# Patient Record
Sex: MALE | Race: WHITE | Employment: FULL TIME | URBAN - NONMETROPOLITAN AREA
[De-identification: names, ages, dates, MRNs, and addresses within clinical notes are randomized per-mention and may not be internally consistent; named-entity substitution may affect disease eponyms.]

---

## 2019-08-29 ENCOUNTER — OFFICE VISIT (OUTPATIENT)
Dept: CARDIOLOGY CLINIC | Age: 54
End: 2019-08-29
Payer: COMMERCIAL

## 2019-08-29 VITALS
HEIGHT: 70 IN | SYSTOLIC BLOOD PRESSURE: 134 MMHG | BODY MASS INDEX: 30.12 KG/M2 | HEART RATE: 57 BPM | DIASTOLIC BLOOD PRESSURE: 93 MMHG | WEIGHT: 210.4 LBS

## 2019-08-29 DIAGNOSIS — Z95.820 S/P ANGIOPLASTY WITH STENT: ICD-10-CM

## 2019-08-29 DIAGNOSIS — I25.10 CORONARY ARTERY DISEASE INVOLVING NATIVE CORONARY ARTERY OF NATIVE HEART WITHOUT ANGINA PECTORIS: ICD-10-CM

## 2019-08-29 DIAGNOSIS — I10 ESSENTIAL HYPERTENSION: Primary | ICD-10-CM

## 2019-08-29 DIAGNOSIS — E78.00 PURE HYPERCHOLESTEROLEMIA: ICD-10-CM

## 2019-08-29 PROBLEM — K21.9 GERD (GASTROESOPHAGEAL REFLUX DISEASE): Status: ACTIVE | Noted: 2019-08-29

## 2019-08-29 PROCEDURE — 93000 ELECTROCARDIOGRAM COMPLETE: CPT | Performed by: INTERNAL MEDICINE

## 2019-08-29 PROCEDURE — 99204 OFFICE O/P NEW MOD 45 MIN: CPT | Performed by: INTERNAL MEDICINE

## 2019-08-29 RX ORDER — LISINOPRIL 20 MG/1
20 TABLET ORAL DAILY
Qty: 30 TABLET | Refills: 5 | Status: SHIPPED | OUTPATIENT
Start: 2019-08-29 | End: 2020-03-10 | Stop reason: SDUPTHER

## 2019-08-29 RX ORDER — ATORVASTATIN CALCIUM 20 MG/1
40 TABLET, FILM COATED ORAL
Qty: 30 TABLET | Refills: 5 | Status: SHIPPED | OUTPATIENT
Start: 2019-08-29 | End: 2019-08-30 | Stop reason: DRUGHIGH

## 2019-08-29 RX ORDER — ATORVASTATIN CALCIUM 20 MG/1
40 TABLET, FILM COATED ORAL
COMMUNITY
Start: 2016-04-18 | End: 2019-08-29 | Stop reason: SDUPTHER

## 2019-08-29 RX ORDER — NEBIVOLOL 2.5 MG/1
TABLET ORAL DAILY
COMMUNITY
End: 2019-08-29 | Stop reason: SDUPTHER

## 2019-08-29 RX ORDER — NEBIVOLOL 2.5 MG/1
2.5 TABLET ORAL DAILY
Qty: 30 TABLET | Refills: 5 | Status: SHIPPED | OUTPATIENT
Start: 2019-08-29 | End: 2020-03-10 | Stop reason: SDUPTHER

## 2019-08-29 NOTE — PROGRESS NOTES
current treatment and with constant vigilance to changes in symptoms and also any potential side effects. Return for care or seek medical attention immediately if symptoms got worse and/or develop new symptoms. Hypertension, on medical treatment. Seems to be under good control. Patient is compliant with medical treatment. Hyperlipidemia: on statins, followed periodically. Patient need periodic lipid and liver profile. Coronary artery disease, seems to be stable. Denies angina or change in breathing pattern    Lipid panel and liver function test before next appointment  Echo  CBC/BMP.  MG    Get report from  Edson    Stable and doing well    D/w the pat the plan of care    RTC in 4 months    Celine Albion [facility]

## 2019-08-30 RX ORDER — ATORVASTATIN CALCIUM 20 MG/1
20 TABLET, FILM COATED ORAL DAILY
COMMUNITY
End: 2019-08-30 | Stop reason: SDUPTHER

## 2019-08-30 RX ORDER — ATORVASTATIN CALCIUM 20 MG/1
20 TABLET, FILM COATED ORAL DAILY
Qty: 30 TABLET | Refills: 6 | Status: SHIPPED | OUTPATIENT
Start: 2019-08-30 | End: 2020-03-10 | Stop reason: SDUPTHER

## 2019-09-05 ENCOUNTER — HOSPITAL ENCOUNTER (OUTPATIENT)
Dept: NON INVASIVE DIAGNOSTICS | Age: 54
Discharge: HOME | End: 2019-09-05
Payer: COMMERCIAL

## 2019-09-05 DIAGNOSIS — E78.00 PURE HYPERCHOLESTEROLEMIA: ICD-10-CM

## 2019-09-05 DIAGNOSIS — I25.10 CORONARY ARTERY DISEASE INVOLVING NATIVE CORONARY ARTERY OF NATIVE HEART WITHOUT ANGINA PECTORIS: ICD-10-CM

## 2019-09-05 DIAGNOSIS — I10 ESSENTIAL HYPERTENSION: ICD-10-CM

## 2019-09-05 DIAGNOSIS — Z95.820 S/P ANGIOPLASTY WITH STENT: ICD-10-CM

## 2019-09-05 LAB
LV EF: 60 %
LVEF MODALITY: NORMAL

## 2019-09-05 PROCEDURE — 93306 TTE W/DOPPLER COMPLETE: CPT

## 2019-09-25 ENCOUNTER — TELEPHONE (OUTPATIENT)
Dept: FAMILY MEDICINE CLINIC | Age: 54
End: 2019-09-25

## 2019-10-02 ENCOUNTER — TELEPHONE (OUTPATIENT)
Dept: FAMILY MEDICINE CLINIC | Age: 54
End: 2019-10-02

## 2020-01-15 ENCOUNTER — HOSPITAL ENCOUNTER (OUTPATIENT)
Age: 55
Discharge: HOME | End: 2020-01-15
Payer: COMMERCIAL

## 2020-01-15 LAB
ALBUMIN SERPL-MCNC: 4.5 G/DL (ref 3.5–5.1)
ALP BLD-CCNC: 63 U/L (ref 38–126)
ALT SERPL-CCNC: 27 U/L (ref 11–66)
ANION GAP SERPL CALCULATED.3IONS-SCNC: 13 MEQ/L (ref 8–16)
AST SERPL-CCNC: 26 U/L (ref 5–40)
BASOPHILS # BLD: 0.7 %
BASOPHILS ABSOLUTE: 0 THOU/MM3 (ref 0–0.1)
BILIRUB SERPL-MCNC: 0.7 MG/DL (ref 0.3–1.2)
BILIRUBIN DIRECT: < 0.2 MG/DL (ref 0–0.3)
BUN BLDV-MCNC: 21 MG/DL (ref 7–22)
CALCIUM SERPL-MCNC: 9.3 MG/DL (ref 8.5–10.5)
CHLORIDE BLD-SCNC: 99 MEQ/L (ref 98–111)
CHOLESTEROL, TOTAL: 148 MG/DL (ref 100–199)
CO2: 24 MEQ/L (ref 23–33)
CREAT SERPL-MCNC: 0.9 MG/DL (ref 0.4–1.2)
EOSINOPHIL # BLD: 4.5 %
EOSINOPHILS ABSOLUTE: 0.3 THOU/MM3 (ref 0–0.4)
ERYTHROCYTE [DISTWIDTH] IN BLOOD BY AUTOMATED COUNT: 12.5 % (ref 11.5–14.5)
ERYTHROCYTE [DISTWIDTH] IN BLOOD BY AUTOMATED COUNT: 43.2 FL (ref 35–45)
GFR SERPL CREATININE-BSD FRML MDRD: 88 ML/MIN/1.73M2
GLUCOSE BLD-MCNC: 102 MG/DL (ref 70–108)
HCT VFR BLD CALC: 47.8 % (ref 42–52)
HDLC SERPL-MCNC: 57 MG/DL
HEMOGLOBIN: 16 GM/DL (ref 14–18)
IMMATURE GRANS (ABS): 0.03 THOU/MM3 (ref 0–0.07)
IMMATURE GRANULOCYTES: 0.4 %
LDL CHOLESTEROL CALCULATED: 79 MG/DL
LYMPHOCYTES # BLD: 37.3 %
LYMPHOCYTES ABSOLUTE: 2.5 THOU/MM3 (ref 1–4.8)
MAGNESIUM: 2.2 MG/DL (ref 1.6–2.4)
MCH RBC QN AUTO: 31.6 PG (ref 26–33)
MCHC RBC AUTO-ENTMCNC: 33.5 GM/DL (ref 32.2–35.5)
MCV RBC AUTO: 94.3 FL (ref 80–94)
MONOCYTES # BLD: 11.7 %
MONOCYTES ABSOLUTE: 0.8 THOU/MM3 (ref 0.4–1.3)
NUCLEATED RED BLOOD CELLS: 0 /100 WBC
PLATELET # BLD: 219 THOU/MM3 (ref 130–400)
PMV BLD AUTO: 9.3 FL (ref 9.4–12.4)
POTASSIUM SERPL-SCNC: 4.6 MEQ/L (ref 3.5–5.2)
RBC # BLD: 5.07 MILL/MM3 (ref 4.7–6.1)
SEG NEUTROPHILS: 45.4 %
SEGMENTED NEUTROPHILS ABSOLUTE COUNT: 3 THOU/MM3 (ref 1.8–7.7)
SODIUM BLD-SCNC: 136 MEQ/L (ref 135–145)
TOTAL PROTEIN: 7.2 G/DL (ref 6.1–8)
TRIGL SERPL-MCNC: 60 MG/DL (ref 0–199)
WBC # BLD: 6.7 THOU/MM3 (ref 4.8–10.8)

## 2020-01-15 PROCEDURE — 85025 COMPLETE CBC W/AUTO DIFF WBC: CPT

## 2020-01-15 PROCEDURE — 80061 LIPID PANEL: CPT

## 2020-01-15 PROCEDURE — 82248 BILIRUBIN DIRECT: CPT

## 2020-01-15 PROCEDURE — 36415 COLL VENOUS BLD VENIPUNCTURE: CPT

## 2020-01-15 PROCEDURE — 83735 ASSAY OF MAGNESIUM: CPT

## 2020-01-15 PROCEDURE — 80053 COMPREHEN METABOLIC PANEL: CPT

## 2020-01-17 ENCOUNTER — OFFICE VISIT (OUTPATIENT)
Dept: CARDIOLOGY CLINIC | Age: 55
End: 2020-01-17
Payer: COMMERCIAL

## 2020-01-17 VITALS
HEART RATE: 68 BPM | HEIGHT: 70 IN | WEIGHT: 216 LBS | BODY MASS INDEX: 30.92 KG/M2 | SYSTOLIC BLOOD PRESSURE: 108 MMHG | DIASTOLIC BLOOD PRESSURE: 69 MMHG

## 2020-01-17 PROBLEM — Z95.820 S/P ANGIOPLASTY WITH STENT: Status: RESOLVED | Noted: 2019-08-29 | Resolved: 2020-01-17

## 2020-01-17 PROCEDURE — 99214 OFFICE O/P EST MOD 30 MIN: CPT | Performed by: INTERNAL MEDICINE

## 2020-01-17 RX ORDER — ASPIRIN 81 MG/1
81 TABLET ORAL DAILY
Qty: 90 TABLET | Refills: 1 | COMMUNITY
Start: 2020-01-17

## 2020-01-17 NOTE — PROGRESS NOTES
Chief Complaint   Patient presents with    Hypertension    Follow-up   originally  patient moved from [facility]  ? ? ? Hx of Mitral valve repair/ clip for MR 5 yrs back- transcathater- per pat ( not confirmed)  ? ? ? Hx of coronary stent 2 5 yrs back per pat ( Not confirmed from record)      Today here 5 months F/u    Denied cp, sob, palpitations, dizziness or edema    Nonsmoker    FHX  Mother had valve procedure, had stent at 72    Patient Active Problem List   Diagnosis    Coronary artery disease involving native coronary artery of native heart without angina pectoris    Pure hypercholesterolemia    Essential hypertension    Abnormal stress test    Chronic systolic heart failure ([address]    Diastolic murmur    Family history of early CAD    GERD (gastroesophageal reflux disease)    Inguinal hernia    Insomnia    LVH (left ventricular hypertrophy)    Smoking       Past Surgical History:   Procedure Laterality Date    APPENDECTOMY      CARDIAC CATHETERIZATION      CORONARY ANGIOPLASTY      INGUINAL HERNIA REPAIR      INGUINAL HERNIA REPAIR Left 05/13/2016    MITRAL VALVE REPAIR      ROTATOR CUFF REPAIR Right     VENTRAL HERNIA REPAIR  05/13/2016       No Known Allergies     History reviewed. No pertinent family history.      Social History     Socioeconomic History    Marital status:      Spouse name: Not on file    Number of children: Not on file    Years of education: Not on file    Highest education level: Not on file   Occupational History    Not on file   Social Needs    Financial resource strain: Not on file    Food insecurity:     Worry: Not on file     Inability: Not on file    Transportation needs:     Medical: Not on file     Non-medical: Not on file   Tobacco Use    Smoking status: Former Smoker    Smokeless tobacco: Current User     Types: Chew   Substance and Sexual Activity    Alcohol use: Not on file    Drug use: Not on file    Sexual activity: Not on file   Lifestyle bruits  Chest - clear to auscultation, no wheezes, rales or rhonchi, symmetric air entry  Heart - normal rate, regular rhythm, normal S1, S2, no murmurs, rubs, clicks or gallops  Abdomen - soft, nontender, nondistended, no masses or organomegaly  DAVI- no CVA or flank tenderness, no suprapubic tenderness  Neurological - alert, oriented, normal speech, no focal findings or movement disorder noted  Musculoskeletal/limbs - no joint tenderness, deformity or swelling   - peripheral pulses normal, no pedal edema, no clubbing or cyanosis  Skin - normal coloration and turgor, no rashes, no suspicious skin lesions noted  Psych- appropriate mood and affect    Lab  No results for input(s): CKTOTAL, CKMB, CKMBINDEX, TROPONINI in the last 72 hours.   CBC:   Lab Results   Component Value Date    WBC 6.7 01/15/2020    RBC 5.07 01/15/2020    HGB 16.0 01/15/2020    HCT 47.8 01/15/2020    MCV 94.3 01/15/2020    MCH 31.6 01/15/2020    MCHC 33.5 01/15/2020     01/15/2020    MPV 9.3 01/15/2020     BMP:    Lab Results   Component Value Date     01/15/2020    K 4.6 01/15/2020    CL 99 01/15/2020    CO2 24 01/15/2020    BUN 21 01/15/2020    LABALBU 4.5 01/15/2020    CREATININE 0.9 01/15/2020    CALCIUM 9.3 01/15/2020    LABGLOM 88 01/15/2020    GLUCOSE 102 01/15/2020     Hepatic Function Panel:    Lab Results   Component Value Date    ALKPHOS 63 01/15/2020    ALT 27 01/15/2020    AST 26 01/15/2020    PROT 7.2 01/15/2020    BILITOT 0.7 01/15/2020    BILIDIR <0.2 01/15/2020    LABALBU 4.5 01/15/2020     Magnesium:    Lab Results   Component Value Date    MG 2.2 01/15/2020     Warfarin PT/INR:  No components found for: PTPATWAR, PTINRWAR  HgBA1c:  No results found for: LABA1C  FLP:    Lab Results   Component Value Date    TRIG 60 01/15/2020    HDL 57 01/15/2020    LDLCALC 79 01/15/2020     TSH:  No results found for: TSH  Reviewed the record from outside Hospital  Cardiac cath Dec 30, 2015 at [facility], [address] [address] [address] < [place]  ]EF 65%  LAD : 30-50%  LCx/RCA: Normal  RCA could not be accessed radially- had to be accessed through femoral access    Electronically signed by Parth Victor MD at 12/30/2015 11:37 AM EST        Mid LAD lesion 50% stenosed.       Final Impression:        1.  Coronary artery disease as described above    2.  Mild to moderate stenosis of the mid left anterior descending artery.    3.  The left circumflex Artery and the right coronary artery appear     angiographically normal.  The right coronary artery has a posterior     takeoff.    4.  Left ventricular filling pressures are normal    5.  Left ventricular systolic function is normal           Plan:        1.  Adding calcium channel blockers for possible spasm.    2.  Aspirin and statin therapy is imperative.      Mike Villaseñor MD, [facility]    Echo Dec 2015  CONCLUSIONS     Left ventricular wall thickness mildly increased. Left ventricular cavity size normal.  Mild global hypokinesis. LVEF   45-50%.     Normal right ventricular size.   Mildly reduced right ventricular global systolic function.     Mild-to-moderate mitral regurgitation    ekg  8/29/19  Sinus  Bradycardia   WITHIN NORMAL LIMITS    Conclusions      Summary   Normal left ventricle size and systolic function. Ejection fraction was   estimated at 60 %. There were no regional left ventricular wall motion   abnormalities and wall thickness was within normal limits. The left atrium is Mildly dilated. Signature      ----------------------------------------------------------------   Electronically signed by Barbara Cabrera MD (Interpreting   physician) on 09/05/2019 at 05:39 PM      Assessment    Hx valve procedure mitral valve     Diagnosis Orders   1. Coronary artery disease involving native coronary artery of native heart without angina pectoris     2. Pure hypercholesterolemia     3.  Essential hypertension           Plan   The meds and labs reviewed    Continue the current treatment and with constant vigilance to changes in symptoms and also any potential side effects. Return for care or seek medical attention immediately if symptoms got worse and/or develop new symptoms. Hypertension, on medical treatment. Seems to be under good control. Patient is compliant with medical treatment. Hyperlipidemia: on statins, followed periodically. Patient need periodic lipid and liver profile. Coronary artery disease, seems to be stable.  Denies angina or change in breathing pattern  Asa 81 mg po qd  Metoprolol stopped In [place] due to ED per pat  Now on Bystolic    Echo- WNL   report from  [address] [place] [address] reviewed   NO stent noted   No Mitral valve surgery Noted from records    Stable and doing well    D/w the pat the plan of care    RTC in 6 months    Garrett Clark [facility]

## 2020-03-10 RX ORDER — ATORVASTATIN CALCIUM 20 MG/1
20 TABLET, FILM COATED ORAL DAILY
Qty: 90 TABLET | Refills: 1 | Status: SHIPPED | OUTPATIENT
Start: 2020-03-10 | End: 2020-07-17 | Stop reason: SDUPTHER

## 2020-03-10 RX ORDER — NEBIVOLOL 2.5 MG/1
2.5 TABLET ORAL DAILY
Qty: 90 TABLET | Refills: 1 | Status: SHIPPED | OUTPATIENT
Start: 2020-03-10 | End: 2020-07-17 | Stop reason: SDUPTHER

## 2020-03-10 RX ORDER — LISINOPRIL 20 MG/1
20 TABLET ORAL DAILY
Qty: 90 TABLET | Refills: 1 | Status: SHIPPED | OUTPATIENT
Start: 2020-03-10 | End: 2020-07-17 | Stop reason: SDUPTHER

## 2020-07-08 ENCOUNTER — TELEPHONE (OUTPATIENT)
Dept: FAMILY MEDICINE CLINIC | Age: 55
End: 2020-07-08

## 2020-07-08 NOTE — TELEPHONE ENCOUNTER
1. Appt time and date. (give directions)   Future Appointments   Date Time Provider Moiz Hernández   7/9/2020 10:00 AM [address]   7/17/2020  8:30 AM MD [facility]     2. Arrive 5 min before appt. 3. Please bring all medications to appt. 4. Bring immunization record.   (if no record, which immunizations have you had and where?)    appt confirmed

## 2020-07-08 NOTE — PROGRESS NOTES
Chief Complaint   Patient presents with    New Patient     np, est pcp    Coronary Artery Disease     HFpEF    Hypertension    Hyperlipidemia    Otalgia    Elbow Pain     R    Nicotine Dependence       History obtained from the patient. SUBJECTIVE:  Adam Parisi is a 48 y.o. male that presents today for establishing care with new physician, etc. New patient, [facility] [address]. -CAD/HFpEF:    HPI:  S/p stent placement and mitral valve repair  See's cardio    History of myocardial infarction? Yes    History of heart failure? Yes. Current symptoms of angina? No   Patient compliant with therapy? Yes  Tobacco use? Chew      Antiplatelet therapy? Yes    Beta-blocker therapy? Yes   ACE/ARB therapy - Yes      -HTN:    HPI:    Taking meds as prescribed ?: yes  Tolerating well ?: yes  Side Effects ?: denies  BP at home ?: <140/90  Working on [employer] ?: yes  Chest Pain/SOB/Palpitations? denies    BP Readings from Last 3 Encounters:   20 122/86   20 108/69   19 (!) 134/93       -HLD:    HPI:    Taking meds as prescribed ?: yes  Tolerating well ?: yes  Side Effects ?: denies  Muscle Pain?: denies  Working on [address] [employer] ?: yes      -B Ear Complaint:    HPI:  Going on a month or 6  wks  Treated with ATB last time around, did help  Mild runny nose  No fevers    Inciting incident or history of trauma? no  Decreased hearing? Yes - mild  Ear tenderness? No  Ear drainage? No  Feeling of fullness? Yes  Radiation of the pain? No  Dizziness or pre-syncope? No  Affected by position or head movment? No  Sore throat, rhinorrhea or sinus congestion?  mild  Hx of Bruxism?  No  Treatment tried and response - as above      -R elbow pain:  Pain lateral R elbow  Does competitive shooting  Worse with this  Going on a few months  Asking what can do      -Chewing tobacco:  Chewed for a long time  Would like to quit  Tried chantix in the past, worked well, but stopped when mom   Wants to resume this again, as tolerated well. Age/Gender Health Maintenance    Lipid -   Lab Results   Component Value Date    CHOL 148 01/15/2020     Lab Results   Component Value Date    TRIG 60 01/15/2020     Lab Results   Component Value Date    HDL 57 01/15/2020     Lab Results   Component Value Date    LDLCALC 79 01/15/2020     No results found for: LABVLDL, VLDL  No results found for: CHOLHDLRATIO      DM Screen -   Lab Results   Component Value Date    GLUCOSE 102 01/15/2020     No results found for: LABA1C    Colon Cancer Screening - records pending  Lung Cancer Screening (Age 54 to [de-identified] with 27 pack year hx, current smoker or quit within past 13 years) - age 54 if meets criteria    Tetanus - declines July 2020  Influenza Vaccine - Candidate FALL 2020  Pneumonia Vaccine - declines July 2020  Zoster - declines July 2020     PSA testing discussion - age 54  AAA Screening - age 72 if smoked    Falls screening - n/a      Current Outpatient Medications   Medication Sig Dispense Refill    amLODIPine (NORVASC) 10 MG tablet Take 1 tablet by mouth daily      varenicline (CHANTIX CONTINUING MONTH MANISHA) 1 MG tablet Take 1 tablet by mouth 2 times daily 60 tablet 1    varenicline (CHANTIX STARTING MONTH MANISHA) 0.5 MG X 11 & 1 MG X 42 tablet Take by mouth as directed on box 1 box 0    fluticasone (FLONASE) 50 MCG/ACT nasal spray 1 spray by Nasal route daily 2 Bottle 0    lisinopril (PRINIVIL;ZESTRIL) 20 MG tablet Take 1 tablet by mouth daily 90 tablet 1    nebivolol (BYSTOLIC) 2.5 MG tablet Take 1 tablet by mouth daily 90 tablet 1    atorvastatin (LIPITOR) 20 MG tablet Take 1 tablet by mouth daily 90 tablet 1    aspirin EC 81 MG EC tablet Take 1 tablet by mouth daily 90 tablet 1     No current facility-administered medications for this visit.       Orders Placed This Encounter   Medications    varenicline (CHANTIX CONTINUING MONTH MANISHA) 1 MG tablet     Sig: Take 1 tablet by mouth 2 times daily     Dispense:  60 tablet Refill:  1    varenicline (CHANTIX STARTING MONTH ) 0.5 MG X 11 & 1 MG X 42 tablet     Sig: Take by mouth as directed on box     Dispense:  1 box     Refill:  0    fluticasone (FLONASE) 50 MCG/ACT nasal spray     Si spray by Nasal route daily     Dispense:  2 Bottle     Refill:  0         All medications reviewed and reconciled, including OTC and herbal medications. Updated list given to patient. Patient Active Problem List    Diagnosis Date Noted    Dysfunction of both eustachian tubes 2020    Right lateral epicondylitis 2020    ASHD (arteriosclerotic heart disease)     Dyslipidemia     Former smoker     Heart failure with preserved ejection fraction (HCC)      Hx of prior LV dysfunction      Hypertension, essential     Mitral regurgitation     Nicotine dependence, chewing tobacco, uncomplicated     Obesity (BMI 30-39. 9)     S/P coronary artery stent placement     S/P mitral valve repair     Diastolic murmur     Family history of early CAD 2015    LVH (left ventricular hypertrophy) 2015       Past Medical History:   Diagnosis Date    ASHD (arteriosclerotic heart disease)    [place] Dyslipidemia     Former smoker     GERD (gastroesophageal reflux disease)     Heart failure with preserved ejection fraction (HCC)     Hx of prior LV dysfunction    Hypertension, essential     Mitral regurgitation     Nicotine dependence, chewing tobacco, uncomplicated     Obesity (BMI 30-39. 9)     S/P coronary artery stent placement     S/P mitral valve repair          Past Surgical History:   Procedure Laterality Date    APPENDECTOMY      CARDIAC CATHETERIZATION      CORONARY ANGIOPLASTY      INGUINAL HERNIA REPAIR      INGUINAL HERNIA REPAIR Left 2016    MITRAL VALVE REPAIR      ROTATOR CUFF REPAIR Right     right and left    VENTRAL HERNIA REPAIR  2016         No Known Allergies      Social History     Tobacco Use    Smoking status: Former Smoker Start date: 2015     Last attempt to quit: 2016     Years since quittin.0    Smokeless tobacco: Current User     Types: Chew   Substance Use Topics    Alcohol use: Not Currently         Family History   Problem Relation Age of Onset    No Known Problems Mother     No Known Problems Father     Colon Cancer Neg Hx     Prostate Cancer Neg Hx          I have reviewed the patient's past medical history, past surgical history, allergies, medications, social and family history and I have made updates where appropriate. Review of Systems  Positive responses are highlighted in bold    Constitutional:  Fever, Chills, Night Sweats, Fatigue, Unexpected changes in weight  Eyes:  Eye discharge, Eye pain, Eye redness, Visual disturbances   HENT:  Ear pain, Tinnitus, Nosebleeds, Trouble swallowing, Hearing loss, Sore throat  Cardiovascular:  Chest Pain, Palpitations, Orthopnea, Paroxysmal Nocturnal Dyspnea  Respiratory:  Cough, Wheezing, Shortness of breath, Chest tightness, Apnea  Gastrointestinal:  Nausea, Vomiting, Diarrhea, Constipation, Heartburn, Blood in stool  Genitourinary:  Difficulty or painful urination, Flank pain, Change in frequency, Urgency  Skin:  Color change, Rash, Itching, Wound  Psychiatric:  Hallucinations, Anxiety, Depression, Suicidal ideation  Hematological:  Enlarged glands, Easy bleeding, Easily bruising  Musculoskeletal:  Joint pain, Back pain, Gait problems, Joint swelling, Myalgias  Neurological:  Dizziness, Headaches, Presyncope, Numbness, Seizures, Tremors  Allergy:  Environmental allergies, Food allergies  Endocrine:  Heat Intolerance, Cold Intolerance, Polydipsia, Polyphagia, Polyuria      PHYSICAL EXAM:  Vitals:    20 1006   BP: 122/86   Pulse: 63   Resp: 10   Temp: 98.2 °F (36.8 °C)   TempSrc: Oral   SpO2: 97%   Weight: 215 lb (97.5 kg)   Height: 5' 8.31\" (1.735 m)     Body mass index is 32.4 kg/m².   Pain Score:   2(R elbow, both ears)    VS Reviewed  General on: See Attached    I have instructed Brandie Greenberg to complete a self tracking handout on Blood Pressures  and Chew tob and instructed them to bring it with them to his next appointment. Barriers to learning and self management: none    Discussed use, benefit, and side effects of prescribed medications. Barriers to medication compliance addressed. All patient questions answered. Pt voiced understanding.        Electronically signed by Sunita Spears DO on 7/9/2020 at 10:43 AM

## 2020-07-09 ENCOUNTER — TELEPHONE (OUTPATIENT)
Dept: FAMILY MEDICINE CLINIC | Age: 55
End: 2020-07-09

## 2020-07-09 ENCOUNTER — OFFICE VISIT (OUTPATIENT)
Dept: FAMILY MEDICINE CLINIC | Age: 55
End: 2020-07-09
Payer: COMMERCIAL

## 2020-07-09 VITALS
BODY MASS INDEX: 32.58 KG/M2 | OXYGEN SATURATION: 97 % | SYSTOLIC BLOOD PRESSURE: 122 MMHG | RESPIRATION RATE: 10 BRPM | TEMPERATURE: 98.2 F | WEIGHT: 215 LBS | HEART RATE: 63 BPM | HEIGHT: 68 IN | DIASTOLIC BLOOD PRESSURE: 86 MMHG

## 2020-07-09 PROBLEM — M77.11 RIGHT LATERAL EPICONDYLITIS: Status: ACTIVE | Noted: 2020-07-09

## 2020-07-09 PROBLEM — K21.9 GERD (GASTROESOPHAGEAL REFLUX DISEASE): Status: RESOLVED | Noted: 2019-08-29 | Resolved: 2020-07-09

## 2020-07-09 PROBLEM — H69.83 DYSFUNCTION OF BOTH EUSTACHIAN TUBES: Status: ACTIVE | Noted: 2020-07-09

## 2020-07-09 PROBLEM — I10 ESSENTIAL HYPERTENSION: Status: RESOLVED | Noted: 2019-08-29 | Resolved: 2020-07-09

## 2020-07-09 PROBLEM — H69.93 DYSFUNCTION OF BOTH EUSTACHIAN TUBES: Status: ACTIVE | Noted: 2020-07-09

## 2020-07-09 PROBLEM — E78.00 PURE HYPERCHOLESTEROLEMIA: Status: RESOLVED | Noted: 2019-08-29 | Resolved: 2020-07-09

## 2020-07-09 PROBLEM — I25.10 CORONARY ARTERY DISEASE INVOLVING NATIVE CORONARY ARTERY OF NATIVE HEART WITHOUT ANGINA PECTORIS: Status: RESOLVED | Noted: 2019-08-29 | Resolved: 2020-07-09

## 2020-07-09 PROCEDURE — 99204 OFFICE O/P NEW MOD 45 MIN: CPT | Performed by: FAMILY MEDICINE

## 2020-07-09 RX ORDER — VARENICLINE TARTRATE 1 MG/1
1 TABLET, FILM COATED ORAL 2 TIMES DAILY
Qty: 60 TABLET | Refills: 1 | Status: SHIPPED | OUTPATIENT
Start: 2020-07-09 | End: 2020-10-29

## 2020-07-09 RX ORDER — AMLODIPINE BESYLATE 10 MG/1
1 TABLET ORAL DAILY
COMMUNITY
Start: 2020-03-31 | End: 2022-07-28 | Stop reason: SDUPTHER

## 2020-07-09 RX ORDER — VARENICLINE TARTRATE
KIT
Qty: 1 BOX | Refills: 0 | Status: SHIPPED | OUTPATIENT
Start: 2020-07-09 | End: 2020-10-29

## 2020-07-09 RX ORDER — FLUTICASONE PROPIONATE 50 MCG
1 SPRAY, SUSPENSION (ML) NASAL DAILY
Qty: 2 BOTTLE | Refills: 0 | Status: SHIPPED | OUTPATIENT
Start: 2020-07-09 | End: 2020-08-20

## 2020-07-09 ASSESSMENT — PATIENT HEALTH QUESTIONNAIRE - PHQ9
SUM OF ALL RESPONSES TO PHQ QUESTIONS 1-9: 0
2. FEELING DOWN, DEPRESSED OR HOPELESS: 0
1. LITTLE INTEREST OR PLEASURE IN DOING THINGS: 0
SUM OF ALL RESPONSES TO PHQ9 QUESTIONS 1 & 2: 0
SUM OF ALL RESPONSES TO PHQ QUESTIONS 1-9: 0

## 2020-07-09 NOTE — PATIENT INSTRUCTIONS
Patient Education        Eustachian Tube Problems: Care Instructions  Your Care Instructions     The eustachian (say \"you-STAY-shee-un\") tubes run between the inside of the ears and the throat. They keep air pressure stable in the ears. If your eustachian tubes become blocked, the air pressure in your ears changes. The fluids from a cold can clog eustachian tubes, causing pain in the ears. A quick change in air pressure can cause eustachian tubes to close up. This might happen when an airplane changes altitude or when a  goes up or down underwater. Eustachian tube problems often clear up on their own or after antibiotic treatment. If your tubes continue to be blocked, you may need surgery. Follow-up care is a key part of your treatment and safety. Be sure to make and go to all appointments, and call your doctor if you are having problems. It's also a good idea to know your test results and keep a list of the medicines you take. How can you care for yourself at home? · To ease ear pain, apply a warm washcloth or a heating pad set on low. There may be some drainage from the ear when the heat melts earwax. Put a cloth between the heat source and your skin. Do not use a heating pad with children. · If your doctor prescribed antibiotics, take them as directed. Do not stop taking them just because you feel better. You need to take the full course of antibiotics. · Your doctor may recommend over-the-counter medicine. Be safe with medicines. Oral or nasal decongestants may relieve ear pain. Avoid decongestants that are combined with antihistamines, which tend to cause more blockage. But if allergies seem to be the problem, your doctor may recommend a combination. Be careful with cough and cold medicines. Don't give them to children younger than 6, because they don't work for children that age and can even be harmful. For children 6 and older, always follow all the instructions carefully.  Make sure you know how much medicine to give and how long to use it. And use the dosing device if one is included. When should you call for help? Call your doctor now or seek immediate medical care if:  · You develop sudden, complete hearing loss. · You have severe pain or feel dizzy. · You have new or increasing pus or blood draining from your ear. · You have redness, swelling, or pain around or behind the ear. Watch closely for changes in your health, and be sure to contact your doctor if:  · You do not get better after 2 weeks. · You have any new symptoms, such as itching or a feeling of fullness in the ear. Where can you learn more? Go to https://[employer]rec.comeb.[employer]. org and sign in to your [employer] account. Enter Y822 in the [employer] box to learn more about \"Eustachian Tube Problems: Care Instructions. \"     If you do not have an account, please click on the \"Sign Up Now\" link. Current as of: July 29, 2019               Content Version: 12.5  © 4247-2387 [employer]. Care instructions adapted under license by [facility] ([facility]). If you have questions about a medical condition or this instruction, always ask your healthcare professional. [place] [address] any warranty or liability for your use of this information. Patient Education        Stopping Smokeless Tobacco Use: Care Instructions  Your Care Instructions     Smokeless tobacco comes in many forms, such as snuff and chewing tobacco:  · Snuff is finely ground tobacco sold in cans or pouches. Most of the time, snuff is used by putting a \"pinch\" or \"dip\" between the lower lip or cheek and the gum. · Chewing tobacco is sold as loose leaves, plugs, or twists. It is chewed or placed between the cheek and the gum or teeth. There are plenty of reasons to stop using smokeless tobacco. These products are harmful. They are not risk-free alternatives to smoking.  Smokeless tobacco contains nicotine, which is addicting. Though using smokeless tobacco is less harmful than smoking cigarettes, it can cause serious health problems, such as:  · White patches or red sores in your mouth that can turn into mouth cancer involving the lip, tongue, or cheek. · Tooth loss and other dental problems. · Gum disease. Your gums may pull away from your teeth and not grow back. People who use smokeless tobacco crave the nicotine in it. Giving up smokeless tobacco is much harder than simply changing a habit. Your body has to stop craving the nicotine. It is hard to quit, but you can do it. Many tools are available for people who want to quit using smokeless tobacco. You may find that combining tools works best for you. There are several steps to quitting. First you get ready to quit. Then you get support to help you. After that, you learn new skills and behaviors to quit. For many people, a necessary step is getting and using medicine. Your doctor will help you set up the plan that best meets your needs. You may want to attend a tobacco cessation program. When you choose a program, look for one that has proven success. Ask your doctor for ideas. You will greatly increase your chances of success if you take medicine as well as get counseling or join a cessation program.  Some of the changes you feel when you first quit smokeless tobacco are uncomfortable. Your body will miss the nicotine at first, and you may feel short-tempered and grumpy. You may have trouble sleeping or concentrating. Medicine can help you deal with these symptoms. You may struggle with changing your habits and rituals. The last step is the tricky one: Be prepared for the urge to use smokeless tobacco to continue for a time. This is a lot to deal with, but keep at it. You will feel better. Follow-up care is a key part of your treatment and safety. Be sure to make and go to all appointments, and call your doctor if you are having problems.  It's also a good idea to

## 2020-07-09 NOTE — TELEPHONE ENCOUNTER
I spoke with Alvino Cesar @ [facility] office to request colo report from [email or web address] to be faxed to review

## 2020-07-17 ENCOUNTER — OFFICE VISIT (OUTPATIENT)
Dept: CARDIOLOGY CLINIC | Age: 55
End: 2020-07-17
Payer: COMMERCIAL

## 2020-07-17 VITALS
BODY MASS INDEX: 31.34 KG/M2 | HEART RATE: 55 BPM | DIASTOLIC BLOOD PRESSURE: 89 MMHG | WEIGHT: 211.6 LBS | SYSTOLIC BLOOD PRESSURE: 136 MMHG | HEIGHT: 69 IN

## 2020-07-17 PROCEDURE — 99214 OFFICE O/P EST MOD 30 MIN: CPT | Performed by: INTERNAL MEDICINE

## 2020-07-17 RX ORDER — NEBIVOLOL 2.5 MG/1
2.5 TABLET ORAL DAILY
Qty: 90 TABLET | Refills: 3 | Status: SHIPPED | OUTPATIENT
Start: 2020-07-17 | End: 2021-08-11

## 2020-07-17 RX ORDER — LISINOPRIL 20 MG/1
20 TABLET ORAL DAILY
Qty: 90 TABLET | Refills: 3 | Status: SHIPPED | OUTPATIENT
Start: 2020-07-17 | End: 2021-08-11

## 2020-07-17 RX ORDER — ATORVASTATIN CALCIUM 20 MG/1
20 TABLET, FILM COATED ORAL DAILY
Qty: 90 TABLET | Refills: 3 | Status: SHIPPED | OUTPATIENT
Start: 2020-07-17 | End: 2021-08-11

## 2020-07-26 ENCOUNTER — HOSPITAL ENCOUNTER (EMERGENCY)
Age: 55
Discharge: HOME | End: 2020-07-26
Payer: COMMERCIAL

## 2020-07-26 VITALS
HEART RATE: 63 BPM | WEIGHT: 213.25 LBS | HEIGHT: 68 IN | BODY MASS INDEX: 32.32 KG/M2 | SYSTOLIC BLOOD PRESSURE: 137 MMHG | TEMPERATURE: 98.3 F | DIASTOLIC BLOOD PRESSURE: 93 MMHG | OXYGEN SATURATION: 94 % | RESPIRATION RATE: 16 BRPM

## 2020-07-26 PROCEDURE — 6370000000 HC RX 637 (ALT 250 FOR IP): Performed by: NURSE PRACTITIONER

## 2020-07-26 PROCEDURE — 99213 OFFICE O/P EST LOW 20 MIN: CPT

## 2020-07-26 PROCEDURE — 99213 OFFICE O/P EST LOW 20 MIN: CPT | Performed by: NURSE PRACTITIONER

## 2020-07-26 RX ORDER — LORATADINE 10 MG
1 CAPSULE ORAL 2 TIMES DAILY PRN
Qty: 60 CAPSULE | Refills: 0 | Status: SHIPPED | OUTPATIENT
Start: 2020-07-26 | End: 2020-10-06

## 2020-07-26 RX ORDER — ONDANSETRON 4 MG/1
4 TABLET, ORALLY DISINTEGRATING ORAL EVERY 8 HOURS PRN
Qty: 20 TABLET | Refills: 0 | Status: SHIPPED | OUTPATIENT
Start: 2020-07-26 | End: 2022-01-03

## 2020-07-26 RX ORDER — ONDANSETRON 4 MG/1
4 TABLET, ORALLY DISINTEGRATING ORAL ONCE
Status: COMPLETED | OUTPATIENT
Start: 2020-07-26 | End: 2020-07-26

## 2020-07-26 RX ADMIN — ONDANSETRON 4 MG: 4 TABLET, ORALLY DISINTEGRATING ORAL at 17:50

## 2020-07-26 ASSESSMENT — PAIN - FUNCTIONAL ASSESSMENT: PAIN_FUNCTIONAL_ASSESSMENT: PREVENTS OR INTERFERES SOME ACTIVE ACTIVITIES AND ADLS

## 2020-07-26 ASSESSMENT — PAIN SCALES - GENERAL: PAINLEVEL_OUTOF10: 4

## 2020-07-26 ASSESSMENT — ENCOUNTER SYMPTOMS
VOMITING: 1
NAUSEA: 1
ABDOMINAL PAIN: 0
DIARRHEA: 1
COUGH: 1
SORE THROAT: 0
SHORTNESS OF BREATH: 0

## 2020-07-26 ASSESSMENT — PAIN DESCRIPTION - PROGRESSION: CLINICAL_PROGRESSION: NOT CHANGED

## 2020-07-26 ASSESSMENT — PAIN DESCRIPTION - FREQUENCY: FREQUENCY: INTERMITTENT

## 2020-07-26 ASSESSMENT — PAIN DESCRIPTION - DESCRIPTORS: DESCRIPTORS: ACHING;CRAMPING

## 2020-07-26 ASSESSMENT — PAIN DESCRIPTION - ONSET: ONSET: PROGRESSIVE

## 2020-07-26 ASSESSMENT — PAIN DESCRIPTION - LOCATION: LOCATION: ABDOMEN

## 2020-07-26 ASSESSMENT — PAIN DESCRIPTION - PAIN TYPE: TYPE: ACUTE PAIN

## 2020-07-26 NOTE — ED TRIAGE NOTES
Pt to room 2 with his wife and c/o nausea/vomiting (5 episodes in the last 24 hours), diarrhea (2 episodes in the last 24 hours) and felling very tired and fatigued in the last 2 days. He also states he \"aches\" all over. He denies any SOB, chest pain and/or numbness or tingling in any arms. He states he was at his annual check up with Dr Katina Scales office and given a \"clean bill of health\". He also reports that he called Dr Sebastian Duran today and spoke to the on call service, was told to come to  because he probably needs a chest x ray, COVID swab and other testing.

## 2020-07-26 NOTE — ED PROVIDER NOTES
[facility]  Urgent Care Encounter       CHIEF COMPLAINT       Chief Complaint   Patient presents with    Fatigue    Emesis     x 5 in the last 24 hours    Diarrhea     x 2 in the lst 24 hours    Generalized Body Aches       Nurses Notes reviewed and I agree except as noted in the HPI. HISTORY OF PRESENT ILLNESS   Star Boyle is a 48 y.o. male who presents for evaluation of nausea, vomiting, diarrhea that began last night has continued through today. Patient states that he feels somewhat weak and fatigued with mild body aches. He does report a moist cough with postnasal drainage, however states that he does have a history of seasonal allergies for which he takes Flonase. He denies any recorded fever but states that he has felt chilled somewhat at home. He states that he has not taken any medications in the past 12 hours for his symptoms. The history is provided by the patient. REVIEW OF SYSTEMS     Review of Systems   Constitutional: Positive for chills. Negative for fever. HENT: Negative for congestion and sore throat. Respiratory: Positive for cough. Negative for shortness of breath. Cardiovascular: Negative for chest pain. Gastrointestinal: Positive for diarrhea, nausea and vomiting. Negative for abdominal pain. Musculoskeletal: Negative for arthralgias and myalgias. Skin: Negative for rash. Allergic/Immunologic: Positive for environmental allergies. Neurological: Negative for headaches. PAST MEDICAL HISTORY         Diagnosis Date    ASHD (arteriosclerotic heart disease)    Nimisha Willswer Dyslipidemia     Former smoker     GERD (gastroesophageal reflux disease)     Heart attack ([facility] [address]     Heart failure with preserved ejection fraction (HCC)     Hx of prior LV dysfunction    Hypertension, essential     Mitral regurgitation     Nicotine dependence, chewing tobacco, uncomplicated     Obesity (BMI 30-39. 9)     S/P coronary artery stent placement  S/P mitral valve repair        SURGICALHISTORY     Patient  has a past surgical history that includes Appendectomy; Coronary angioplasty; Mitral valve repair; Cardiac catheterization; Inguinal hernia repair; ventral hernia repair (05/13/2016); Inguinal hernia repair (Left, 05/13/2016); and Rotator cuff repair (Right). CURRENT MEDICATIONS       Previous Medications    AMLODIPINE (NORVASC) 10 MG TABLET    Take 1 tablet by mouth daily    ASPIRIN EC 81 MG EC TABLET    Take 1 tablet by mouth daily    ATORVASTATIN (LIPITOR) 20 MG TABLET    Take 1 tablet by mouth daily    FLUTICASONE (FLONASE) 50 MCG/ACT NASAL SPRAY    1 spray by Nasal route daily    LISINOPRIL (PRINIVIL;ZESTRIL) 20 MG TABLET    Take 1 tablet by mouth daily    NEBIVOLOL (BYSTOLIC) 2.5 MG TABLET    Take 1 tablet by mouth daily    VARENICLINE (CHANTIX CONTINUING MONTH MANISHA) 1 MG TABLET    Take 1 tablet by mouth 2 times daily    VARENICLINE (CHANTIX STARTING MONTH MANISHA) 0.5 MG X 11 & 1 MG X 42 TABLET    Take by mouth as directed on box       ALLERGIES     Patient is has No Known Allergies. Patients   There is no immunization history on file for this patient. FAMILY HISTORY     Patient's family history includes No Known Problems in his father and mother. SOCIAL HISTORY     Patient  reports that he has never smoked. He has quit using smokeless tobacco.  His smokeless tobacco use included chew. He reports previous alcohol use. He reports that he does not use drugs. PHYSICAL EXAM     ED TRIAGE VITALS  BP: (!) 137/93, Temp: 98.3 °F (36.8 °C), Pulse: 63, Resp: 16, SpO2: 94 %,Estimated body mass index is 32.42 kg/m² as calculated from the following:    Height as of this encounter: 5' 8\" (1.727 m). Weight as of this encounter: 213 lb 4 oz (96.7 kg). ,No LMP for male patient. Physical Exam  Vitals signs and nursing note reviewed. Constitutional:       General: He is not in acute distress. Appearance: He is well-developed.  He is not diaphoretic. HENT:      Right Ear: Tympanic membrane is bulging. Tympanic membrane is not erythematous. Left Ear: Tympanic membrane is bulging. Tympanic membrane is not erythematous. Mouth/Throat:      Mouth: Mucous membranes are moist.      Pharynx: Oropharynx is clear. Tonsils: 0 on the right. 0 on the left. Eyes:      Conjunctiva/sclera:      Right eye: Right conjunctiva is not injected. Left eye: Left conjunctiva is not injected. Pupils: Pupils are equal.   Neck:      Musculoskeletal: Normal range of motion. Cardiovascular:      Rate and Rhythm: Normal rate and regular rhythm. Heart sounds: No murmur. Pulmonary:      Effort: Pulmonary effort is normal. No respiratory distress. Breath sounds: Normal breath sounds. Abdominal:      Palpations: Abdomen is soft. Tenderness: There is no abdominal tenderness. Musculoskeletal:      Right knee: He exhibits normal range of motion. Left knee: He exhibits normal range of motion. Skin:     General: Skin is warm. Findings: No rash. Neurological:      Mental Status: He is alert and oriented to person, place, and time. Psychiatric:         Behavior: Behavior normal.         DIAGNOSTIC RESULTS     Labs:No results found for this visit on 07/26/20. IMAGING:    No orders to display         EKG:  none    URGENT CARE COURSE:     Vitals:    07/26/20 1721   BP: (!) 137/93   Pulse: 63   Resp: 16   Temp: 98.3 °F (36.8 °C)   TempSrc: Oral   SpO2: 94%   Weight: 213 lb 4 oz (96.7 kg)   Height: 5' 8\" (1.727 m)       Medications   ondansetron (ZOFRAN-ODT) disintegrating tablet 4 mg (has no administration in time range)            PROCEDURES:  None    FINAL IMPRESSION      1. Gastroenteritis    2. Seasonal allergies          DISPOSITION/ PLAN     I discussed with the patient that I believe his symptoms are likely related to a viral stomach illness on top of his history of seasonal allergies.   Patient is advised on the plan to treat symptomatically with Coricidin with Mucinex as well as Zofran and he is given a Zofran within the department today. Patient is advised to follow-up if his symptoms seem to worsen or do not improve and he is agreeable to plan as discussed.       PATIENT REFERRED TO:  [address] / [address] [id number]      DISCHARGE MEDICATIONS:  New Prescriptions    DEXTROMETHORPHAN-GUAIFENESIN (CORICIDIN HBP CONGESTION/COUGH)  MG CAPS    Take 1 capsule by mouth 2 times daily as needed (cough/congestion)    ONDANSETRON (ZOFRAN ODT) 4 MG DISINTEGRATING TABLET    Place 1 tablet under the tongue every 8 hours as needed for Nausea or Vomiting       Discontinued Medications    No medications on file       Current Discharge Medication List          TAMELA Alarcon CNP    (Please note that portions of this note were completed with a voice recognition program. Efforts were made to edit the dictations but occasionally words are mis-transcribed.)         TAMELA Alarcon CNP  07/26/20 8262

## 2020-07-26 NOTE — ED NOTES
Patient understood instructions verbally,  Follow up with PCP with any concerns, or worse GI symptoms with fever, vomiting, chills,follow up with ED. prescriptions with patient. ambulated self to lobby,stable condition.       Carroll Rand LPN  38/01/75 [id number]

## 2020-07-27 ENCOUNTER — CARE COORDINATION (OUTPATIENT)
Dept: CARE COORDINATION | Age: 55
End: 2020-07-27

## 2020-07-27 NOTE — CARE COORDINATION
Attempted to reach patient for a ED follow up in regards to COVID-19 education/ monitoring. Patient was unavailable at the time of my call, and a generic voicemail message was left asking patient to return my call at [phone].     Katelynn Rodriguez [facility]  [address] [facility]   Medication Assistance  [address]    (B)[phone]  (I)[phone]

## 2020-08-21 ENCOUNTER — TELEPHONE (OUTPATIENT)
Dept: FAMILY MEDICINE CLINIC | Age: 55
End: 2020-08-21

## 2020-10-06 ENCOUNTER — OFFICE VISIT (OUTPATIENT)
Dept: FAMILY MEDICINE CLINIC | Age: 55
End: 2020-10-06
Payer: COMMERCIAL

## 2020-10-06 ENCOUNTER — HOSPITAL ENCOUNTER (OUTPATIENT)
Age: 55
Discharge: HOME | End: 2020-10-06
Payer: COMMERCIAL

## 2020-10-06 ENCOUNTER — HOSPITAL ENCOUNTER (OUTPATIENT)
Dept: GENERAL RADIOLOGY | Age: 55
Discharge: HOME | End: 2020-10-06
Payer: COMMERCIAL

## 2020-10-06 VITALS
RESPIRATION RATE: 16 BRPM | BODY MASS INDEX: 29.49 KG/M2 | TEMPERATURE: 98.1 F | SYSTOLIC BLOOD PRESSURE: 120 MMHG | HEIGHT: 70 IN | HEART RATE: 61 BPM | WEIGHT: 206 LBS | DIASTOLIC BLOOD PRESSURE: 82 MMHG

## 2020-10-06 PROCEDURE — 99214 OFFICE O/P EST MOD 30 MIN: CPT | Performed by: FAMILY MEDICINE

## 2020-10-06 PROCEDURE — 73564 X-RAY EXAM KNEE 4 OR MORE: CPT

## 2020-10-06 NOTE — PROGRESS NOTES
Chief Complaint   Patient presents with    Knee Pain     L knee    Nicotine Dependence       History obtained from the patient. SUBJECTIVE:  Tuyet Ohara is a 48 y.o. male that presents today for       -L Knee Pain:    HPI:   Started 1.5 wks ago  Was running when happened   [place] pop, knee gave out  Pain every since  Feels unstable  Gives out on and off  No locking or catching  Pain constant  Less at rest  Worse with walking and going down stairs    Inciting injury or history of trauma? Yes  Pain is relieved by - rest  Pain is aggravated by - walking, running, going down stairs  Locking or catching of the knee? No  Radiation of the pain? No  Paresthesias of the extremities? No  Decreased ROM? Yes  Edema? No  Difficulty bearing weight? No  Worse with stairs? Yes  Treatments tried - ice, motrin. Not helping      -Chewing tobacco:  Done with chantix  Not using tobacco  Doing well.        Age/Gender Health Maintenance    Lipid -   Lab Results   Component Value Date    CHOL 148 01/15/2020     Lab Results   Component Value Date    TRIG 60 01/15/2020     Lab Results   Component Value Date    HDL 57 01/15/2020     Lab Results   Component Value Date    LDLCALC 79 01/15/2020     No results found for: LABVLDL, VLDL  No results found for: CHOLHDLRATIO      DM Screen -   Lab Results   Component Value Date    GLUCOSE 102 01/15/2020     No results found for: LABA1C    Colon Cancer Screening - NEG FEB 2013, repeat 10 years  Lung Cancer Screening (Age 54 to [de-identified] with 30 pack year hx, current smoker or quit within past 13 years) - age 54 if meets criteria    Tetanus - declines July 2020  Influenza Vaccine - Candidate FALL 2020  Pneumonia Vaccine - declines July 2020  Zoster - declines July 2020     PSA testing discussion - age 54  AAA Screening - age 72 if smoked    Falls screening - n/a      Current Outpatient Medications   Medication Sig Dispense Refill    ondansetron (ZOFRAN ODT) 4 MG disintegrating tablet Place 1 tablet under the tongue every 8 hours as needed for Nausea or Vomiting 20 tablet 0    lisinopril (PRINIVIL;ZESTRIL) 20 MG tablet Take 1 tablet by mouth daily 90 tablet 3    nebivolol (BYSTOLIC) 2.5 MG tablet Take 1 tablet by mouth daily 90 tablet 3    atorvastatin (LIPITOR) 20 MG tablet Take 1 tablet by mouth daily 90 tablet 3    amLODIPine (NORVASC) 10 MG tablet Take 1 tablet by mouth daily      varenicline (CHANTIX CONTINUING MONTH PAK) 1 MG tablet Take 1 tablet by mouth 2 times daily 60 tablet 1    varenicline (CHANTIX STARTING MONTH PAK) 0.5 MG X 11 & 1 MG X 42 tablet Take by mouth as directed on box 1 box 0    aspirin EC 81 MG EC tablet Take 1 tablet by mouth daily 90 tablet 1     No current facility-administered medications for this visit. No orders of the defined types were placed in this encounter. All medications reviewed and reconciled, including OTC and herbal medications. Updated list given to patient. Patient Active Problem List    Diagnosis Date Noted    Dysfunction of both eustachian tubes 07/09/2020    Right lateral epicondylitis 07/09/2020    ASHD (arteriosclerotic heart disease)     Dyslipidemia     Former smoker     Heart failure with preserved ejection fraction (HCC)      Hx of prior LV dysfunction      Hypertension, essential     Mitral regurgitation     Nicotine dependence, chewing tobacco, uncomplicated     Obesity (BMI 30-39. 9)     S/P coronary artery stent placement     S/P mitral valve repair     Coronary artery disease involving native coronary artery of native heart without angina pectoris 06/71/1371    Diastolic murmur 78/25/2554    Family history of early CAD 12/07/2015    LVH (left ventricular hypertrophy) 12/07/2015       Past Medical History:   Diagnosis Date    ASHD (arteriosclerotic heart disease)     Dyslipidemia     Former smoker     GERD (gastroesophageal reflux disease)     [facility] [address]     Heart failure with preserved ejection fraction (HCC)     Hx of prior LV dysfunction    Hypertension, essential     Mitral regurgitation     Nicotine dependence, chewing tobacco, uncomplicated     Obesity (BMI 30-39. 9)     S/P coronary artery stent placement     S/P mitral valve repair          Past Surgical History:   Procedure Laterality Date    APPENDECTOMY      CARDIAC CATHETERIZATION      CORONARY ANGIOPLASTY      INGUINAL HERNIA REPAIR      INGUINAL HERNIA REPAIR Left 05/13/2016    MITRAL VALVE REPAIR      ROTATOR CUFF REPAIR Right     right and left    VENTRAL HERNIA REPAIR  05/13/2016         No Known Allergies      Social History     Tobacco Use    Smoking status: Never Smoker    Smokeless tobacco: Former User     Types: Chew   Substance Use Topics    Alcohol use: Not Currently         Family History   Problem Relation Age of Onset    No Known Problems Mother     No Known Problems Father     Colon Cancer Neg Hx     Prostate Cancer Neg Hx          I have reviewed the patient's past medical history, past surgical history, allergies, medications, social and family history and I have made updates where appropriate.       Review of Systems  Positive responses are highlighted in bold    Constitutional:  Fever, Chills, Night Sweats, Fatigue, Unexpected changes in weight  HENT:  Ear pain, Tinnitus, Nosebleeds, Trouble swallowing, Hearing loss, Sore throat  Cardiovascular:  Chest Pain, Palpitations, Orthopnea, Paroxysmal Nocturnal Dyspnea  Respiratory:  Cough, Wheezing, Shortness of breath, Chest tightness, Apnea  Gastrointestinal:  Nausea, Vomiting, Diarrhea, Constipation, Heartburn, Blood in stool  Genitourinary:  Difficulty or painful urination, Flank pain, Change in frequency, Urgency  Skin:  Color change, Rash, Itching, Wound  Musculoskeletal:  Joint pain, Back pain, Gait problems, Joint swelling, Myalgias  Neurological:  Dizziness, Headaches, Presyncope, Numbness, Seizures, Tremors  Endocrine:  Heat Intolerance, Cold Intolerance, Polydipsia, Polyphagia, Polyuria      PHYSICAL EXAM:  Vitals:    10/06/20 1619   BP: 120/82   Pulse: 61   Resp: 16   Temp: 98.1 °F (36.7 °C)   Weight: 206 lb (93.4 kg)   Height: 5' 9.5\" (1.765 m)     Body mass index is 29.98 kg/m². Pain Score:   4(pain goes to 6-7 with ambulation)    VS Reviewed  General Appearance: A&O x 3, No acute distress,well developed and well- nourished  Eyes: pupils equal, round, and reactive to light, extraocular eye movements intact, conjunctivae and eye lids without erythema  ENT: external ear and ear canal clear bilaterally, TMs intact and regular, nose without deformity, nasal mucosa and turbinates normal without polyps, oropharynx normal, dentition is normal for age  Neck: supple and non-tender without mass, no thyromegaly or thyroid nodules, no cervical lymphadenopathy  Pulmonary/Chest: clear to auscultation bilaterally- no wheezes, rales or rhonchi, normal air movement, no respiratory distress or retractions  Cardiovascular: S1 and S2 auscultated w/ RRR. No murmurs, rubs, clicks, or gallops, distal pulses intact. Abdomen: soft, non-tender, non-distended, bowel sounds physiologic,  no rebound or guarding, no masses or hernias noted. Liver and spleen without enlargement. Extremities: no cyanosis, clubbing or edema of the lower extremities. Skin: warm and dry, no rash or erythema  L KNEE EXAM:  Examination of the left  knee shows: The alignment of the knee is neutral.   There is not erythema. There mild soft tissue swelling. There is mild effusion. ROM-  Extension -5           -   Flexion  130   There mild pain associated with ROM testing. Medial joint line none tender to palpation. Lateral joint line moderate tender to palpation. Retro patellar crepitus is not present. There is none crepitus along the joint line with ROM testing. Varus Stress testing does not produce pain,                                     does not show laxity.    Valgus Stress testing does not produce pain,                                       does not show laxity. Lachman's test is positive. Anterior Drawer test is Positive. Posterior Drawer test is Negative  Camila's Test is Negative. Patellar Compression testing does not produce pain. Extensor Mechanism is  intact. ASSESSMENT & PLAN  1. Acute pain of left knee    Concern for possible ACL tear based on instability and + lackman/drawer  Will get xray/MRI  Given exercises  Trial knee brace  Further w/u and treatment based on MRI results    - XR KNEE LEFT (MIN 4 VIEWS); Future  - MRI KNEE LEFT WO CONTRAST; Future    2. Positive Lachman Test    - XR KNEE LEFT (MIN 4 VIEWS); Future  - MRI KNEE LEFT WO CONTRAST; Future    3. Knee instability, left    - XR KNEE LEFT (MIN 4 VIEWS); Future  - MRI KNEE LEFT WO CONTRAST; Future    4. Chewing tobacco nicotine dependence in remission    con't with cessation efforts, doing well      DISPOSITION    Return if symptoms worsen or fail to improve. Gurjit Lamer released without restrictions. Future Appointments   Date Time Provider Moiz Hernández   1/22/2021  9:15 AM Toni Paredes MD [address] [facility] - [address] [place] [address]    Patient given educational materials on: See Attached    Barriers to learning and self management: none    Discussed use, benefit, and side effects of prescribed medications. Barriers to medication compliance addressed. All patient questions answered. Pt voiced understanding.        Electronically signed by Jack Malik DO on 10/6/2020 at 4:56 PM

## 2020-10-08 ENCOUNTER — TELEPHONE (OUTPATIENT)
Dept: FAMILY MEDICINE CLINIC | Age: 55
End: 2020-10-08

## 2020-10-08 NOTE — TELEPHONE ENCOUNTER
----- Message from Chen Soriano DO sent at 10/7/2020  6:49 PM EDT -----  Please let pt know that xray of knee is WNL  con't with plan as discussed in clinic. Let me know if questions, thanks!

## 2020-10-29 ENCOUNTER — HOSPITAL ENCOUNTER (EMERGENCY)
Age: 55
Discharge: HOME | End: 2020-10-29
Attending: EMERGENCY MEDICINE
Payer: COMMERCIAL

## 2020-10-29 ENCOUNTER — APPOINTMENT (OUTPATIENT)
Dept: GENERAL RADIOLOGY | Age: 55
End: 2020-10-29
Payer: COMMERCIAL

## 2020-10-29 VITALS
DIASTOLIC BLOOD PRESSURE: 87 MMHG | OXYGEN SATURATION: 96 % | RESPIRATION RATE: 23 BRPM | WEIGHT: 193 LBS | BODY MASS INDEX: 27.63 KG/M2 | TEMPERATURE: 98.2 F | HEIGHT: 70 IN | SYSTOLIC BLOOD PRESSURE: 122 MMHG | HEART RATE: 66 BPM

## 2020-10-29 LAB
ANION GAP SERPL CALCULATED.3IONS-SCNC: 10 MEQ/L (ref 8–16)
ATYPICAL LYMPHOCYTES: ABNORMAL %
BASOPHILS # BLD: 0.5 %
BASOPHILS ABSOLUTE: 0 THOU/MM3 (ref 0–0.1)
BUN BLDV-MCNC: 17 MG/DL (ref 7–22)
CALCIUM SERPL-MCNC: 9.1 MG/DL (ref 8.5–10.5)
CHLORIDE BLD-SCNC: 101 MEQ/L (ref 98–111)
CO2: 26 MEQ/L (ref 23–33)
CREAT SERPL-MCNC: 0.9 MG/DL (ref 0.4–1.2)
EKG ATRIAL RATE: 65 BPM
EKG P AXIS: 36 DEGREES
EKG P-R INTERVAL: 136 MS
EKG Q-T INTERVAL: 422 MS
EKG QRS DURATION: 76 MS
EKG QTC CALCULATION (BAZETT): 438 MS
EKG R AXIS: 19 DEGREES
EKG T AXIS: 43 DEGREES
EKG VENTRICULAR RATE: 65 BPM
EOSINOPHIL # BLD: 3.5 %
EOSINOPHILS ABSOLUTE: 0.3 THOU/MM3 (ref 0–0.4)
ERYTHROCYTE [DISTWIDTH] IN BLOOD BY AUTOMATED COUNT: 12.3 % (ref 11.5–14.5)
ERYTHROCYTE [DISTWIDTH] IN BLOOD BY AUTOMATED COUNT: 43.1 FL (ref 35–45)
FLU A ANTIGEN: NEGATIVE
FLU B ANTIGEN: NEGATIVE
GFR SERPL CREATININE-BSD FRML MDRD: 88 ML/MIN/1.73M2
GLUCOSE BLD-MCNC: 110 MG/DL (ref 70–108)
GROUP A STREP CULTURE, REFLEX: NEGATIVE
HCT VFR BLD CALC: 45.4 % (ref 42–52)
HEMOGLOBIN: 15.1 GM/DL (ref 14–18)
IMMATURE GRANS (ABS): 0.01 THOU/MM3 (ref 0–0.07)
IMMATURE GRANULOCYTES: 0.1 %
LYMPHOCYTES # BLD: 36.8 %
LYMPHOCYTES ABSOLUTE: 2.8 THOU/MM3 (ref 1–4.8)
MAGNESIUM: 2.1 MG/DL (ref 1.6–2.4)
MCH RBC QN AUTO: 31.9 PG (ref 26–33)
MCHC RBC AUTO-ENTMCNC: 33.3 GM/DL (ref 32.2–35.5)
MCV RBC AUTO: 95.8 FL (ref 80–94)
MONOCYTES # BLD: 10.6 %
MONOCYTES ABSOLUTE: 0.8 THOU/MM3 (ref 0.4–1.3)
NUCLEATED RED BLOOD CELLS: 0 /100 WBC
OSMOLALITY CALCULATION: 276 MOSMOL/KG (ref 275–300)
PLATELET # BLD: 208 THOU/MM3 (ref 130–400)
PLATELET ESTIMATE: ADEQUATE
PMV BLD AUTO: 8.9 FL (ref 9.4–12.4)
POTASSIUM SERPL-SCNC: 4.4 MEQ/L (ref 3.5–5.2)
PRO-BNP: 8.3 PG/ML (ref 0–900)
RBC # BLD: 4.74 MILL/MM3 (ref 4.7–6.1)
REFLEX THROAT C + S: NORMAL
SCAN OF BLOOD SMEAR: NORMAL
SEG NEUTROPHILS: 48.5 %
SEGMENTED NEUTROPHILS ABSOLUTE COUNT: 3.6 THOU/MM3 (ref 1.8–7.7)
SODIUM BLD-SCNC: 137 MEQ/L (ref 135–145)
TROPONIN T: < 0.01 NG/ML
WBC # BLD: 7.5 THOU/MM3 (ref 4.8–10.8)

## 2020-10-29 PROCEDURE — 71045 X-RAY EXAM CHEST 1 VIEW: CPT

## 2020-10-29 PROCEDURE — 87880 STREP A ASSAY W/OPTIC: CPT

## 2020-10-29 PROCEDURE — 6370000000 HC RX 637 (ALT 250 FOR IP): Performed by: EMERGENCY MEDICINE

## 2020-10-29 PROCEDURE — 93005 ELECTROCARDIOGRAM TRACING: CPT | Performed by: EMERGENCY MEDICINE

## 2020-10-29 PROCEDURE — 87804 INFLUENZA ASSAY W/OPTIC: CPT

## 2020-10-29 PROCEDURE — 84484 ASSAY OF TROPONIN QUANT: CPT

## 2020-10-29 PROCEDURE — 80048 BASIC METABOLIC PNL TOTAL CA: CPT

## 2020-10-29 PROCEDURE — U0003 INFECTIOUS AGENT DETECTION BY NUCLEIC ACID (DNA OR RNA); SEVERE ACUTE RESPIRATORY SYNDROME CORONAVIRUS 2 (SARS-COV-2) (CORONAVIRUS DISEASE [COVID-19]), AMPLIFIED PROBE TECHNIQUE, MAKING USE OF HIGH THROUGHPUT TECHNOLOGIES AS DESCRIBED BY CMS-2020-01-R: HCPCS

## 2020-10-29 PROCEDURE — 85025 COMPLETE CBC W/AUTO DIFF WBC: CPT

## 2020-10-29 PROCEDURE — 36415 COLL VENOUS BLD VENIPUNCTURE: CPT

## 2020-10-29 PROCEDURE — 83735 ASSAY OF MAGNESIUM: CPT

## 2020-10-29 PROCEDURE — 99285 EMERGENCY DEPT VISIT HI MDM: CPT

## 2020-10-29 PROCEDURE — 83880 ASSAY OF NATRIURETIC PEPTIDE: CPT

## 2020-10-29 PROCEDURE — 87070 CULTURE OTHR SPECIMN AEROBIC: CPT

## 2020-10-29 RX ORDER — FLUTICASONE PROPIONATE 50 MCG
1 SPRAY, SUSPENSION (ML) NASAL DAILY
Qty: 1 BOTTLE | Refills: 0 | Status: SHIPPED | OUTPATIENT
Start: 2020-10-29 | End: 2020-11-17 | Stop reason: SDUPTHER

## 2020-10-29 RX ORDER — GUAIFENESIN/DEXTROMETHORPHAN 100-10MG/5
5 SYRUP ORAL ONCE
Status: COMPLETED | OUTPATIENT
Start: 2020-10-29 | End: 2020-10-29

## 2020-10-29 RX ORDER — ALBUTEROL SULFATE 90 UG/1
2 AEROSOL, METERED RESPIRATORY (INHALATION) 4 TIMES DAILY PRN
Qty: 1 INHALER | Refills: 0 | Status: SHIPPED | OUTPATIENT
Start: 2020-10-29

## 2020-10-29 RX ORDER — PSEUDOEPHEDRINE HYDROCHLORIDE 30 MG/1
30 TABLET ORAL EVERY 4 HOURS PRN
Status: DISCONTINUED | OUTPATIENT
Start: 2020-10-29 | End: 2020-10-29 | Stop reason: HOSPADM

## 2020-10-29 RX ORDER — GUAIFENESIN 600 MG/1
600 TABLET, EXTENDED RELEASE ORAL 2 TIMES DAILY
Qty: 30 TABLET | Refills: 0 | Status: SHIPPED | OUTPATIENT
Start: 2020-10-29 | End: 2020-11-13

## 2020-10-29 RX ORDER — ALBUTEROL SULFATE 90 UG/1
2 AEROSOL, METERED RESPIRATORY (INHALATION) EVERY 6 HOURS PRN
Status: DISCONTINUED | OUTPATIENT
Start: 2020-10-29 | End: 2020-10-29 | Stop reason: HOSPADM

## 2020-10-29 RX ORDER — CETIRIZINE HYDROCHLORIDE 10 MG/1
10 TABLET ORAL DAILY
Qty: 30 TABLET | Refills: 0 | Status: SHIPPED | OUTPATIENT
Start: 2020-10-29 | End: 2020-11-28

## 2020-10-29 RX ADMIN — GUAIFENESIN AND DEXTROMETHORPHAN 5 ML: 100; 10 SYRUP ORAL at 01:45

## 2020-10-29 RX ADMIN — Medication 5 ML: at 02:11

## 2020-10-29 RX ADMIN — ALBUTEROL SULFATE 2 PUFF: 90 AEROSOL, METERED RESPIRATORY (INHALATION) at 01:54

## 2020-10-29 RX ADMIN — PSEUDOEPHEDRINE HCL 30 MG: 30 TABLET ORAL at 01:45

## 2020-10-29 ASSESSMENT — ENCOUNTER SYMPTOMS
WHEEZING: 0
CHOKING: 0
SINUS PRESSURE: 0
PHOTOPHOBIA: 0
ABDOMINAL PAIN: 0
CONSTIPATION: 0
SHORTNESS OF BREATH: 1
VOMITING: 0
SORE THROAT: 0
EYE REDNESS: 0
ABDOMINAL DISTENTION: 0
RHINORRHEA: 0
DIARRHEA: 0
EYE DISCHARGE: 0
EYE ITCHING: 0
EYE PAIN: 0
BACK PAIN: 0
COUGH: 1
NAUSEA: 0
VOICE CHANGE: 0
CHEST TIGHTNESS: 1
TROUBLE SWALLOWING: 0
BLOOD IN STOOL: 0

## 2020-10-29 ASSESSMENT — PAIN DESCRIPTION - DESCRIPTORS: DESCRIPTORS: ACHING

## 2020-10-29 ASSESSMENT — PAIN DESCRIPTION - PAIN TYPE: TYPE: ACUTE PAIN

## 2020-10-29 ASSESSMENT — PAIN SCALES - GENERAL: PAINLEVEL_OUTOF10: 5

## 2020-10-29 ASSESSMENT — PAIN DESCRIPTION - LOCATION: LOCATION: HEAD

## 2020-10-29 ASSESSMENT — PAIN DESCRIPTION - FREQUENCY: FREQUENCY: CONTINUOUS

## 2020-10-29 ASSESSMENT — PAIN DESCRIPTION - ONSET: ONSET: ON-GOING

## 2020-10-29 NOTE — ED NOTES
Pt updated on POC.  Pt COVID swab collected labeled and sent to lab     Judy Blount, [facility]  10/29/20 2905

## 2020-10-29 NOTE — ED PROVIDER NOTES
[facility]  eMERGENCY dEPARTMENT eNCOUnter          [address] [facility]       Chief Complaint   Patient presents with    Cough    Fatigue    Headache       Nurses Notes reviewed and I agree except as noted in the HPI. HISTORY OF PRESENT ILLNESS    Reanna Montes is a 48 y.o. male who presents cough congestion. No fevers. Patient states that he was just in [place] in very cold weather. He also came home and was exposed to his brother who may have had Covid. Patient is not having any difficulty breathing. Patient is currently resting comfortably on the cot no apparent distress no other physical complaints at this time. No loss of smell or taste. No diarrhea. No chest pain or shortness of breath. REVIEW OF SYSTEMS     Review of Systems   Constitutional: Negative for activity change, appetite change, diaphoresis, fatigue and unexpected weight change. HENT: Negative for congestion, ear discharge, ear pain, hearing loss, rhinorrhea, sinus pressure, sore throat, trouble swallowing and voice change. Eyes: Negative for photophobia, pain, discharge, redness and itching. Respiratory: Positive for cough, chest tightness and shortness of breath. Negative for choking and wheezing. Cardiovascular: Negative for chest pain, palpitations and leg swelling. Gastrointestinal: Negative for abdominal distention, abdominal pain, blood in stool, constipation, diarrhea, nausea and vomiting. Endocrine: Negative for polydipsia, polyphagia and polyuria. Genitourinary: Negative for decreased urine volume, difficulty urinating, dysuria, enuresis, frequency, hematuria and urgency. Musculoskeletal: Negative for arthralgias, back pain, gait problem, myalgias, neck pain and neck stiffness. Skin: Negative for pallor and rash. Allergic/Immunologic: Negative for immunocompromised state.    Neurological: Negative for dizziness, tremors, seizures, syncope, facial asymmetry, weakness, light-headedness, numbness and headaches. Hematological: Negative for adenopathy. Does not bruise/bleed easily. Psychiatric/Behavioral: Negative for agitation, hallucinations and suicidal ideas. The patient is not nervous/anxious. PAST MEDICAL HISTORY    has a past medical history of ASHD (arteriosclerotic heart disease), Dyslipidemia, Former smoker, GERD (gastroesophageal reflux disease), Heart attack ([address] Heart failure with preserved ejection fraction ([address] Hypertension, essential, Mitral regurgitation, Nicotine dependence, chewing tobacco, uncomplicated, Obesity (BMI 30-39.9), S/P coronary artery stent placement, and S/P mitral valve repair. SURGICAL HISTORY      has a past surgical history that includes Appendectomy; Coronary angioplasty; Mitral valve repair; Cardiac catheterization; Inguinal hernia repair; ventral hernia repair (2016); Inguinal hernia repair (Left, 2016); and Rotator cuff repair (Right). CURRENT MEDICATIONS       Discharge Medication List as of 10/29/2020  2:04 AM      CONTINUE these medications which have NOT CHANGED    Details   ondansetron (ZOFRAN ODT) 4 MG disintegrating tablet Place 1 tablet under the tongue every 8 hours as needed for Nausea or Vomiting, Disp-20 tablet,R-0Normal      lisinopril (PRINIVIL;ZESTRIL) 20 MG tablet Take 1 tablet by mouth daily, Disp-90 tablet,R-3Normal      nebivolol (BYSTOLIC) 2.5 MG tablet Take 1 tablet by mouth daily, Disp-90 tablet,R-3Normal      atorvastatin (LIPITOR) 20 MG tablet Take 1 tablet by mouth daily, Disp-90 tablet,R-3Normal      amLODIPine (NORVASC) 10 MG tablet Take 1 tablet by mouth dailyHistorical Med      aspirin EC 81 MG EC tablet Take 1 tablet by mouth daily, Disp-90 tablet, R-1OTC             ALLERGIES     has No Known Allergies. FAMILY HISTORY     He indicated that his mother is . He indicated that his father is alive.  He indicated that the status of his neg hx is unknown.   family history WITH AUTO DIFFERENTIAL - Abnormal; Notable for the following components:       Result Value    MCV 95.8 (*)     MPV 8.9 (*)     All other components within normal limits   BASIC METABOLIC PANEL - Abnormal; Notable for the following components:    Glucose 110 (*)     All other components within normal limits   GLOMERULAR FILTRATION RATE, ESTIMATED - Abnormal; Notable for the following components:    Est, Glom Filt Rate 88 (*)     All other components within normal limits   RAPID INFLUENZA A/B ANTIGENS   CULTURE, THROAT    Narrative:     Source: Specimen not received       Site:           Current Antibiotics:   BRAIN NATRIURETIC PEPTIDE   MAGNESIUM   TROPONIN   GROUP A STREP, REFLEX   ANION GAP   OSMOLALITY   SCAN OF BLOOD SMEAR   COVID-19   COVID-19       EMERGENCY DEPARTMENT COURSE:   Vitals:    Vitals:    10/29/20 0027 10/29/20 0059 10/29/20 0147   BP: (!) 142/95 123/83 122/87   Pulse: 66 65 66   Resp: 17 16 23   Temp: 98.2 °F (36.8 °C)     TempSrc: Oral     SpO2: 96% 97% 96%   Weight: 193 lb (87.5 kg)     Height: 5' 10\" (1.778 m)       Patient was assessed at bedside appropriate labs and imaging were ordered. Patient was given 2 DuoNeb treatments at bedside. I do believe that this is a bronchitis. X-rays here were negative as well as his labs. Patient will be tested for outpatient COVID-19 testing. Patient will be given breathing treatments for at home and albuterol inhaler was given here. He will also be given sinus medication. He was given miracle mouthwash because he does have some gingival irritation. I discussed this at bedside with the patient who understood and agreed with the plan. Patient is subsequently discharged home in good condition. Patient has what appears to be sinusitis bronchitis and gingivitis. Patient has been provided with miracle mouthwash she is instructed to swish and spit this 4 times a day for 5 days.   Patient also has been given prescriptions for cetirizine Flonase and Mucinex he is instructed take those as prescribed he is also been provided with a prescription for an albuterol inhaler. Patient is instructed to use all these medications as prescribed follow-up with his primary care physician call for an appointment within the next 1 to 2 days. Patient has had COVID-19 outpatient testing performed he is instructed to call in 4 days for those results. Patient is instructed to return to the nearest emergency room immediately for any new or worsening complaints. CRITICAL CARE:   None    CONSULTS:  None    PROCEDURES:  None    FINAL IMPRESSION      1. Bronchitis    2. Acute maxillary sinusitis, recurrence not specified    3.  Gingivitis          DISPOSITION/PLAN   Discharge    PATIENT REFERRED TO:  [address]  [facility] [address]  [phone]    Call in 1 day        DISCHARGE MEDICATIONS:  Discharge Medication List as of 10/29/2020  2:04 AM      START taking these medications    Details   cetirizine (ZYRTEC) 10 MG tablet Take 1 tablet by mouth daily, Disp-30 tablet,R-0Print      fluticasone (FLONASE) 50 MCG/ACT nasal spray 1 spray by Each Nostril route daily, Disp-1 Bottle,R-0Print      albuterol sulfate HFA (VENTOLIN HFA) 108 (90 Base) MCG/ACT inhaler Inhale 2 puffs into the lungs 4 times daily as needed for Wheezing, Disp-1 Inhaler,R-0Print      guaiFENesin (MUCINEX) 600 MG extended release tablet Take 1 tablet by mouth 2 times daily for 15 days, Disp-30 tablet,R-0Print             (Please note that portions of this note were completed with a voice recognition program.  Efforts were made to edit the dictations but occasionally words are mis-transcribed.)    Ross Morgan, [address], DO  10/29/20 3113

## 2020-10-29 NOTE — ED TRIAGE NOTES
Pt to ED via intake with c/o cough, headache and fatigue. Pt reports tonight they laid down and couldn't stop coughing. Pt reports their chest is \"rattling\". Pt denies feeling SOB. Pt is afebrile at this time. Pt respirations easy and unlabored. Pt VSS. Pt reports they recently just got home from a fishing trip and the water was cold. EKG completed, tele applied. Will continue to monitor.

## 2020-10-30 ENCOUNTER — CARE COORDINATION (OUTPATIENT)
Dept: CARE COORDINATION | Age: 55
End: 2020-10-30

## 2020-10-30 LAB — SARS-COV-2: NOT DETECTED

## 2020-10-30 NOTE — CARE COORDINATION
Enrolled into [employer] program       Patient contacted regarding Rolanda Figueroa. Discussed COVID-19 related testing which was pending at this time. Test results were pending. Patient informed of results, if available? No    Care Transition Nurse/ Ambulatory Care Manager contacted the patient by telephone to perform post discharge assessment. Call within 2 business days of discharge: Yes. Verified name and  with patient as identifiers. Provided introduction to self, and explanation of the CTN/ACM role, and reason for call due to risk factors for infection and/or exposure to COVID-19. Symptoms reviewed with patient who verbalized the following symptoms: fatigue, cough, shortness of breath and headache. Due to no new or worsening symptoms encounter was not routed to provider for escalation. Discussed follow-up appointments. If no appointment was previously scheduled, appointment scheduling offered: [facility] follow up appointment(s):   Future Appointments   Date Time Provider Moiz Hernández   2020  4:30 PM STR MRI RM1 STRZ MRI STR Radiolog   2021  9:15 AM [facility] [address]     Non-[facility] follow up appointment(s):     Non-face-to-face services provided:  Obtained and reviewed discharge summary and/or continuity of care documents     Advance Care Planning:   Does patient have an Advance Directive:  unable to review during call. Patient has following risk factors of: heart failure. CTN/ACM reviewed discharge instructions, medical action plan and red flags such as increased shortness of breath, increasing fever and signs of decompensation with patient who verbalized understanding. Discussed exposure protocols and quarantine with CDC Guidelines What to do if you are sick with coronavirus disease .  Patient was given an opportunity for questions and concerns.  The patient agrees to contact the [facility] exposure line [phone], local health department [place] Department of

## 2020-10-31 LAB — THROAT/NOSE CULTURE: NORMAL

## 2020-11-02 ENCOUNTER — VIRTUAL VISIT (OUTPATIENT)
Dept: FAMILY MEDICINE CLINIC | Age: 55
End: 2020-11-02
Payer: COMMERCIAL

## 2020-11-02 VITALS — RESPIRATION RATE: 14 BRPM

## 2020-11-02 PROCEDURE — 99213 OFFICE O/P EST LOW 20 MIN: CPT | Performed by: FAMILY MEDICINE

## 2020-11-02 RX ORDER — PREDNISONE 20 MG/1
40 TABLET ORAL DAILY
Qty: 10 TABLET | Refills: 0 | Status: SHIPPED | OUTPATIENT
Start: 2020-11-02 | End: 2020-11-07

## 2020-11-02 RX ORDER — DOXYCYCLINE HYCLATE 100 MG/1
100 CAPSULE ORAL 2 TIMES DAILY
Qty: 20 CAPSULE | Refills: 0 | Status: SHIPPED | OUTPATIENT
Start: 2020-11-02 | End: 2020-11-12

## 2020-11-02 RX ORDER — BENZONATATE 100 MG/1
CAPSULE ORAL
Qty: 60 CAPSULE | Refills: 0 | Status: SHIPPED | OUTPATIENT
Start: 2020-11-02 | End: 2020-11-12

## 2020-11-02 NOTE — PROGRESS NOTES
Chief Complaint   Patient presents with    Cough    Wheezing       History obtained from the patient. SUBJECTIVE:  Leah Trevizo is a 48 y.o. male that presents today for       -URI type sxs:   sxs started last wk  Seen in ER on Wednesday  Had cxr and covid swab  cxr neg  covig neg  Sent home with prn alb, mucinx, zyrtec and flonase  Not feeling much better  No fever   + cough  On and off wheezing  Mild SOB  Alb does help    Fever - No  Runny nose or congestion -  Yes   Cough -  Yes  Sore throat -  Yes  Headache, fatigue, joint pains, muscle aches -  No  Double Sickening - Yes  Shortness of breath/Wheezing? - as above  Nausea/Vomiting/Diarrhea?   No  Sick contacts - No  Maxillary Tooth Pain -  No  Treatment tried and response - as above      Age/Gender Health Maintenance    Lipid -   Lab Results   Component Value Date    CHOL 148 01/15/2020     Lab Results   Component Value Date    TRIG 60 01/15/2020     Lab Results   Component Value Date    HDL 57 01/15/2020     Lab Results   Component Value Date    LDLCALC 79 01/15/2020     No results found for: LABVLDL, VLDL  No results found for: CHOLHDLRATIO      DM Screen -   Lab Results   Component Value Date    GLUCOSE 110 10/29/2020     No results found for: LABA1C    Colon Cancer Screening - NEG FEB 2013, repeat 10 years  Lung Cancer Screening (Age 54 to [de-identified] with 30 pack year hx, current smoker or quit within past 13 years) - age 54 if meets criteria    Tetanus - declines July 2020  Influenza Vaccine - Candidate FALL 2020  Pneumonia Vaccine - declines July 2020  Zoster - declines July 2020     PSA testing discussion - age 54  AAA Screening - age 72 if smoked    Falls screening - n/a      Current Outpatient Medications   Medication Sig Dispense Refill    doxycycline hyclate (VIBRAMYCIN) 100 MG capsule Take 1 capsule by mouth 2 times daily for 10 days 20 capsule 0    benzonatate (TESSALON PERLES) 100 MG capsule Take 1 to 2 tablets by mouth every 8 hours as needed for cough. 60 capsule 0    predniSONE (DELTASONE) 20 MG tablet Take 2 tablets by mouth daily for 5 days 10 tablet 0    cetirizine (ZYRTEC) 10 MG tablet Take 1 tablet by mouth daily 30 tablet 0    fluticasone (FLONASE) 50 MCG/ACT nasal spray 1 spray by Each Nostril route daily 1 Bottle 0    albuterol sulfate HFA (VENTOLIN HFA) 108 (90 Base) MCG/ACT inhaler Inhale 2 puffs into the lungs 4 times daily as needed for Wheezing 1 Inhaler 0    guaiFENesin (MUCINEX) 600 MG extended release tablet Take 1 tablet by mouth 2 times daily for 15 days 30 tablet 0    ondansetron (ZOFRAN ODT) 4 MG disintegrating tablet Place 1 tablet under the tongue every 8 hours as needed for Nausea or Vomiting 20 tablet 0    lisinopril (PRINIVIL;ZESTRIL) 20 MG tablet Take 1 tablet by mouth daily 90 tablet 3    nebivolol (BYSTOLIC) 2.5 MG tablet Take 1 tablet by mouth daily 90 tablet 3    atorvastatin (LIPITOR) 20 MG tablet Take 1 tablet by mouth daily 90 tablet 3    amLODIPine (NORVASC) 10 MG tablet Take 1 tablet by mouth daily      aspirin EC 81 MG EC tablet Take 1 tablet by mouth daily 90 tablet 1     No current facility-administered medications for this visit. Orders Placed This Encounter   Medications    doxycycline hyclate (VIBRAMYCIN) 100 MG capsule     Sig: Take 1 capsule by mouth 2 times daily for 10 days     Dispense:  20 capsule     Refill:  0    benzonatate (TESSALON PERLES) 100 MG capsule     Sig: Take 1 to 2 tablets by mouth every 8 hours as needed for cough. Dispense:  60 capsule     Refill:  0    predniSONE (DELTASONE) 20 MG tablet     Sig: Take 2 tablets by mouth daily for 5 days     Dispense:  10 tablet     Refill:  0         All medications reviewed and reconciled, including OTC and herbal medications. Updated list given to patient.        Patient Active Problem List    Diagnosis Date Noted    Dysfunction of both eustachian tubes 07/09/2020    Right lateral epicondylitis 07/09/2020    ASHD (arteriosclerotic heart disease)    Floydene Ada Dyslipidemia     Former smoker     Heart failure with preserved ejection fraction (HCC)      Hx of prior LV dysfunction      Hypertension, essential     Mitral regurgitation     Nicotine dependence, chewing tobacco, uncomplicated     Obesity (BMI 30-39. 9)     S/P coronary artery stent placement     S/P mitral valve repair     Coronary artery disease involving native coronary artery of native heart without angina pectoris 80/97/9641    Diastolic murmur 95/92/2493    Family history of early CAD 12/07/2015    LVH (left ventricular hypertrophy) 12/07/2015       Past Medical History:   Diagnosis Date    ASHD (arteriosclerotic heart disease)    Floydene Ada Dyslipidemia     Former smoker     GERD (gastroesophageal reflux disease)     Heart attack ([address]     Heart failure with preserved ejection fraction (HCC)     Hx of prior LV dysfunction    Hypertension, essential     Mitral regurgitation     Nicotine dependence, chewing tobacco, uncomplicated     Obesity (BMI 30-39. 9)     S/P coronary artery stent placement     S/P mitral valve repair          Past Surgical History:   Procedure Laterality Date    APPENDECTOMY      CARDIAC CATHETERIZATION      CORONARY ANGIOPLASTY      INGUINAL HERNIA REPAIR      INGUINAL HERNIA REPAIR Left 05/13/2016    MITRAL VALVE REPAIR      ROTATOR CUFF REPAIR Right     right and left    VENTRAL HERNIA REPAIR  05/13/2016         No Known Allergies      Social History     Tobacco Use    Smoking status: Never Smoker    Smokeless tobacco: Former User     Types: Chew   Substance Use Topics    Alcohol use: Not Currently         Family History   Problem Relation Age of Onset    No Known Problems Mother     No Known Problems Father     Colon Cancer Neg Hx     Prostate Cancer Neg Hx          I have reviewed the patient's past medical history, past surgical history, allergies, medications, social and family history and I have made updates where appropriate. Review of Systems  Positive responses are highlighted in bold    Constitutional:  Fever, Chills, Night Sweats, Fatigue, Unexpected changes in weight  HENT:  Ear pain, Tinnitus, Nosebleeds, Trouble swallowing, Hearing loss, Sore throat  Cardiovascular:  Chest Pain, Palpitations, Orthopnea, Paroxysmal Nocturnal Dyspnea  Respiratory:  Cough, Wheezing, Shortness of breath, Chest tightness, Apnea  Gastrointestinal:  Nausea, Vomiting, Diarrhea, Constipation, Heartburn, Blood in stool  Genitourinary:  Difficulty or painful urination, Flank pain, Change in frequency, Urgency  Skin:  Color change, Rash, Itching, Wound  Musculoskeletal:  Joint pain, Back pain, Gait problems, Joint swelling, Myalgias  Neurological:  Dizziness, Headaches, Presyncope, Numbness, Seizures, Tremors  Endocrine:  Heat Intolerance, Cold Intolerance, Polydipsia, Polyphagia, Polyuria      PHYSICAL EXAM:  Not all vitals able to be obtained, video visit  Patient-Reported Vitals 11/2/2020   Patient-Reported Pulse 80   Patient-Reported Temperature 98      Vitals:    11/02/20 0929   Resp: 14     Pain Score: 2(Throat)    General Appearance: A&O x 3, No acute distress,well developed and well- nourished  Head: normocephalic and atraumatic  Eyes: pupils equal, round, and reactive to light, extraocular eye movements intact, conjunctivae and eye lids without erythema  ENT: External ears w/o redness, external nares without redness or discharge. Hearing is intact. Lips are w/o lesion. Teeth are in good repair. Pulmonary/Chest: normal respiratory effort. Normal respiratory rate. No respiratory distress. No audible wheezing over audio/video feed  Skin: warm and dry, no rash or erythema to visible areas. Psych: Affect appropriate. Mood euthymic. Thought process is normal without evidence of depression or psychosis. Good insight and appropriate interaction. Cognition and memory appear to be intact.       Component      Latest Ref Rng & Units 10/29/2020           1:00 AM   SARS-CoV-2      Not Detected Not Detected       Narrative    Chest X-ray, 1 View         COMPARISON: None         FINDINGS:    No consolidation. No pleural effusion. No pneumothorax. No cardiomegaly. No acute fracture.              Impression    No acute findings.         This document has been electronically signed by: Brayan Reveles MD on    10/29/2020 01:01 AM             ASSESSMENT & PLAN  1. Bronchitis    Hx and limited exam reassuring  Appropriate for continued OP management  Neg cxr and covid   Just not improving  Will add doxy, pred burst and tessalon  F/u if no better  Reviewed ER precautions, pt understands. - doxycycline hyclate (VIBRAMYCIN) 100 MG capsule; Take 1 capsule by mouth 2 times daily for 10 days  Dispense: 20 capsule; Refill: 0  - benzonatate (TESSALON PERLES) 100 MG capsule; Take 1 to 2 tablets by mouth every 8 hours as needed for cough. Dispense: 60 capsule; Refill: 0  - predniSONE (DELTASONE) 20 MG tablet; Take 2 tablets by mouth daily for 5 days  Dispense: 10 tablet; Refill: 0      DISPOSITION    Return if symptoms worsen or fail to improve. Talia Porter released without restrictions. Future Appointments   Date Time Provider Moiz Hernández   11/6/2020  4:30 PM STR MRI RM1 STRZ MRI STR Radiolog   1/22/2021  9:15 AM Lul Dash MD [facility] [address] [facility]     PATIENT COUNSELING    Barriers to learning and self management: none    Discussed use, benefit, and side effects of prescribed medications. Barriers to medication compliance addressed. All patient questions answered. Pt voiced understanding.        Electronically signed by Juliana Bailey DO on 11/2/2020 at 9:33 AM

## 2020-11-03 PROBLEM — I25.10 ASHD (ARTERIOSCLEROTIC HEART DISEASE): Status: RESOLVED | Noted: 2020-11-03 | Resolved: 2020-11-03

## 2020-11-06 ENCOUNTER — HOSPITAL ENCOUNTER (OUTPATIENT)
Dept: MRI IMAGING | Age: 55
Discharge: HOME | End: 2020-11-06
Payer: COMMERCIAL

## 2020-11-06 PROCEDURE — 73721 MRI JNT OF LWR EXTRE W/O DYE: CPT

## 2020-11-09 ENCOUNTER — TELEPHONE (OUTPATIENT)
Dept: FAMILY MEDICINE CLINIC | Age: 55
End: 2020-11-09

## 2020-11-09 PROBLEM — S83.512A LEFT ANTERIOR CRUCIATE LIGAMENT TEAR: Status: ACTIVE | Noted: 2020-11-09

## 2020-11-09 PROBLEM — S83.282A TEAR OF LATERAL MENISCUS OF LEFT KNEE, CURRENT: Status: ACTIVE | Noted: 2020-11-09

## 2020-11-09 NOTE — TELEPHONE ENCOUNTER
PT went to work today after being treated by dr Jana Su, but Pts work is requiring him to have a dr's note.

## 2020-11-09 NOTE — TELEPHONE ENCOUNTER
----- Message from Leona Ramesh DO sent at 11/9/2020 11:29 AM EST -----  Please let pt know that MRI confirms ACL tear also shows probable lateral meniscal tear. Recommend we get him setup with ortho. I've placed a consult, it'll need printed out. Let me know if questions, thanks!

## 2020-11-09 NOTE — LETTER
[address] [facility]  [address] [facility] [place] [address]  Phone: [phone]  Fax: [address] , [place]      2020    Patient: Belinda Williamson   YOB: 1965         To Whom it May Concern:    Jetty Apgar is my patient. He should be excused from work from 10/29/2020 though 2020. He may return to work on 2020. If you have any questions or concerns, please don't hesitate to call.       Sincerely,         Liza Knight DO

## 2020-11-09 NOTE — TELEPHONE ENCOUNTER
Pt states the letter needs to say off from 10/29/20-11/6/20  Return to work 11/7/20  Fax to [employer]  [phone]

## 2020-11-12 ENCOUNTER — TELEPHONE (OUTPATIENT)
Dept: FAMILY MEDICINE CLINIC | Age: 55
End: 2020-11-12

## 2020-11-12 NOTE — TELEPHONE ENCOUNTER
Pt was notified our ofc has received his disability paperwork & he will need to come to the ofc to sign the paperwork prior to our completing the forms. Pt verbalized understanding & the paperwork was placed at the  to be signed.

## 2020-11-17 RX ORDER — FLUTICASONE PROPIONATE 50 MCG
1 SPRAY, SUSPENSION (ML) NASAL DAILY
Qty: 1 BOTTLE | Refills: 3 | Status: SHIPPED | OUTPATIENT
Start: 2020-11-17 | End: 2021-06-29

## 2021-01-22 ENCOUNTER — OFFICE VISIT (OUTPATIENT)
Dept: CARDIOLOGY CLINIC | Age: 56
End: 2021-01-22
Payer: COMMERCIAL

## 2021-01-22 VITALS
DIASTOLIC BLOOD PRESSURE: 82 MMHG | SYSTOLIC BLOOD PRESSURE: 123 MMHG | BODY MASS INDEX: 30.06 KG/M2 | HEIGHT: 70 IN | WEIGHT: 210 LBS | HEART RATE: 50 BPM

## 2021-01-22 DIAGNOSIS — Z98.890 S/P CARDIAC CATH: ICD-10-CM

## 2021-01-22 DIAGNOSIS — I25.10 CORONARY ARTERY DISEASE INVOLVING NATIVE CORONARY ARTERY OF NATIVE HEART WITHOUT ANGINA PECTORIS: ICD-10-CM

## 2021-01-22 DIAGNOSIS — R06.09 DOE (DYSPNEA ON EXERTION): ICD-10-CM

## 2021-01-22 DIAGNOSIS — E78.5 DYSLIPIDEMIA: Chronic | ICD-10-CM

## 2021-01-22 DIAGNOSIS — Z01.818 PRE-OP EVALUATION: Primary | ICD-10-CM

## 2021-01-22 DIAGNOSIS — Z95.5 S/P CORONARY ARTERY STENT PLACEMENT: Chronic | ICD-10-CM

## 2021-01-22 DIAGNOSIS — I10 HYPERTENSION, ESSENTIAL: ICD-10-CM

## 2021-01-22 DIAGNOSIS — Z98.890 S/P MITRAL VALVE REPAIR: Chronic | ICD-10-CM

## 2021-01-22 PROCEDURE — 99213 OFFICE O/P EST LOW 20 MIN: CPT | Performed by: INTERNAL MEDICINE

## 2021-01-22 PROCEDURE — 93000 ELECTROCARDIOGRAM COMPLETE: CPT | Performed by: INTERNAL MEDICINE

## 2021-01-22 NOTE — PROGRESS NOTES
Chief Complaint   Patient presents with    6 Month Follow-Up    Cardiac Clearance     Left Knee scope. Poss. ACL 02.02.21 - Luz Escamilla   originally  patient moved from [facility]  ? ? ? Hx of Mitral valve repair/ clip for MR 5 yrs back- transcathater- per pat ( not confirmed)  ? ? ? Hx of coronary stent 2 5 yrs back per pat ( Not confirmed from record)  Record from outside  Reviewed and the above claim of the pat could be verified      Pre op evaluation    Cardiac Clearance- left knee scope, poss ACL 02.02.21-  Dr. Suzanna Us  Hx of fall     Denied cp, sob, palpitations, dizziness or edema    Nonsmoker    FHX  Mother had valve procedure, had stent at 72    Patient Active Problem List   Diagnosis    Coronary artery disease involving native coronary artery of native heart without angina pectoris    Diastolic murmur    Family history of early CAD    LVH (left ventricular hypertrophy)    Dyslipidemia    Former smoker    Heart failure with preserved ejection fraction ([facility] [address]    Hypertension, essential    Mitral regurgitation    Nicotine dependence, chewing tobacco, uncomplicated    Obesity (BMI 30-39. 9)    S/P coronary artery stent placement    S/P mitral valve repair    Dysfunction of both eustachian tubes    Right lateral epicondylitis    Left anterior cruciate ligament tear    Tear of lateral meniscus of left knee, current    Pre-op evaluation for knee surgery    S/P cardiac cath 2015 mod LAD lesion 30 to 50%- med RX    BAILEY (dyspnea on exertion)       Past Surgical History:   Procedure Laterality Date    APPENDECTOMY      CARDIAC CATHETERIZATION      CORONARY ANGIOPLASTY      INGUINAL HERNIA REPAIR      INGUINAL HERNIA REPAIR Left 05/13/2016    MITRAL VALVE REPAIR      ROTATOR CUFF REPAIR Right     right and left    VENTRAL HERNIA REPAIR  05/13/2016       No Known Allergies     Family History   Problem Relation Age of Onset    No Known Problems Mother     No Known Problems Father     Colon Cancer Neg Hx     Prostate Cancer Neg Hx         Social History     Socioeconomic History    Marital status:      Spouse name: Not on file    Number of children: Not on file    Years of education: Not on file    Highest education level: Not on file   Occupational History    Not on file   Social Needs    Financial resource strain: Not on file    Food insecurity     Worry: Not on file     Inability: Not on file    Transportation needs     Medical: Not on file     Non-medical: Not on file   Tobacco Use    Smoking status: Never Smoker    Smokeless tobacco: Former User     Types: Chew   Substance and Sexual Activity    Alcohol use: Not Currently    Drug use: Never    Sexual activity: Not on file   Lifestyle    Physical activity     Days per week: Not on file     Minutes per session: Not on file    Stress: Not on file   Relationships    Social connections     Talks on phone: Not on file     Gets together: Not on file     Attends Episcopalian service: Not on file     Active member of club or organization: Not on file     Attends meetings of clubs or organizations: Not on file     Relationship status: Not on file    Intimate partner violence     Fear of current or ex partner: Not on file     Emotionally abused: Not on file     Physically abused: Not on file     Forced sexual activity: Not on file   Other Topics Concern    Not on file   Social History Narrative    Not on file       Current Outpatient Medications   Medication Sig Dispense Refill    fluticasone (FLONASE) 50 MCG/ACT nasal spray 1 spray by Each Nostril route daily 1 Bottle 3    albuterol sulfate HFA (VENTOLIN HFA) 108 (90 Base) MCG/ACT inhaler Inhale 2 puffs into the lungs 4 times daily as needed for Wheezing 1 Inhaler 0    lisinopril (PRINIVIL;ZESTRIL) 20 MG tablet Take 1 tablet by mouth daily 90 tablet 3    nebivolol (BYSTOLIC) 2.5 MG tablet Take 1 tablet by mouth daily 90 tablet 3    atorvastatin (LIPITOR) 20 MG tablet Take 1 tablet by mouth daily 90 tablet 3    amLODIPine (NORVASC) 10 MG tablet Take 1 tablet by mouth daily      aspirin EC 81 MG EC tablet Take 1 tablet by mouth daily 90 tablet 1    ondansetron (ZOFRAN ODT) 4 MG disintegrating tablet Place 1 tablet under the tongue every 8 hours as needed for Nausea or Vomiting (Patient not taking: Reported on 1/22/2021) 20 tablet 0     No current facility-administered medications for this visit. Review of Systems -     General ROS: negative  Psychological ROS: negative  Hematological and Lymphatic ROS: No history of blood clots or bleeding disorder. Respiratory ROS: no cough,  or wheezing, the rest see HPI  Cardiovascular ROS: See HPI  Gastrointestinal ROS: negative  Genito-Urinary ROS: no dysuria, trouble voiding, or hematuria  Musculoskeletal ROS: negative  Neurological ROS: no TIA or stroke symptoms  Dermatological ROS: negative      Blood pressure 123/82, pulse 50, height 5' 10\" (1.778 m), weight 210 lb (95.3 kg).         Physical Examination:    General appearance - alert, well appearing, and in no distress  HEENT- Pink conjunctiva  , Non-icteri sclera,PERRLA  Mental status - alert, oriented to person, place, and time  Neck - supple, no significant adenopathy, no JVD, or carotid bruits  Chest - clear to auscultation, no wheezes, rales or rhonchi, symmetric air entry  Heart - normal rate, regular rhythm, normal S1, S2, no murmurs, rubs, clicks or gallops  Abdomen - soft, nontender, nondistended, no masses or organomegaly  DAVI- no CVA or flank tenderness, no suprapubic tenderness  Neurological - alert, oriented, normal speech, no focal findings or movement disorder noted  Musculoskeletal/limbs - no joint tenderness, deformity or swelling   - peripheral pulses normal, no pedal edema, no clubbing or cyanosis  Skin - normal coloration and turgor, no rashes, no suspicious skin lesions noted  Psych- appropriate mood and affect    Lab  No results for input(s): CKTOTAL, CKMB, CKMBINDEX, TROPONINI in the last 72 hours.   CBC:   Lab Results   Component Value Date    WBC 7.5 10/29/2020    RBC 4.74 10/29/2020    HGB 15.1 10/29/2020    HCT 45.4 10/29/2020    MCV 95.8 10/29/2020    MCH 31.9 10/29/2020    MCHC 33.3 10/29/2020     10/29/2020    MPV 8.9 10/29/2020     BMP:    Lab Results   Component Value Date     10/29/2020    K 4.4 10/29/2020     10/29/2020    CO2 26 10/29/2020    BUN 17 10/29/2020    LABALBU 4.5 01/15/2020    CREATININE 0.9 10/29/2020    CALCIUM 9.1 10/29/2020    LABGLOM 88 10/29/2020    GLUCOSE 110 10/29/2020     Hepatic Function Panel:    Lab Results   Component Value Date    ALKPHOS 63 01/15/2020    ALT 27 01/15/2020    AST 26 01/15/2020    PROT 7.2 01/15/2020    BILITOT 0.7 01/15/2020    BILIDIR <0.2 01/15/2020    LABALBU 4.5 01/15/2020     Magnesium:    Lab Results   Component Value Date    MG 2.1 10/29/2020     Warfarin PT/INR:  No components found for: PTPATWAR, PTINRWAR  HgBA1c:  No results found for: LABA1C  FLP:    Lab Results   Component Value Date    TRIG 60 01/15/2020    HDL 57 01/15/2020    LDLCALC 79 01/15/2020     TSH:  No results found for: TSH      Reviewed the record from outside Hospital  [facility] [address] [address] [address] [address]  ]EF 65%  LAD : 30-50%  LCx/RCA: Normal  RCA could not be accessed radially- had to be accessed through femoral access    Electronically signed by Suzie Bolton MD at 12/30/2015 11:37 AM EST      Mid LAD lesion 50% stenosed.       Final Impression:        1.  Coronary artery disease as described above    2.  Mild to moderate stenosis of the mid left anterior descending artery.    3.  The left circumflex Artery and the right coronary artery appear     angiographically normal.  The right coronary artery has a posterior     takeoff.    4.  Left ventricular filling pressures are normal    5.  Left ventricular systolic function is normal           Plan:        1.  Adding calcium channel blockers for possible spasm.    2.  Aspirin and statin therapy is imperative.      Myron Bender MD, [facility]  Echo Dec 2015  CONCLUSIONS     Left ventricular wall thickness mildly increased. Left ventricular cavity size normal.  Mild global hypokinesis. LVEF   45-50%.     Normal right ventricular size.   Mildly reduced right ventricular global systolic function.     Mild-to-moderate mitral regurgitation    ekg  8/29/19  Sinus  Bradycardia   WITHIN NORMAL LIMITS    Conclusions      Summary   Normal left ventricle size and systolic function. Ejection fraction was   estimated at 60 %. There were no regional left ventricular wall motion   abnormalities and wall thickness was within normal limits. The left atrium is Mildly dilated. Signature      ----------------------------------------------------------------   Electronically signed by Uriel Kang MD (Interpreting   physician) on 09/05/2019 at 05:39 PM      ekg 1/22/21  Sinus  Bradycardia   WITHIN NORMAL LIMITS      Assessment    ? ? ? Hx valve procedure mitral valve ( could not be verified from the record)     Diagnosis Orders   1. Pre-op evaluation for knee surgery  Stress test, lexiscan    Hepatic Function Panel    Lipid Panel   2. Coronary artery disease involving native coronary artery of native heart without angina pectoris  EKG 12 lead    Stress test, lexiscan    Hepatic Function Panel    Lipid Panel   3. Hypertension, essential  EKG 12 lead    Stress test, lexiscan    Hepatic Function Panel    Lipid Panel   4. Dyslipidemia  Stress test, lexiscan    Hepatic Function Panel    Lipid Panel   5. S/P coronary artery stent placement  Stress test, lexiscan    Hepatic Function Panel    Lipid Panel   6. S/P mitral valve repair  Stress test, lexiscan    Hepatic Function Panel    Lipid Panel   7. BAILEY (dyspnea on exertion)  Stress test, lexiscan    Hepatic Function Panel    Lipid Panel   8.  S/P cardiac cath 2015 mod LAD lesion 30 to 50%- med RX  Stress test, lexiscan    Hepatic Function Panel    Lipid Panel         Plan   The most current meds and labs reviewed    Continue the current treatment and with constant vigilance to changes in symptoms and also any potential side effects. Return for care or seek medical attention immediately if symptoms got worse and/or develop new symptoms. Hypertension, on medical treatment. Seems to be under good control. Patient is compliant with medical treatment. Hyperlipidemia: on statins, followed periodically. Patient need periodic lipid and liver profile. Coronary artery disease, seems to be stable. Denies angina or change in breathing pattern  Never had any intervention from the record from cardiac stand point  But moderate nonobstructive CAD noted  Cont asa and statins  Asa 81 mg po qd  Metoprolol stopped In [place] due to ED per pat  Now on Bystolic    Echo- WNL   report from  [facility] reviewed   NO stent noted   No Mitral valve surgery Noted from records    Pre op eval  Hx of CAD  BAILEY  lexisc nuc  If the test okay may proceed with low to mod risk    Lipid panel and liver function test before next appointment    D/w the pat the plan of care datila    patient is advised to exercise 30 min s a day three times a week and about weight loss ,balance diet and     More fruits and vegetables .     RTC in 6 months    Laurent Susan [facility]

## 2021-01-28 ENCOUNTER — HOSPITAL ENCOUNTER (OUTPATIENT)
Dept: NON INVASIVE DIAGNOSTICS | Age: 56
Discharge: HOME | End: 2021-01-28
Payer: COMMERCIAL

## 2021-01-28 VITALS — HEIGHT: 70 IN | WEIGHT: 210 LBS | BODY MASS INDEX: 30.06 KG/M2

## 2021-01-28 DIAGNOSIS — Z98.890 S/P CARDIAC CATH: ICD-10-CM

## 2021-01-28 DIAGNOSIS — Z01.818 PRE-OP EVALUATION: ICD-10-CM

## 2021-01-28 DIAGNOSIS — R06.09 DOE (DYSPNEA ON EXERTION): ICD-10-CM

## 2021-01-28 DIAGNOSIS — I25.10 CORONARY ARTERY DISEASE INVOLVING NATIVE CORONARY ARTERY OF NATIVE HEART WITHOUT ANGINA PECTORIS: ICD-10-CM

## 2021-01-28 DIAGNOSIS — I10 HYPERTENSION, ESSENTIAL: ICD-10-CM

## 2021-01-28 DIAGNOSIS — Z98.890 S/P MITRAL VALVE REPAIR: Chronic | ICD-10-CM

## 2021-01-28 DIAGNOSIS — Z95.5 S/P CORONARY ARTERY STENT PLACEMENT: Chronic | ICD-10-CM

## 2021-01-28 DIAGNOSIS — E78.5 DYSLIPIDEMIA: Chronic | ICD-10-CM

## 2021-01-28 PROCEDURE — 93017 CV STRESS TEST TRACING ONLY: CPT | Performed by: INTERNAL MEDICINE

## 2021-01-28 PROCEDURE — 78452 HT MUSCLE IMAGE SPECT MULT: CPT

## 2021-01-28 PROCEDURE — 3430000000 HC RX DIAGNOSTIC RADIOPHARMACEUTICAL: Performed by: INTERNAL MEDICINE

## 2021-01-28 PROCEDURE — 6360000002 HC RX W HCPCS

## 2021-01-28 PROCEDURE — A9500 TC99M SESTAMIBI: HCPCS | Performed by: INTERNAL MEDICINE

## 2021-01-28 RX ADMIN — Medication 32 MILLICURIE: at 10:35

## 2021-01-28 RX ADMIN — Medication 9.1 MILLICURIE: at 09:40

## 2021-02-09 ENCOUNTER — HOSPITAL ENCOUNTER (OUTPATIENT)
Age: 56
Discharge: HOME | End: 2021-02-09
Payer: COMMERCIAL

## 2021-02-09 DIAGNOSIS — I10 HYPERTENSION, ESSENTIAL: ICD-10-CM

## 2021-02-09 DIAGNOSIS — Z98.890 S/P CARDIAC CATH: ICD-10-CM

## 2021-02-09 DIAGNOSIS — I25.10 CORONARY ARTERY DISEASE INVOLVING NATIVE CORONARY ARTERY OF NATIVE HEART WITHOUT ANGINA PECTORIS: ICD-10-CM

## 2021-02-09 DIAGNOSIS — R06.09 DOE (DYSPNEA ON EXERTION): ICD-10-CM

## 2021-02-09 DIAGNOSIS — E78.5 DYSLIPIDEMIA: Chronic | ICD-10-CM

## 2021-02-09 DIAGNOSIS — Z95.5 S/P CORONARY ARTERY STENT PLACEMENT: Chronic | ICD-10-CM

## 2021-02-09 DIAGNOSIS — Z98.890 S/P MITRAL VALVE REPAIR: Chronic | ICD-10-CM

## 2021-02-09 DIAGNOSIS — Z01.818 PRE-OP EVALUATION: ICD-10-CM

## 2021-02-09 LAB
ALBUMIN SERPL-MCNC: 4 G/DL (ref 3.5–5.1)
ALP BLD-CCNC: 72 U/L (ref 38–126)
ALT SERPL-CCNC: 30 U/L (ref 11–66)
AST SERPL-CCNC: 20 U/L (ref 5–40)
BILIRUB SERPL-MCNC: 0.6 MG/DL (ref 0.3–1.2)
BILIRUBIN DIRECT: < 0.2 MG/DL (ref 0–0.3)
CHOLESTEROL, TOTAL: 148 MG/DL (ref 100–199)
HDLC SERPL-MCNC: 55 MG/DL
LDL CHOLESTEROL CALCULATED: 76 MG/DL
TOTAL PROTEIN: 6.6 G/DL (ref 6.1–8)
TRIGL SERPL-MCNC: 87 MG/DL (ref 0–199)

## 2021-02-09 PROCEDURE — 80061 LIPID PANEL: CPT

## 2021-02-09 PROCEDURE — 80076 HEPATIC FUNCTION PANEL: CPT

## 2021-02-09 PROCEDURE — 36415 COLL VENOUS BLD VENIPUNCTURE: CPT

## 2021-02-21 PROBLEM — Z01.818 PRE-OP EVALUATION: Status: RESOLVED | Noted: 2021-01-22 | Resolved: 2021-02-21

## 2021-06-29 RX ORDER — FLUTICASONE PROPIONATE 50 MCG
SPRAY, SUSPENSION (ML) NASAL
Qty: 16 G | Refills: 5 | Status: SHIPPED | OUTPATIENT
Start: 2021-06-29 | End: 2022-08-01

## 2021-06-29 NOTE — TELEPHONE ENCOUNTER
Recent Visits  Date Type Provider Dept   10/06/20 Office Visit Alyson Sherman DO [facility]   07/09/20 Office Visit Alyson Sherman, DO [facility]   Showing recent visits within past 540 days with a meds authorizing provider and meeting all other requirements  Future Appointments  No visits were found meeting these conditions.   Showing future appointments within next 150 days with a meds authorizing provider and meeting all other requirements      Future Appointments   Date Time Provider Moiz Hernández   7/23/2021  9:15 AM [facility]fraín [facility]

## 2021-07-15 ENCOUNTER — APPOINTMENT (OUTPATIENT)
Dept: GENERAL RADIOLOGY | Age: 56
End: 2021-07-15
Payer: COMMERCIAL

## 2021-07-15 ENCOUNTER — HOSPITAL ENCOUNTER (EMERGENCY)
Age: 56
Discharge: HOME | End: 2021-07-15
Payer: COMMERCIAL

## 2021-07-15 VITALS
WEIGHT: 210 LBS | OXYGEN SATURATION: 96 % | DIASTOLIC BLOOD PRESSURE: 95 MMHG | BODY MASS INDEX: 30.13 KG/M2 | SYSTOLIC BLOOD PRESSURE: 154 MMHG | TEMPERATURE: 98.1 F | RESPIRATION RATE: 16 BRPM | HEART RATE: 89 BPM

## 2021-07-15 DIAGNOSIS — S83.411A SPRAIN OF MEDIAL COLLATERAL LIGAMENT OF RIGHT KNEE, INITIAL ENCOUNTER: Primary | ICD-10-CM

## 2021-07-15 PROCEDURE — 73564 X-RAY EXAM KNEE 4 OR MORE: CPT

## 2021-07-15 PROCEDURE — 99282 EMERGENCY DEPT VISIT SF MDM: CPT

## 2021-07-15 PROCEDURE — 6370000000 HC RX 637 (ALT 250 FOR IP): Performed by: NURSE PRACTITIONER

## 2021-07-15 RX ORDER — HYDROCODONE BITARTRATE AND ACETAMINOPHEN 5; 325 MG/1; MG/1
1 TABLET ORAL ONCE
Status: COMPLETED | OUTPATIENT
Start: 2021-07-15 | End: 2021-07-15

## 2021-07-15 RX ORDER — HYDROCODONE BITARTRATE AND ACETAMINOPHEN 5; 325 MG/1; MG/1
1 TABLET ORAL EVERY 6 HOURS PRN
Qty: 10 TABLET | Refills: 0 | Status: SHIPPED | OUTPATIENT
Start: 2021-07-15 | End: 2021-07-18

## 2021-07-15 RX ORDER — NAPROXEN 500 MG/1
500 TABLET ORAL 2 TIMES DAILY WITH MEALS
Qty: 24 TABLET | Refills: 0 | Status: SHIPPED | OUTPATIENT
Start: 2021-07-15 | End: 2021-08-26

## 2021-07-15 RX ADMIN — HYDROCODONE BITARTRATE AND ACETAMINOPHEN 1 TABLET: 5; 325 TABLET ORAL at 18:58

## 2021-07-15 ASSESSMENT — PAIN DESCRIPTION - LOCATION: LOCATION: KNEE

## 2021-07-15 ASSESSMENT — PAIN DESCRIPTION - PAIN TYPE: TYPE: ACUTE PAIN

## 2021-07-15 ASSESSMENT — PAIN DESCRIPTION - ONSET: ONSET: SUDDEN

## 2021-07-15 ASSESSMENT — PAIN DESCRIPTION - PROGRESSION: CLINICAL_PROGRESSION: GRADUALLY WORSENING

## 2021-07-15 ASSESSMENT — PAIN SCALES - GENERAL
PAINLEVEL_OUTOF10: 8
PAINLEVEL_OUTOF10: 8

## 2021-07-15 ASSESSMENT — PAIN DESCRIPTION - DESCRIPTORS: DESCRIPTORS: BURNING;SHOOTING

## 2021-07-15 ASSESSMENT — PAIN DESCRIPTION - FREQUENCY: FREQUENCY: CONTINUOUS

## 2021-07-15 NOTE — ED NOTES
Pt presents to the ER for knee pian. Pt states that he was golfing and felt his knee \"go out\". Pt states that he cannot put on any weight on his leg.      Portillo Zuñiga  07/15/21 9440

## 2021-07-17 ASSESSMENT — ENCOUNTER SYMPTOMS
NAUSEA: 0
RHINORRHEA: 0
EYE REDNESS: 0
ABDOMINAL PAIN: 0
VOMITING: 0
BACK PAIN: 0
COUGH: 0
CHEST TIGHTNESS: 0

## 2021-07-17 NOTE — ED PROVIDER NOTES
[facility] Emergency Department    CHIEF COMPLAINT       Chief Complaint   Patient presents with    Knee Injury       Nurses Notes reviewed and I agree except as noted in the HPI. HISTORY OF PRESENT ILLNESS    Belinda Williamson cal 55 y.o. male who presents to the ED for evaluation of a knee injury. Patient was golfing and went to swing and felt a pop. He states he cannot put any weight on the knee and cannot fully extend or flex. The patient is in position of comfort at 90 degrees. HPI was provided by the patient    REVIEW OF SYSTEMS     Review of Systems   Constitutional: Negative for chills, fatigue and fever. HENT: Negative for congestion, ear discharge, ear pain, postnasal drip and rhinorrhea. Eyes: Negative for redness. Respiratory: Negative for cough and chest tightness. Cardiovascular: Negative for chest pain and leg swelling. Gastrointestinal: Negative for abdominal pain, nausea and vomiting. Genitourinary: Negative for difficulty urinating, dysuria, enuresis, flank pain and hematuria. Musculoskeletal: Positive for arthralgias, gait problem and joint swelling. Negative for back pain. Skin: Negative for rash. Neurological: Negative for dizziness, light-headedness, numbness and headaches. Psychiatric/Behavioral: Negative for agitation, behavioral problems and confusion. All other systems negative except as noted. PAST MEDICAL HISTORY     Past Medical History:   Diagnosis Date    ASHD (arteriosclerotic heart disease)    [facility] Dyslipidemia     Former smoker     GERD (gastroesophageal reflux disease)     Heart attack ([facility] [address]     Heart failure with preserved ejection fraction (HCC)     Hx of prior LV dysfunction    Hypertension, essential     Mitral regurgitation     Nicotine dependence, chewing tobacco, uncomplicated     Obesity (BMI 30-39. 9)     S/P coronary artery stent placement     S/P mitral valve repair        SURGICALHISTORY      has a past surgical history that includes Appendectomy; Coronary angioplasty; Mitral valve repair; Cardiac catheterization; Inguinal hernia repair; ventral hernia repair (2016); Inguinal hernia repair (Left, 2016); and Rotator cuff repair (Right). CURRENT MEDICATIONS       Discharge Medication List as of 7/15/2021  7:07 PM      CONTINUE these medications which have NOT CHANGED    Details   fluticasone (FLONASE) 50 MCG/ACT nasal spray USE 1 SPRAY IN EACH NOSTRIL DAILY, Disp-16 g, R-5Normal      albuterol sulfate HFA (VENTOLIN HFA) 108 (90 Base) MCG/ACT inhaler Inhale 2 puffs into the lungs 4 times daily as needed for Wheezing, Disp-1 Inhaler,R-0Print      ondansetron (ZOFRAN ODT) 4 MG disintegrating tablet Place 1 tablet under the tongue every 8 hours as needed for Nausea or Vomiting, Disp-20 tablet,R-0Normal      lisinopril (PRINIVIL;ZESTRIL) 20 MG tablet Take 1 tablet by mouth daily, Disp-90 tablet,R-3Normal      nebivolol (BYSTOLIC) 2.5 MG tablet Take 1 tablet by mouth daily, Disp-90 tablet,R-3Normal      atorvastatin (LIPITOR) 20 MG tablet Take 1 tablet by mouth daily, Disp-90 tablet,R-3Normal      amLODIPine (NORVASC) 10 MG tablet Take 1 tablet by mouth dailyHistorical Med      aspirin EC 81 MG EC tablet Take 1 tablet by mouth daily, Disp-90 tablet, R-1OTC             ALLERGIES     has No Known Allergies. FAMILY HISTORY     He indicated that his mother is . He indicated that his father is alive. He indicated that the status of his neg hx is unknown.   family history includes No Known Problems in his father and mother.     SOCIAL HISTORY       Social History     Socioeconomic History    Marital status:      Spouse name: Not on file    Number of children: Not on file    Years of education: Not on file    Highest education level: Not on file   Occupational History    Not on file   Tobacco Use    Smoking status: Never Smoker    Smokeless tobacco: Former User     Types: [address] Vaping Use: Never used   Substance and Sexual Activity    Alcohol use: Not Currently    Drug use: Never    Sexual activity: Not on file   Other Topics Concern    Not on file   Social History Narrative    Not on file     Social Determinants of Health     Financial Resource Strain:     Difficulty of Paying Living Expenses:    Food Insecurity:     Worried About Running Out of Food in the Last Year:     [address] in the Last Year:    Transportation Needs:     Lack of Transportation (Medical):  Lack of Transportation (Non-Medical):    Physical Activity:     Days of Exercise per Week:     Minutes of Exercise per Session:    Stress:     Feeling of Stress :    Social Connections:     Frequency of Communication with Friends and Family:     Frequency of Social Gatherings with Friends and Family:     Attends Scientologist Services:     Active Member of Clubs or Organizations:     Attends Club or Organization Meetings:     Marital Status:    Intimate Partner Violence:     Fear of Current or Ex-Partner:     Emotionally Abused:     Physically Abused:     Sexually Abused:        PHYSICAL EXAM     INITIAL VITALS:  weight is 210 lb (95.3 kg). His oral temperature is 98.1 °F (36.7 °C). His blood pressure is 154/95 (abnormal) and his pulse is 89. His respiration is 16 and oxygen saturation is 96%. Physical Exam  Constitutional:       Appearance: Normal appearance. He is well-developed. He is not ill-appearing. HENT:      Head: Normocephalic and atraumatic. Nose: Nose normal.      Mouth/Throat:      Mouth: Mucous membranes are moist.      Pharynx: Oropharynx is clear. Eyes:      Conjunctiva/sclera: Conjunctivae normal.   Cardiovascular:      Rate and Rhythm: Normal rate. Pulses: Normal pulses. Pulmonary:      Effort: Pulmonary effort is normal.   Abdominal:      Palpations: Abdomen is soft. Musculoskeletal:         General: Swelling, tenderness and signs of injury present.       Cervical back: Normal range of motion. Right knee: Swelling present. No deformity, effusion, erythema, ecchymosis or lacerations. Decreased range of motion. Tenderness present over the medial joint line and MCL. Skin:     General: Skin is warm and dry. Capillary Refill: Capillary refill takes less than 2 seconds. Neurological:      General: No focal deficit present. Mental Status: He is alert and oriented to person, place, and time. Psychiatric:         Behavior: Behavior normal.         DIFFERENTIAL DIAGNOSIS:   Sprain, strain, fracture, dislocation      DIAGNOSTIC RESULTS     EKG: All EKG's are interpreted by the Emergency Department Physician who eithersigns or Co-signs this chart in the absence of a cardiologist.        RADIOLOGY: non-plainfilm images(s) such as CT, Ultrasound and MRI are read by the radiologist.  Plain radiographic images are visualized and preliminarily interpreted by the emergency physician unless otherwise stated below. XR KNEE RIGHT (MIN 4 VIEWS)   Final Result    Normal knee series. **This report has been created using voice recognition software. It may contain minor errors which are inherent in voice recognition technology. **      Final report electronically signed by Dr. Erica Todd MD on 7/15/2021 6:42 PM            LABS:   Labs Reviewed - No data to display    EMERGENCY DEPARTMENT COURSE:   Vitals:    Vitals:    07/15/21 1725 07/15/21 1729   BP:  (!) 154/95   Pulse:  89   Resp:  16   Temp:  98.1 °F (36.7 °C)   TempSrc: Oral Oral   SpO2:  96%   Weight: 210 lb (95.3 kg)           [facility]    Patient was seen in the ER for a right knee injury. Appropriate imaging is ordered and reveals no fractures. Patient likely has an MCL/meniscus injury. Patient is placed in a hinged knee immobilizer and is instructed to use crutches. Ice and elevate. Follow up with [facility] as instructed.      Medications   HYDROcodone-acetaminophen (NORCO) 5-325 MG per tablet 1 tablet (1 tablet Oral Given 7/15/21 0377)         Patient was seen independently by myself. The patient's final impression and disposition and plan was determined by myself. Strict return precautions and follow up instructions were discussed with the patient prior to discharge, with which the patient agrees. Physical assessment findings, diagnostic testing(s) if applicable, and vital signs reviewed with patient/patient representative. Questions answered. Medications asdirected, including OTC medications for supportive care. Education provided on medications. Differential diagnosis(s) discussed with patient/patient representative. Home care/self care instructions reviewed withpatient/patient representative. Patient is to follow-up with family care provider in 2-3 days if no improvement. Patient is to go to the emergency department if symptoms worsen. Patient/patient representative isaware of care plan, questions answered, verbalizes understanding and is in agreement. CRITICAL CARE:   None    CONSULTS:  None    PROCEDURES:  None    FINAL IMPRESSION     1. Sprain of medial collateral ligament of right knee, initial encounter          DISPOSITION/PLAN   DISPOSITION Decision To Discharge 07/15/2021 06:58:05 PM      PATIENT REFERREDTO:  Kvng [address] [place], [place] [id number]  Go in 1 day  from 4 or call and request appt with Dr. Jhonny Ambrosio:  Discharge Medication List as of 7/15/2021  7:07 PM      START taking these medications    Details   HYDROcodone-acetaminophen (NORCO) 5-325 MG per tablet Take 1 tablet by mouth every 6 hours as needed for Pain for up to 3 days. , Disp-10 tablet, R-0Normal      naproxen (NAPROSYN) 500 MG tablet Take 1 tablet by mouth 2 times daily (with meals) for 24 doses, Disp-24 tablet, R-0Normal             (Please note that portions of this note were completed with a voice recognition

## 2021-08-03 ENCOUNTER — HOSPITAL ENCOUNTER (OUTPATIENT)
Dept: MRI IMAGING | Age: 56
Discharge: HOME | End: 2021-08-03
Payer: COMMERCIAL

## 2021-08-03 DIAGNOSIS — M25.561 ACUTE PAIN OF RIGHT KNEE: ICD-10-CM

## 2021-08-03 PROCEDURE — 73721 MRI JNT OF LWR EXTRE W/O DYE: CPT

## 2021-08-11 RX ORDER — NEBIVOLOL HYDROCHLORIDE 2.5 MG/1
TABLET ORAL
Qty: 90 TABLET | Refills: 0 | Status: SHIPPED | OUTPATIENT
Start: 2021-08-11 | End: 2022-07-28 | Stop reason: SDUPTHER

## 2021-08-11 RX ORDER — ATORVASTATIN CALCIUM 20 MG/1
TABLET, FILM COATED ORAL
Qty: 90 TABLET | Refills: 0 | Status: SHIPPED | OUTPATIENT
Start: 2021-08-11 | End: 2021-11-08

## 2021-08-11 RX ORDER — LISINOPRIL 20 MG/1
TABLET ORAL
Qty: 90 TABLET | Refills: 0 | Status: SHIPPED | OUTPATIENT
Start: 2021-08-11 | End: 2021-11-08

## 2021-08-26 ENCOUNTER — OFFICE VISIT (OUTPATIENT)
Dept: CARDIOLOGY CLINIC | Age: 56
End: 2021-08-26
Payer: COMMERCIAL

## 2021-08-26 VITALS
HEART RATE: 62 BPM | WEIGHT: 210.5 LBS | BODY MASS INDEX: 30.14 KG/M2 | SYSTOLIC BLOOD PRESSURE: 132 MMHG | DIASTOLIC BLOOD PRESSURE: 93 MMHG | HEIGHT: 70 IN

## 2021-08-26 DIAGNOSIS — I25.10 CORONARY ARTERY DISEASE INVOLVING NATIVE CORONARY ARTERY OF NATIVE HEART WITHOUT ANGINA PECTORIS: Primary | ICD-10-CM

## 2021-08-26 DIAGNOSIS — I10 HYPERTENSION, ESSENTIAL: Chronic | ICD-10-CM

## 2021-08-26 DIAGNOSIS — E78.5 DYSLIPIDEMIA: Chronic | ICD-10-CM

## 2021-08-26 DIAGNOSIS — Z98.890 S/P CARDIAC CATH: ICD-10-CM

## 2021-08-26 PROCEDURE — 99214 OFFICE O/P EST MOD 30 MIN: CPT | Performed by: INTERNAL MEDICINE

## 2021-08-26 NOTE — PROGRESS NOTES
Chief Complaint   Patient presents with    Check-Up    Coronary Artery Disease   originally  patient moved from [facility]  ? ? ? Hx of Mitral valve repair/ clip for MR 5 yrs back- transcathater- per pat ( not confirmed)  ? ? ? Hx of coronary stent 2 5 yrs back per pat ( Not confirmed from record)  Record from outside  Reviewed and the above claim of the pat could be verified          Pt here for 6 mo check up     Last EKG was 1/28/21    Denied cp, sob, palpitations, dizziness or edema    Nonsmoker    FHX  Mother had valve procedure, had stent at 72    Patient Active Problem List   Diagnosis    Coronary artery disease involving native coronary artery of native heart without angina pectoris    Diastolic murmur    Family history of early CAD    LVH (left ventricular hypertrophy)    Dyslipidemia    Former smoker    Heart failure with preserved ejection fraction ([address]    Hypertension, essential    Mitral regurgitation    Nicotine dependence, chewing tobacco, uncomplicated    Obesity (BMI 30-39. 9)    Dysfunction of both eustachian tubes    Right lateral epicondylitis    Left anterior cruciate ligament tear    Tear of lateral meniscus of left knee, current    S/P cardiac cath 2015 mod LAD lesion 30 to 50%- med RX    BAILEY (dyspnea on exertion)       Past Surgical History:   Procedure Laterality Date    APPENDECTOMY      CARDIAC CATHETERIZATION      CORONARY ANGIOPLASTY      INGUINAL HERNIA REPAIR      INGUINAL HERNIA REPAIR Left 05/13/2016    KNEE SURGERY Left 02/2021    MITRAL VALVE REPAIR      ROTATOR CUFF REPAIR Right     right and left    VENTRAL HERNIA REPAIR  05/13/2016       No Known Allergies     Family History   Problem Relation Age of Onset    No Known Problems Mother     No Known Problems Father     Colon Cancer Neg Hx     Prostate Cancer Neg Hx         Social History     Socioeconomic History    Marital status:      Spouse name: Not on file    Number of children: Not on file    Years of education: Not on file    Highest education level: Not on file   Occupational History    Not on file   Tobacco Use    Smoking status: Never Smoker    Smokeless tobacco: Former User     Types: Chew   Vaping Use    Vaping Use: Never used   Substance and Sexual Activity    Alcohol use: Not Currently    Drug use: Never    Sexual activity: Not on file   Other Topics Concern    Not on file   Social History Narrative    Not on file     Social Determinants of Health     Financial Resource Strain:     Difficulty of Paying Living Expenses:    Food Insecurity:     Worried About [address] in the Last Year:     [address] [employer] in the Last Year:    Transportation Needs:     Lack of Transportation (Medical):      Lack of Transportation (Non-Medical):    Physical Activity:     Days of Exercise per Week:     Minutes of Exercise per Session:    Stress:     Feeling of Stress :    Social Connections:     Frequency of Communication with Friends and Family:     Frequency of Social Gatherings with Friends and Family:     Attends Religion Services:     Active Member of Clubs or Organizations:     Attends Club or Organization Meetings:     Marital Status:    Intimate Partner Violence:     Fear of Current or Ex-Partner:     Emotionally Abused:     Physically Abused:     Sexually Abused:        Current Outpatient Medications   Medication Sig Dispense Refill    BYSTOLIC 2.5 MG tablet TAKE 1 TABLET DAILY 90 tablet 0    lisinopril (PRINIVIL;ZESTRIL) 20 MG tablet TAKE 1 TABLET DAILY 90 tablet 0    atorvastatin (LIPITOR) 20 MG tablet TAKE 1 TABLET DAILY 90 tablet 0    fluticasone (FLONASE) 50 MCG/ACT nasal spray USE 1 SPRAY IN EACH NOSTRIL DAILY 16 g 5    albuterol sulfate HFA (VENTOLIN HFA) 108 (90 Base) MCG/ACT inhaler Inhale 2 puffs into the lungs 4 times daily as needed for Wheezing 1 Inhaler 0    ondansetron (ZOFRAN ODT) 4 MG disintegrating tablet Place 1 tablet under the tongue every 8 hours as needed for Nausea or Vomiting 20 tablet 0    amLODIPine (NORVASC) 10 MG tablet Take 1 tablet by mouth daily      aspirin EC 81 MG EC tablet Take 1 tablet by mouth daily 90 tablet 1     No current facility-administered medications for this visit. Review of Systems -     General ROS: negative  Psychological ROS: negative  Hematological and Lymphatic ROS: No history of blood clots or bleeding disorder. Respiratory ROS: no cough,  or wheezing, the rest see HPI  Cardiovascular ROS: See HPI  Gastrointestinal ROS: negative  Genito-Urinary ROS: no dysuria, trouble voiding, or hematuria  Musculoskeletal ROS: negative  Neurological ROS: no TIA or stroke symptoms  Dermatological ROS: negative      Blood pressure (!) 132/93, pulse 62, height 5' 10\" (1.778 m), weight 210 lb 8 oz (95.5 kg). Physical Examination:    General appearance - alert, well appearing, and in no distress  HEENT- Pink conjunctiva  , Non-icteri sclera,PERRLA  Mental status - alert, oriented to person, place, and time  Neck - supple, no significant adenopathy, no JVD, or carotid bruits  Chest - clear to auscultation, no wheezes, rales or rhonchi, symmetric air entry  Heart - normal rate, regular rhythm, normal S1, S2, no murmurs, rubs, clicks or gallops  Abdomen - soft, nontender, nondistended, no masses or organomegaly  DAVI- no CVA or flank tenderness, no suprapubic tenderness  Neurological - alert, oriented, normal speech, no focal findings or movement disorder noted  Musculoskeletal/limbs - no joint tenderness, deformity or swelling   - peripheral pulses normal, no pedal edema, no clubbing or cyanosis  Skin - normal coloration and turgor, no rashes, no suspicious skin lesions noted  Psych- appropriate mood and affect    Lab  No results for input(s): CKTOTAL, CKMB, CKMBINDEX, TROPONINI in the last 72 hours.   CBC:   Lab Results   Component Value Date    WBC 7.5 10/29/2020    RBC 4.74 10/29/2020    HGB 15.1 10/29/2020    HCT 45.4 10/29/2020    MCV 95.8 10/29/2020    MCH 31.9 10/29/2020    MCHC 33.3 10/29/2020     10/29/2020    MPV 8.9 10/29/2020     BMP:    Lab Results   Component Value Date     10/29/2020    K 4.4 10/29/2020     10/29/2020    CO2 26 10/29/2020    BUN 17 10/29/2020    LABALBU 4.0 02/09/2021    CREATININE 0.9 10/29/2020    CALCIUM 9.1 10/29/2020    LABGLOM 88 10/29/2020    GLUCOSE 110 10/29/2020     Hepatic Function Panel:    Lab Results   Component Value Date    ALKPHOS 72 02/09/2021    ALT 30 02/09/2021    AST 20 02/09/2021    PROT 6.6 02/09/2021    BILITOT 0.6 02/09/2021    BILIDIR <0.2 02/09/2021    LABALBU 4.0 02/09/2021     Magnesium:    Lab Results   Component Value Date    MG 2.1 10/29/2020     Warfarin PT/INR:  No components found for: PTPATWAR, PTINRWAR  HgBA1c:  No results found for: LABA1C  FLP:    Lab Results   Component Value Date    TRIG 87 02/09/2021    HDL 55 02/09/2021    LDLCALC 76 02/09/2021     TSH:  No results found for: TSH      Reviewed the record from outside Hospital  Cardiac cath Dec 30, 2015 at [address] < [place]  ]EF 65%  LAD : 30-50%  LCx/RCA: Normal  RCA could not be accessed radially- had to be accessed through femoral access    Electronically signed by Robert Blandon MD at 12/30/2015 11:37 AM EST      Mid LAD lesion 50% stenosed.       Final Impression:        1.  Coronary artery disease as described above    2.  Mild to moderate stenosis of the mid left anterior descending artery.    3.  The left circumflex Artery and the right coronary artery appear     angiographically normal.  The right coronary artery has a posterior     takeoff.    4.  Left ventricular filling pressures are normal    5.  Left ventricular systolic function is normal           Plan:        1.  Adding calcium channel blockers for possible spasm.    2.  Aspirin and statin therapy is imperative.      Daryl Claros MD, [facility]  Echo Dec 2015  CONCLUSIONS     Left ventricular wall thickness mildly increased. Left ventricular cavity size normal.  Mild global hypokinesis. LVEF   45-50%.     Normal right ventricular size.   Mildly reduced right ventricular global systolic function.     Mild-to-moderate mitral regurgitation    ekg  8/29/19  Sinus  Bradycardia   WITHIN NORMAL LIMITS    Conclusions      Summary   Normal left ventricle size and systolic function. Ejection fraction was   estimated at 60 %. There were no regional left ventricular wall motion   abnormalities and wall thickness was within normal limits. The left atrium is Mildly dilated. Signature      ----------------------------------------------------------------   Electronically signed by Vipul Dawkins MD (Interpreting   physician) on 09/05/2019 at 05:39 PM    Conclusions      Summary   Lexiscan EKG stress test is not suggestive for ischemia. The nuclear images is not suggestive for myocardial ischemia. Signatures      ----------------------------------------------------------------   Electronically signed by Vipul Dawkins MD (Interpreting   Cardiologist) on 01/28/2021 at 19:12   ----------------------------------------------------------------        ekg 1/22/21  Sinus  Bradycardia   WITHIN NORMAL LIMITS      Assessment    ? ? ? Hx valve procedure mitral valve ( could not be verified from the record)     Diagnosis Orders   1. Coronary artery disease involving native coronary artery of native heart without angina pectoris     2. Hypertension, essential     3. Dyslipidemia     4. S/P cardiac cath 2015 mod LAD lesion 30 to 50%- med RX           Plan   The  current meds and labs reviewed    Continue the current treatment and with constant vigilance to changes in symptoms and also any potential side effects. Return for care or seek medical attention immediately if symptoms got worse and/or develop new symptoms. Hypertension, on medical treatment. Seems to be under good control. Patient is compliant with medical treatment. Hyperlipidemia: on statins, followed periodically. Patient need periodic lipid and liver profile. Coronary artery disease, seems to be stable. Denies angina or change in breathing pattern  Never had any intervention from the record from cardiac stand point  But moderate nonobstructive CAD noted  Cont asa and statins  Asa 81 mg po qd  Metoprolol stopped In [place] due to ED per pat  Now on Bystolic    Echo- WNL   report from  [facility] reviewed   NO stent noted   No Mitral valve surgery Noted from records  No Hx of cardiac or coronary intervention    Hx of CAD  BAILEY  lexisc nuc- neg    Lipid panel and liver function test before next appointment    D/w the pat the plan of care datila    patient is advised to exercise 30 min s a day three times a week and about weight loss ,balance diet and     More fruits and vegetables . Discussed use, benefit, and side effects of prescribed medications. All patient questions answered. Pt voiced understanding. Instructed to continue current medications, diet and exercise. Continue risk factor modification and medical management. Patient agreed with treatment plan. Follow up as directed.       RTC in 6 months    Natalya Madrid, [facility]

## 2021-11-08 RX ORDER — LISINOPRIL 20 MG/1
TABLET ORAL
Qty: 90 TABLET | Refills: 3 | Status: SHIPPED | OUTPATIENT
Start: 2021-11-08 | End: 2022-07-28 | Stop reason: SDUPTHER

## 2021-11-08 RX ORDER — NEBIVOLOL 2.5 MG/1
TABLET ORAL
Qty: 90 TABLET | Refills: 3 | OUTPATIENT
Start: 2021-11-08

## 2021-11-08 RX ORDER — ATORVASTATIN CALCIUM 20 MG/1
TABLET, FILM COATED ORAL
Qty: 90 TABLET | Refills: 3 | Status: SHIPPED | OUTPATIENT
Start: 2021-11-08 | End: 2022-07-28 | Stop reason: SDUPTHER

## 2021-11-22 ENCOUNTER — VIRTUAL VISIT (OUTPATIENT)
Dept: FAMILY MEDICINE CLINIC | Age: 56
End: 2021-11-22
Payer: COMMERCIAL

## 2021-11-22 DIAGNOSIS — J01.90 ACUTE RHINOSINUSITIS: Primary | ICD-10-CM

## 2021-11-22 PROCEDURE — 99213 OFFICE O/P EST LOW 20 MIN: CPT | Performed by: NURSE PRACTITIONER

## 2021-11-22 RX ORDER — AMOXICILLIN AND CLAVULANATE POTASSIUM 875; 125 MG/1; MG/1
1 TABLET, FILM COATED ORAL 2 TIMES DAILY
Qty: 20 TABLET | Refills: 0 | Status: SHIPPED | OUTPATIENT
Start: 2021-11-22 | End: 2021-12-02

## 2021-11-22 ASSESSMENT — ENCOUNTER SYMPTOMS
WHEEZING: 0
TROUBLE SWALLOWING: 0
SORE THROAT: 1
EYE PAIN: 0
VOMITING: 0
RHINORRHEA: 1
EYE REDNESS: 0
SHORTNESS OF BREATH: 0
COLOR CHANGE: 0
NAUSEA: 0
ABDOMINAL PAIN: 0
COUGH: 0
DIARRHEA: 0

## 2021-11-22 NOTE — PROGRESS NOTES
2021    TELEHEALTH EVALUATION -- Audio/Visual (During GQPOZ-12 public health emergency)    HPI:    Jacy Hale (:  1965) has requested an audio/video evaluation for the following concern(s):    URI Symptoms    HPI:      Symptoms have been present for 1 day(s). Symptoms are unchanged since they initially started. Fever? No  Runny nose or congestion? Yes - congestion   Cough? Yes - slight cough  Sore throat? Yes  Headache, fatigue, joint pains, muscle aches? Yes - HA and fatigue and body aches  Shortness of breath/Wheezing? No  Nausea/Vomiting/Diarrhea? No  Double Sickening? No  Sick contacts? No    Patient has tried advil, motrin with mild improvement. He is able to taste and smell    Review of Systems   Constitutional: Positive for fatigue. Negative for chills and diaphoresis. HENT: Positive for congestion, rhinorrhea and sore throat. Negative for trouble swallowing. Eyes: Negative for pain, redness and visual disturbance. Respiratory: Negative for cough, shortness of breath and wheezing. Cardiovascular: Negative for chest pain, palpitations and leg swelling. Gastrointestinal: Negative for abdominal pain, diarrhea, nausea and vomiting. Endocrine: Negative for polydipsia, polyphagia and polyuria. Genitourinary: Negative for decreased urine volume, dysuria, frequency and urgency. Musculoskeletal: Positive for arthralgias and myalgias. Skin: Negative for color change and rash. Allergic/Immunologic: Negative for environmental allergies, food allergies and immunocompromised state. Neurological: Positive for headaches. Negative for dizziness, tremors and syncope. Hematological: Negative for adenopathy. Psychiatric/Behavioral: Negative for behavioral problems, confusion, self-injury and suicidal ideas. All other systems reviewed and are negative. Prior to Visit Medications    Medication Sig Taking?  Authorizing Provider   amoxicillin-clavulanate (AUGMENTIN) 875-125 MG per tablet Take 1 tablet by mouth 2 times daily for 10 days Yes Hildegard Babinski, APRN - CNP   atorvastatin (LIPITOR) 20 MG tablet TAKE 1 TABLET DAILY  Neha Cruz MD   lisinopril (PRINIVIL;ZESTRIL) 20 MG tablet TAKE 1 TABLET DAILY  Neha Cruz MD   BYSTOLIC 2.5 MG tablet TAKE 1 TABLET DAILY  TAMELA Stewart CNP   fluticasone (FLONASE) 50 MCG/ACT nasal spray USE 1 SPRAY IN EACH NOSTRIL DAILY  Kun Welsh DO   albuterol sulfate HFA (VENTOLIN HFA) 108 (90 Base) MCG/ACT inhaler Inhale 2 puffs into the lungs 4 times daily as needed for Wheezing  Ethan Carmichael DO   ondansetron (ZOFRAN ODT) 4 MG disintegrating tablet Place 1 tablet under the tongue every 8 hours as needed for Nausea or Vomiting  TAMELA Roman - CNP   amLODIPine (NORVASC) 10 MG tablet Take 1 tablet by mouth daily  Historical Provider, MD   aspirin EC 81 MG EC tablet Take 1 tablet by mouth daily  Neha Cruz MD       Social History     Tobacco Use    Smoking status: Never Smoker    Smokeless tobacco: Former User     Types: Chew   Vaping Use    Vaping Use: Never used   Substance Use Topics    Alcohol use: Not Currently    Drug use: Never        No Known Allergies,   Past Medical History:   Diagnosis Date    ASHD (arteriosclerotic heart disease)     Dyslipidemia     Former smoker     GERD (gastroesophageal reflux disease)     Heart attack ([address]     Heart failure with preserved ejection fraction ([address]     Hx of prior LV dysfunction    Hypertension, essential     Mitral regurgitation     Nicotine dependence, chewing tobacco, uncomplicated     Obesity (BMI 30-39. 9)     S/P coronary artery stent placement     S/P mitral valve repair     Torn meniscus    ,   Past Surgical History:   Procedure Laterality Date    APPENDECTOMY      CARDIAC CATHETERIZATION      CORONARY ANGIOPLASTY      INGUINAL HERNIA REPAIR      INGUINAL HERNIA REPAIR Left 05/13/2016    KNEE SURGERY Left 02/2021    MITRAL VALVE REPAIR      ROTATOR CUFF REPAIR Right     right and left    VENTRAL HERNIA REPAIR  05/13/2016   ,   Family History   Problem Relation Age of Onset    No Known Problems Mother     No Known Problems Father     Colon Cancer Neg Hx     Prostate Cancer Neg Hx    ,   There is no immunization history on file for this patient.,   Health Maintenance   Topic Date Due    Hepatitis C screen  Never done    Pneumococcal 0-64 years Vaccine (1 of 2 - PPSV23) Never done    COVID-19 Vaccine (1) Never done    HIV screen  Never done    DTaP/Tdap/Td vaccine (1 - Tdap) Never done    Diabetes screen  Never done    Shingles Vaccine (1 of 2) Never done    Flu vaccine (1) Never done    Potassium monitoring  10/29/2021    Creatinine monitoring  10/29/2021    Lipid screen  02/09/2022    Colon cancer screen colonoscopy  02/11/2023    Hepatitis A vaccine  Aged Out    Hepatitis B vaccine  Aged Out    Hib vaccine  Aged Out    Meningococcal (ACWY) vaccine  Aged Out       PHYSICAL EXAMINATION:  [ INSTRUCTIONS:  \"[x]\" Indicates a positive item  \"[]\" Indicates a negative item  -- DELETE ALL ITEMS NOT EXAMINED]  Vital Signs: (As obtained by patient/caregiver or practitioner observation)    Blood pressure-  Heart rate-    Respiratory rate-    Temperature-  Pulse oximetry-     Constitutional: [x] Appears well-developed and well-nourished [x] No apparent distress      [] Abnormal-   Mental status  [x] Alert and awake  [x] Oriented to person/place/time [x]Able to follow commands      Eyes:  EOM    [x]  Normal  [] Abnormal-  Sclera  [x]  Normal  [] Abnormal -         Discharge [x]  None visible  [] Abnormal -    HENT:   [x] Normocephalic, atraumatic.   [] Abnormal   [x] Mouth/Throat: Mucous membranes are moist.     External Ears [x] Normal  [] Abnormal-     Neck: [x] No visualized mass     Pulmonary/Chest: [x] Respiratory effort normal.  [x] No visualized signs of difficulty breathing or respiratory distress        [] Abnormal- Musculoskeletal:   [x] Normal gait with no signs of ataxia         [x] Normal range of motion of neck        [] Abnormal-       Neurological:        [x] No Facial Asymmetry (Cranial nerve 7 motor function) (limited exam to video visit)          [x] No gaze palsy        [] Abnormal-         Skin:        [x] No significant exanthematous lesions or discoloration noted on facial skin         [] Abnormal-            Psychiatric:       [x] Normal Affect [x] No Hallucinations        [] Abnormal-     Other pertinent observable physical exam findings-     ASSESSMENT & PLAN  Ana Rosa Watson was seen today for concern for covid-19. Diagnoses and all orders for this visit:    Acute rhinosinusitis  -     amoxicillin-clavulanate (AUGMENTIN) 875-125 MG per tablet; Take 1 tablet by mouth 2 times daily for 10 days      - rec'd COVID testing, but he declines because he refuses to miss work due to getting holiday pay  - he is adamant that all he wants is antibiotic for sinus infection like he gets every year    Return if symptoms worsen or fail to improve. Holley Muir is a 55 y.o. male being evaluated by a Virtual Visit (video visit) encounter to address concerns as mentioned above. A caregiver was present when appropriate. Due to this being a TeleHealth encounter (During QPCAY-23 public health emergency), evaluation of the following organ systems was limited: Vitals/Constitutional/EENT/Resp/CV/GI//MS/Neuro/Skin/Heme-Lymph-Imm. Pursuant to the emergency declaration under the [facility] [address] authority and the [employer] and [employer] Act, this Virtual Visit was conducted with patient's (and/or legal guardian's) consent, to reduce the patient's risk of exposure to COVID-19 and provide necessary medical care.   The patient (and/or legal guardian) has also been advised to contact this office for worsening conditions or problems, and seek emergency medical treatment and/or call 911 if deemed necessary. Services were provided through a video synchronous discussion virtually to substitute for in-person clinic visit. Patient and provider were located at their individual homes. --TAMELA Healy CNP on 11/22/2021 at 12:09 PM    An electronic signature was used to authenticate this note.

## 2021-11-22 NOTE — Clinical Note
[address] [place] [address]  [address] [place] [address]. [facility] [address]  Phone: [phone]  Fax: [phone] [facility], APRN - CNP        November 22, 2021     Patient: Abi Anne   YOB: 1965   Date of Visit: 11/22/2021       To Whom It May Concern: It is my medical opinion that Joselito Perez {Work release (duty restriction):88215}. If you have any questions or concerns, please don't hesitate to call.     Sincerely,        Hildegard Babinski, TAMELA - CNP

## 2021-12-05 ENCOUNTER — HOSPITAL ENCOUNTER (EMERGENCY)
Age: 56
Discharge: HOME | End: 2021-12-06
Attending: EMERGENCY MEDICINE
Payer: COMMERCIAL

## 2021-12-05 ENCOUNTER — APPOINTMENT (OUTPATIENT)
Dept: CT IMAGING | Age: 56
End: 2021-12-05
Payer: COMMERCIAL

## 2021-12-05 ENCOUNTER — APPOINTMENT (OUTPATIENT)
Dept: GENERAL RADIOLOGY | Age: 56
End: 2021-12-05
Payer: COMMERCIAL

## 2021-12-05 VITALS
RESPIRATION RATE: 20 BRPM | BODY MASS INDEX: 28.88 KG/M2 | OXYGEN SATURATION: 98 % | DIASTOLIC BLOOD PRESSURE: 75 MMHG | WEIGHT: 195 LBS | HEIGHT: 69 IN | TEMPERATURE: 98.4 F | SYSTOLIC BLOOD PRESSURE: 125 MMHG | HEART RATE: 52 BPM

## 2021-12-05 DIAGNOSIS — M54.6 ACUTE MIDLINE THORACIC BACK PAIN: Primary | ICD-10-CM

## 2021-12-05 DIAGNOSIS — R07.9 CHEST PAIN IN ADULT: ICD-10-CM

## 2021-12-05 LAB
ANION GAP SERPL CALCULATED.3IONS-SCNC: 13 MEQ/L (ref 8–16)
BASOPHILS # BLD: 0.7 %
BASOPHILS ABSOLUTE: 0.1 THOU/MM3 (ref 0–0.1)
BUN BLDV-MCNC: 14 MG/DL (ref 7–22)
CALCIUM SERPL-MCNC: 9.1 MG/DL (ref 8.5–10.5)
CHLORIDE BLD-SCNC: 104 MEQ/L (ref 98–111)
CO2: 22 MEQ/L (ref 23–33)
CREAT SERPL-MCNC: 0.9 MG/DL (ref 0.4–1.2)
D-DIMER QUANTITATIVE: 592 NG/ML FEU (ref 0–500)
EKG ATRIAL RATE: 54 BPM
EKG P AXIS: 28 DEGREES
EKG P-R INTERVAL: 162 MS
EKG Q-T INTERVAL: 434 MS
EKG QRS DURATION: 74 MS
EKG QTC CALCULATION (BAZETT): 411 MS
EKG R AXIS: 20 DEGREES
EKG T AXIS: 44 DEGREES
EKG VENTRICULAR RATE: 54 BPM
EOSINOPHIL # BLD: 6 %
EOSINOPHILS ABSOLUTE: 0.5 THOU/MM3 (ref 0–0.4)
ERYTHROCYTE [DISTWIDTH] IN BLOOD BY AUTOMATED COUNT: 12.5 % (ref 11.5–14.5)
ERYTHROCYTE [DISTWIDTH] IN BLOOD BY AUTOMATED COUNT: 43 FL (ref 35–45)
GFR SERPL CREATININE-BSD FRML MDRD: 87 ML/MIN/1.73M2
GLUCOSE BLD-MCNC: 93 MG/DL (ref 70–108)
HCT VFR BLD CALC: 42.9 % (ref 42–52)
HEMOGLOBIN: 14.4 GM/DL (ref 14–18)
IMMATURE GRANS (ABS): 0.02 THOU/MM3 (ref 0–0.07)
IMMATURE GRANULOCYTES: 0.3 %
LYMPHOCYTES # BLD: 31.9 %
LYMPHOCYTES ABSOLUTE: 2.5 THOU/MM3 (ref 1–4.8)
MCH RBC QN AUTO: 31.8 PG (ref 26–33)
MCHC RBC AUTO-ENTMCNC: 33.6 GM/DL (ref 32.2–35.5)
MCV RBC AUTO: 94.7 FL (ref 80–94)
MONOCYTES # BLD: 8.2 %
MONOCYTES ABSOLUTE: 0.6 THOU/MM3 (ref 0.4–1.3)
NUCLEATED RED BLOOD CELLS: 0 /100 WBC
OSMOLALITY CALCULATION: 277.7 MOSMOL/KG (ref 275–300)
PLATELET # BLD: 190 THOU/MM3 (ref 130–400)
PMV BLD AUTO: 9.3 FL (ref 9.4–12.4)
POTASSIUM REFLEX MAGNESIUM: 3.9 MEQ/L (ref 3.5–5.2)
PRO-BNP: 52 PG/ML (ref 0–900)
RBC # BLD: 4.53 MILL/MM3 (ref 4.7–6.1)
REASON FOR REJECTION: NORMAL
REJECTED TEST: NORMAL
SEG NEUTROPHILS: 52.9 %
SEGMENTED NEUTROPHILS ABSOLUTE COUNT: 4.1 THOU/MM3 (ref 1.8–7.7)
SODIUM BLD-SCNC: 139 MEQ/L (ref 135–145)
TROPONIN T: < 0.01 NG/ML
TROPONIN T: < 0.01 NG/ML
WBC # BLD: 7.7 THOU/MM3 (ref 4.8–10.8)

## 2021-12-05 PROCEDURE — 80048 BASIC METABOLIC PNL TOTAL CA: CPT

## 2021-12-05 PROCEDURE — 84484 ASSAY OF TROPONIN QUANT: CPT

## 2021-12-05 PROCEDURE — 6360000004 HC RX CONTRAST MEDICATION: Performed by: EMERGENCY MEDICINE

## 2021-12-05 PROCEDURE — 85379 FIBRIN DEGRADATION QUANT: CPT

## 2021-12-05 PROCEDURE — 71046 X-RAY EXAM CHEST 2 VIEWS: CPT

## 2021-12-05 PROCEDURE — 93005 ELECTROCARDIOGRAM TRACING: CPT | Performed by: EMERGENCY MEDICINE

## 2021-12-05 PROCEDURE — 93010 ELECTROCARDIOGRAM REPORT: CPT | Performed by: INTERNAL MEDICINE

## 2021-12-05 PROCEDURE — 83880 ASSAY OF NATRIURETIC PEPTIDE: CPT

## 2021-12-05 PROCEDURE — 99284 EMERGENCY DEPT VISIT MOD MDM: CPT

## 2021-12-05 PROCEDURE — 6370000000 HC RX 637 (ALT 250 FOR IP): Performed by: EMERGENCY MEDICINE

## 2021-12-05 PROCEDURE — 85025 COMPLETE CBC W/AUTO DIFF WBC: CPT

## 2021-12-05 PROCEDURE — 71275 CT ANGIOGRAPHY CHEST: CPT

## 2021-12-05 RX ORDER — NITROGLYCERIN 0.4 MG/1
0.4 TABLET SUBLINGUAL EVERY 5 MIN PRN
Status: DISCONTINUED | OUTPATIENT
Start: 2021-12-05 | End: 2021-12-06 | Stop reason: HOSPADM

## 2021-12-05 RX ORDER — ASPIRIN 81 MG/1
243 TABLET, CHEWABLE ORAL ONCE
Status: COMPLETED | OUTPATIENT
Start: 2021-12-05 | End: 2021-12-05

## 2021-12-05 RX ADMIN — NITROGLYCERIN 0.4 MG: 0.4 TABLET, ORALLY DISINTEGRATING SUBLINGUAL at 20:12

## 2021-12-05 RX ADMIN — ASPIRIN 81 MG CHEWABLE TABLET 243 MG: 81 TABLET CHEWABLE at 20:12

## 2021-12-05 RX ADMIN — IOPAMIDOL 80 ML: 755 INJECTION, SOLUTION INTRAVENOUS at 22:38

## 2021-12-05 ASSESSMENT — PAIN DESCRIPTION - LOCATION: LOCATION: JAW;SHOULDER

## 2021-12-05 ASSESSMENT — ENCOUNTER SYMPTOMS
NAUSEA: 1
BACK PAIN: 1

## 2021-12-05 ASSESSMENT — PAIN SCALES - GENERAL
PAINLEVEL_OUTOF10: 7
PAINLEVEL_OUTOF10: 4

## 2021-12-05 ASSESSMENT — PAIN DESCRIPTION - PAIN TYPE: TYPE: ACUTE PAIN

## 2021-12-06 NOTE — ED NOTES
Dr. Monico Hannon notified that pts heart rate is running in the 40s. He stated \" noted. \" I will continue to monitor.       Meme Naranjo RN  12/05/21 2138
Labs collected.       Madiha Vazquez RN  12/05/21 1216
Nitro worked pts chest pain went down to 4. He states he did not want another dose.       Linda Olvera RN  12/05/21 2122       Linda Olvera RN  12/05/21 2123
Pt ambulated to restroom. Palmer was steady. Returned to bed. RR is regular and unlabored. Call light in reach.       Marea Homans, RN  12/05/21 4171
Pt is resting in bed, asking how much longer. I answered all questions. Denies any other needs at this time.      Lisa Posadas RN  12/05/21 0554
Pt updated on the needs for CT scan.         Gisselle Fleming, RN  12/05/21 [address] RN  12/05/21 5960
No

## 2021-12-06 NOTE — ED PROVIDER NOTES
use drugs. PHYSICAL EXAM       INITIAL VITALS:  height is 5' 9\" (1.753 m) and weight is 195 lb (88.5 kg). His oral temperature is 98.4 °F (36.9 °C). His blood pressure is 125/75 and his pulse is 52. His respiration is 20 and oxygen saturation is 98%. Physical Exam  Vitals and nursing note reviewed. Constitutional:       General: He is not in acute distress. Appearance: Normal appearance. Cardiovascular:      Rate and Rhythm: Regular rhythm. Bradycardia present. Heart sounds: Normal heart sounds. No murmur heard. Pulmonary:      Effort: Pulmonary effort is normal.      Breath sounds: Normal breath sounds. Chest:      Chest wall: No tenderness. Abdominal:      Palpations: Abdomen is soft. Tenderness: There is no abdominal tenderness. There is no guarding or rebound. Musculoskeletal:         General: No swelling or tenderness. Right lower leg: No edema. Left lower leg: No edema. Skin:     General: Skin is warm and dry. Capillary Refill: Capillary refill takes less than 2 seconds. Neurological:      General: No focal deficit present. Mental Status: He is alert. Psychiatric:         Mood and Affect: Mood normal.         Behavior: Behavior normal.       DIAGNOSTIC RESULTS     EKG: All EKG's are interpreted by the Emergency Department Physician who either signs or Co-signs this chart in the absence of a cardiologist.    EKG at [facility] shows a sinus bradycardia with rate 54 bpm.  Intervals within normal limits. Normal axis. R wave progression is maintained. No T wave inversion. No ST segment depression/elevation or evidence for acute ischemia/infarction. RADIOLOGY:   I directly visualized the following images and reviewed the radiologist interpretations:    CTA CHEST W WO CONTRAST - r/o Pulmonary Embolism   Final Result   No pulmonary emboli or pulmonary infiltrates. **This report has been created using voice recognition software.  It may contain minor errors which are inherent in voice recognition technology. **      Final report electronically signed by Dr Tianna Williamson on 12/5/2021 10:55 PM      XR CHEST (2 VW)   Final Result   There is no acute intrathoracic process. **This report has been created using voice recognition software. It may contain minor errors which are inherent in voice recognition technology. **      Final report electronically signed by Dr Tianna Williamson on 12/5/2021 7:54 PM          LABS:  Labs Reviewed   D-DIMER, QUANTITATIVE - Abnormal; Notable for the following components:       Result Value    D-Dimer, Quant 592.00 (*)     All other components within normal limits   CBC WITH AUTO DIFFERENTIAL - Abnormal; Notable for the following components:    RBC 4.53 (*)     MCV 94.7 (*)     MPV 9.3 (*)     Eosinophils Absolute 0.5 (*)     All other components within normal limits   BASIC METABOLIC PANEL W/ REFLEX TO MG FOR LOW K - Abnormal; Notable for the following components:    CO2 22 (*)     All other components within normal limits   GLOMERULAR FILTRATION RATE, ESTIMATED - Abnormal; Notable for the following components:    Est, Glom Filt Rate 87 (*)     All other components within normal limits   SPECIMEN REJECTION   BRAIN NATRIURETIC PEPTIDE   TROPONIN   ANION GAP   OSMOLALITY   TROPONIN       EMERGENCY DEPARTMENT COURSE:     Vitals:    Vitals:    12/05/21 1927 12/05/21 2123 12/05/21 2255   BP: (!) 156/97 139/83 125/75   Pulse: 53 (!) 46 52   Resp: 20 20 20   Temp: 98.4 °F (36.9 °C)     TempSrc: Oral     SpO2: 98% 97% 98%   Weight: 195 lb (88.5 kg)     Height: 5' 9\" (1.753 m)       -------------------------  BP: 125/75, Temp: 98.4 °F (36.9 °C), Pulse: 52, Resp: 20      MEDICAL DECISION MAKING:     Additional aspirin and nitroglycerin have been ordered awaiting labs and imaging    Elevated D-dimer  CT chest added    Patient updated on all results  Remains mildly bradycardic  Stable blood pressure  No new

## 2021-12-06 NOTE — ED TRIAGE NOTES
Pt presents to the ED with complaints of \"I think I am having a heart attack\". Pt is complaining of jaw and shoulder pain. Pt states pain is 7/10. Pt denies taking anything for pain. EKG completed.

## 2021-12-07 ENCOUNTER — OFFICE VISIT (OUTPATIENT)
Dept: CARDIOLOGY CLINIC | Age: 56
End: 2021-12-07
Payer: COMMERCIAL

## 2021-12-07 VITALS
HEIGHT: 70 IN | DIASTOLIC BLOOD PRESSURE: 84 MMHG | WEIGHT: 212.5 LBS | HEART RATE: 58 BPM | SYSTOLIC BLOOD PRESSURE: 129 MMHG | BODY MASS INDEX: 30.42 KG/M2

## 2021-12-07 DIAGNOSIS — Z98.890 S/P CARDIAC CATH: ICD-10-CM

## 2021-12-07 DIAGNOSIS — I10 HYPERTENSION, ESSENTIAL: Chronic | ICD-10-CM

## 2021-12-07 DIAGNOSIS — E78.5 DYSLIPIDEMIA: Chronic | ICD-10-CM

## 2021-12-07 DIAGNOSIS — R07.89 CHEST PAIN, ATYPICAL: Primary | ICD-10-CM

## 2021-12-07 DIAGNOSIS — R00.2 PALPITATIONS: ICD-10-CM

## 2021-12-07 DIAGNOSIS — I25.10 CORONARY ARTERY DISEASE INVOLVING NATIVE CORONARY ARTERY OF NATIVE HEART, UNSPECIFIED WHETHER ANGINA PRESENT: ICD-10-CM

## 2021-12-07 PROCEDURE — 99214 OFFICE O/P EST MOD 30 MIN: CPT | Performed by: INTERNAL MEDICINE

## 2021-12-07 RX ORDER — ASPIRIN 325 MG
325 TABLET, DELAYED RELEASE (ENTERIC COATED) ORAL DAILY
Qty: 30 TABLET | Refills: 0 | COMMUNITY
Start: 2021-12-07 | End: 2022-01-20 | Stop reason: ALTCHOICE

## 2021-12-07 NOTE — PROGRESS NOTES
Chief Complaint   Patient presents with    Follow-Up from Hospital    Coronary Artery Disease   originally  patient moved from [facility]  ? ? ? Hx of Mitral valve repair/ clip for MR 5 yrs back- transcathater- per pat ( not confirmed)  ? ? ? Hx of coronary stent 2 5 yrs back per pat ( Not confirmed from record)  Record from outside  Reviewed and the above claim of the pat could be verified      3 month F/u and sent from ED for chest pain        Chest pain 2 days back seen in the ER-last Sunday started 1 pm and constant,sharp, 6/10, radiate to arm, and jaw and no respond to NTG. And asa. The chest pain resolved progressively got better and none since yesterday night  Associated palpitation  No sweaty or sob    No cp since yesterday  No cp before this incident    Denied sob, dizziness or edema    EKG done 12-5-2021. Nonsmoker    FHX  Mother had valve procedure, had stent at 72    Patient Active Problem List   Diagnosis    Coronary artery disease involving native coronary artery of native heart without angina pectoris    Diastolic murmur    Family history of early CAD    LVH (left ventricular hypertrophy)    Dyslipidemia    Former smoker    Heart failure with preserved ejection fraction ([address]    Hypertension, essential    Mitral regurgitation    Nicotine dependence, chewing tobacco, uncomplicated    Obesity (BMI 30-39. 9)    Dysfunction of both eustachian tubes    Right lateral epicondylitis    Left anterior cruciate ligament tear    Tear of lateral meniscus of left knee, current    S/P cardiac cath 2015 mod LAD lesion 30 to 50%- med RX    BAILEY (dyspnea on exertion)    Chest pain, atypical    Coronary artery disease involving native coronary artery of native heart    Palpitations one episode       Past Surgical History:   Procedure Laterality Date    APPENDECTOMY      CARDIAC CATHETERIZATION      CORONARY ANGIOPLASTY      INGUINAL HERNIA REPAIR      INGUINAL HERNIA REPAIR Left 05/13/2016    KNEE SURGERY Left 02/2021    MITRAL VALVE REPAIR      ROTATOR CUFF REPAIR Right     right and left    VENTRAL HERNIA REPAIR  05/13/2016       No Known Allergies     Family History   Problem Relation Age of Onset    No Known Problems Mother     No Known Problems Father     Colon Cancer Neg Hx     Prostate Cancer Neg Hx         Social History     Socioeconomic History    Marital status:      Spouse name: Not on file    Number of children: Not on file    Years of education: Not on file    Highest education level: Not on file   Occupational History    Not on file   Tobacco Use    Smoking status: Never Smoker    Smokeless tobacco: Former User     Types: Chew   Vaping Use    Vaping Use: Never used   Substance and Sexual Activity    Alcohol use: Not Currently    Drug use: Never    Sexual activity: Not on file   Other Topics Concern    Not on file   Social History Narrative    Not on file     Social Determinants of Health     Financial Resource Strain:     Difficulty of Paying Living Expenses: Not on file   Food Insecurity:     Worried About [address] [employer] in the Last Year: Not on file    Sridevi of Food in the Last Year: Not on file   Transportation Needs:     Lack of Transportation (Medical): Not on file    Lack of Transportation (Non-Medical):  Not on file   Physical Activity:     Days of Exercise per Week: Not on file    Minutes of Exercise per Session: Not on file   Stress:     Feeling of Stress : Not on file   Social Connections:     Frequency of Communication with Friends and Family: Not on file    Frequency of Social Gatherings with Friends and Family: Not on file    Attends Temple Services: Not on file    Active Member of Clubs or Organizations: Not on file    Attends Club or Organization Meetings: Not on file    Marital Status: Not on file   Intimate Partner Violence:     Fear of Current or Ex-Partner: Not on file    Emotionally Abused: Not on file    Physically Abused: Not on file    Sexually Abused: Not on file   Housing Stability:     Unable to Pay for Housing in the Last Year: Not on file    Number of Places Lived in the Last Year: Not on file    Unstable Housing in the Last Year: Not on file       Current Outpatient Medications   Medication Sig Dispense Refill    aspirin 325 MG EC tablet Take 1 tablet by mouth daily for 7 days 30 tablet 0    atorvastatin (LIPITOR) 20 MG tablet TAKE 1 TABLET DAILY 90 tablet 3    lisinopril (PRINIVIL;ZESTRIL) 20 MG tablet TAKE 1 TABLET DAILY 90 tablet 3    BYSTOLIC 2.5 MG tablet TAKE 1 TABLET DAILY 90 tablet 0    fluticasone (FLONASE) 50 MCG/ACT nasal spray USE 1 SPRAY IN EACH NOSTRIL DAILY 16 g 5    albuterol sulfate HFA (VENTOLIN HFA) 108 (90 Base) MCG/ACT inhaler Inhale 2 puffs into the lungs 4 times daily as needed for Wheezing 1 Inhaler 0    ondansetron (ZOFRAN ODT) 4 MG disintegrating tablet Place 1 tablet under the tongue every 8 hours as needed for Nausea or Vomiting 20 tablet 0    amLODIPine (NORVASC) 10 MG tablet Take 1 tablet by mouth daily      aspirin EC 81 MG EC tablet Take 1 tablet by mouth daily 90 tablet 1     No current facility-administered medications for this visit. Review of Systems -     General ROS: negative  Psychological ROS: negative  Hematological and Lymphatic ROS: No history of blood clots or bleeding disorder. Respiratory ROS: no cough,  or wheezing, the rest see HPI  Cardiovascular ROS: See HPI  Gastrointestinal ROS: negative  Genito-Urinary ROS: no dysuria, trouble voiding, or hematuria  Musculoskeletal ROS: negative  Neurological ROS: no TIA or stroke symptoms  Dermatological ROS: negative      Blood pressure 129/84, pulse 58, height 5' 10\" (1.778 m), weight 212 lb 8 oz (96.4 kg).         Physical Examination:    General appearance - alert, well appearing, and in no distress  HEENT- Pink conjunctiva  , Non-icteri sclera,PERRLA  Mental status - alert, oriented to person, place, and time  Neck - supple, no significant adenopathy, no JVD, or carotid bruits  Chest - clear to auscultation, no wheezes, rales or rhonchi, symmetric air entry  Heart - normal rate, regular rhythm, normal S1, S2, no murmurs, rubs, clicks or gallops  Abdomen - soft, nontender, nondistended, no masses or organomegaly  DAVI- no CVA or flank tenderness, no suprapubic tenderness  Neurological - alert, oriented, normal speech, no focal findings or movement disorder noted  Musculoskeletal/limbs - no joint tenderness, deformity or swelling   - peripheral pulses normal, no pedal edema, no clubbing or cyanosis  Skin - normal coloration and turgor, no rashes, no suspicious skin lesions noted  Psych- appropriate mood and affect    Lab  No results for input(s): CKTOTAL, CKMB, CKMBINDEX, TROPONINI in the last 72 hours.   CBC:   Lab Results   Component Value Date    WBC 7.7 12/05/2021    RBC 4.53 12/05/2021    HGB 14.4 12/05/2021    HCT 42.9 12/05/2021    MCV 94.7 12/05/2021    MCH 31.8 12/05/2021    MCHC 33.6 12/05/2021     12/05/2021    MPV 9.3 12/05/2021     BMP:    Lab Results   Component Value Date     12/05/2021    K 3.9 12/05/2021     12/05/2021    CO2 22 12/05/2021    BUN 14 12/05/2021    LABALBU 4.0 02/09/2021    CREATININE 0.9 12/05/2021    CALCIUM 9.1 12/05/2021    LABGLOM 87 12/05/2021    GLUCOSE 93 12/05/2021     Hepatic Function Panel:    Lab Results   Component Value Date    ALKPHOS 72 02/09/2021    ALT 30 02/09/2021    AST 20 02/09/2021    PROT 6.6 02/09/2021    BILITOT 0.6 02/09/2021    BILIDIR <0.2 02/09/2021    LABALBU 4.0 02/09/2021     Magnesium:    Lab Results   Component Value Date    MG 2.1 10/29/2020     Warfarin PT/INR:  No components found for: PTPATWAR, PTINRWAR  HgBA1c:  No results found for: LABA1C  FLP:    Lab Results   Component Value Date    TRIG 87 02/09/2021    HDL 55 02/09/2021    LDLCALC 76 02/09/2021     TSH:  No results found for: TSH      Reviewed the record from outside Basia Box MD (Interpreting   Cardiologist) on 01/28/2021 at 19:12   ----------------------------------------------------------------        ekg 1/22/21  Sinus  Bradycardia   WITHIN NORMAL LIMITS    ekg 12/7/21  Sinus bradycardia Otherwise normal ECG When compared with ECG of 29-OCT-2020 00:30, No significant change was found Confirmed by ELENITA Tamayo MD (3    Troponinn neg x2    Assessment    ? ? ? Hx valve procedure mitral valve ( could not be verified from the record)     Diagnosis Orders   1. Chest pain, atypical  ECHO Complete 2D W Doppler W Color    NM MYOCARDIAL SPECT REST EXERCISE OR RX   2. Coronary artery disease involving native coronary artery of native heart, unspecified whether angina present  ECHO Complete 2D W Doppler W Color    NM MYOCARDIAL SPECT REST EXERCISE OR RX   3. Hypertension, essential     4. Dyslipidemia     5. S/P cardiac cath 2015 mod LAD lesion 30 to 50%- med RX     6. Palpitations one episode           Plan   The  current meds and labs reviewed    Continue the current treatment and with constant vigilance to changes in symptoms and also any potential side effects. Return for care or seek medical attention immediately if symptoms got worse and/or develop new symptoms. Hypertension, on medical treatment. Seems to be under good control. Patient is compliant with medical treatment. Hyperlipidemia: on statins, followed periodically. Patient need periodic lipid and liver profile.     Coronary artery disease, mod non-obst  Never had any intervention from the record from cardiac stand point  But moderate nonobstructive CAD noted  Cont asa and statins  Asa 81 mg po qd  Metoprolol stopped In [place] due to ED per pat  Now on Bystolic    Echo- WNL   report from  [facility] reviewed   NO stent noted   No Mitral valve surgery Noted from records  No Hx of cardiac or coronary intervention    Hx of CAD- mod nonobst  Chest wall tenderness onn the  RT costiochondral junction  Asa 325 po qd for 7 days then 81 mg po qd  Chest pain  Palpitation  Echo  Ex nuc  Advised to get to ER if any recurrence of cp  ED record reviewed      Lipid panel and liver function test before next appointment    D/w the pat the plan of care datila    patient is advised to exercise 30 min s a day three times a week and about weight loss ,balance diet and     More fruits and vegetables . Discussed use, benefit, and side effects of prescribed medications. All patient questions answered. Pt voiced understanding. Instructed to continue current medications, diet and exercise. Continue risk factor modification and medical management. Patient agreed with treatment plan. Follow up as directed.       RTC in 1 months    Refugio [facility]

## 2021-12-16 ENCOUNTER — HOSPITAL ENCOUNTER (OUTPATIENT)
Dept: NON INVASIVE DIAGNOSTICS | Age: 56
Discharge: HOME | End: 2021-12-16
Payer: COMMERCIAL

## 2021-12-16 DIAGNOSIS — R07.89 CHEST PAIN, ATYPICAL: ICD-10-CM

## 2021-12-16 DIAGNOSIS — I25.10 CORONARY ARTERY DISEASE INVOLVING NATIVE CORONARY ARTERY OF NATIVE HEART, UNSPECIFIED WHETHER ANGINA PRESENT: ICD-10-CM

## 2021-12-16 LAB
LV EF: 57 %
LV EF: 60 %
LVEF MODALITY: NORMAL
LVEF MODALITY: NORMAL

## 2021-12-16 PROCEDURE — 93306 TTE W/DOPPLER COMPLETE: CPT

## 2021-12-16 PROCEDURE — 93017 CV STRESS TEST TRACING ONLY: CPT | Performed by: INTERNAL MEDICINE

## 2021-12-16 PROCEDURE — A9500 TC99M SESTAMIBI: HCPCS | Performed by: INTERNAL MEDICINE

## 2021-12-16 PROCEDURE — 3430000000 HC RX DIAGNOSTIC RADIOPHARMACEUTICAL: Performed by: INTERNAL MEDICINE

## 2021-12-16 PROCEDURE — 78452 HT MUSCLE IMAGE SPECT MULT: CPT

## 2021-12-16 RX ADMIN — Medication 32.6 MILLICURIE: at 10:14

## 2021-12-16 RX ADMIN — Medication 9.4 MILLICURIE: at 08:34

## 2022-01-03 ENCOUNTER — TELEPHONE (OUTPATIENT)
Dept: FAMILY MEDICINE CLINIC | Age: 57
End: 2022-01-03

## 2022-01-03 ENCOUNTER — VIRTUAL VISIT (OUTPATIENT)
Dept: FAMILY MEDICINE CLINIC | Age: 57
End: 2022-01-03
Payer: COMMERCIAL

## 2022-01-03 ENCOUNTER — HOSPITAL ENCOUNTER (OUTPATIENT)
Age: 57
Setting detail: SPECIMEN
Discharge: HOME | End: 2022-01-03
Payer: COMMERCIAL

## 2022-01-03 VITALS — RESPIRATION RATE: 14 BRPM

## 2022-01-03 DIAGNOSIS — I50.32 CHRONIC HEART FAILURE WITH PRESERVED EJECTION FRACTION (HCC): Chronic | ICD-10-CM

## 2022-01-03 DIAGNOSIS — I10 HYPERTENSION, ESSENTIAL: Chronic | ICD-10-CM

## 2022-01-03 DIAGNOSIS — U07.1 COVID-19: Primary | ICD-10-CM

## 2022-01-03 DIAGNOSIS — E66.9 OBESITY (BMI 30-39.9): Chronic | ICD-10-CM

## 2022-01-03 DIAGNOSIS — U07.1 COVID-19: ICD-10-CM

## 2022-01-03 LAB
INFLUENZA A: NOT DETECTED
INFLUENZA B: NOT DETECTED
SARS-COV-2 RNA, RT PCR: DETECTED

## 2022-01-03 PROCEDURE — 87636 SARSCOV2 & INF A&B AMP PRB: CPT

## 2022-01-03 PROCEDURE — 99214 OFFICE O/P EST MOD 30 MIN: CPT | Performed by: FAMILY MEDICINE

## 2022-01-03 RX ORDER — CHOLECALCIFEROL (VITAMIN D3) 50 MCG
2000 TABLET ORAL DAILY
Qty: 30 TABLET | Refills: 0 | Status: SHIPPED | OUTPATIENT
Start: 2022-01-03 | End: 2022-02-15 | Stop reason: ALTCHOICE

## 2022-01-03 RX ORDER — MULTIVIT WITH MINERALS/LUTEIN
1000 TABLET ORAL DAILY
Qty: 30 TABLET | Refills: 0 | Status: SHIPPED | OUTPATIENT
Start: 2022-01-03 | End: 2022-02-15 | Stop reason: ALTCHOICE

## 2022-01-03 RX ORDER — BENZONATATE 100 MG/1
CAPSULE ORAL
Qty: 60 CAPSULE | Refills: 0 | Status: SHIPPED | OUTPATIENT
Start: 2022-01-03 | End: 2022-01-13

## 2022-01-03 NOTE — PROGRESS NOTES
Chief Complaint   Patient presents with    Positive For Covid-19       TELEHEALTH EVALUATION -- Audio/Visual (During GVFBP-67 public health emergency)    Due to COVID 19 outbreak, patient's office visit was converted to a virtual visit. Patient was contacted and agreed to proceed with a virtual visit via [email or web address]  The risks and benefits of converting to a virtual visit were discussed in light of the current infectious disease epidemic. Patient also understood that insurance coverage and co-pays are up to their individual insurance plans. Services were provided through a video synchronous discussion virtually to substitute for in-person clinic visit    Location of patient: home  Location of physician: office    Identification confirmed by: Patient : 1965    Pursuant to the emergency declaration under the [facility], [facility] waiver authority and the [employer] Act, this Virtual  Visit was conducted, with patient's (and/or legal guardian's) consent, to reduce the patient's risk of exposure to COVID-19 and provide necessary medical care. The patient (and/or legal guardian) has also been advised to contact this office for worsening conditions or problems, and seek emergency medical treatment and/or call 911 if deemed necessary. Services were provided through a video synchronous discussion virtually to substitute for in-person clinic visit. Due to this being a TeleHealth encounter, evaluation of certain organ systems is limited. History obtained from the patient. SUBJECTIVE:  Cori Lewis is a 55 y.o. male that presents today for     -COVID:  Sxs started: ~  Dx date: , rapid home test  Current sxs: cough, fatigue, body aches. No SOB or chest pain  RF: obesity, HTN, male gender, age 55, HFpEF    + rapid antigen  Work require a  PCR confirmation, will get that done today.    Will start Vit C/D and zinc  Will get setup for monoclonal ATB treatment  Isolation guidelines reviewed  Reviewed ER precautions, pt understands. Age/Gender Health Maintenance    Lipid -   Lab Results   Component Value Date    CHOL 148 02/09/2021    CHOL 148 01/15/2020     Lab Results   Component Value Date    TRIG 87 02/09/2021    TRIG 60 01/15/2020     Lab Results   Component Value Date    HDL 55 02/09/2021    HDL 57 01/15/2020     Lab Results   Component Value Date    LDLCALC 76 02/09/2021    [address] 01/15/2020     No results found for: LABVLDL, VLDL  No results found for: CHOLHDLRATIO      DM Screen -   Lab Results   Component Value Date    GLUCOSE 93 12/05/2021     No results found for: LABA1C    Colon Cancer Screening - NEG FEB 2013, repeat 10 years  Lung Cancer Screening (Age 54 to [de-identified] with 30 pack year hx, current smoker or quit within past 13 years) - age 54 if meets criteria    Tetanus - declines July 2020  Influenza Vaccine - Candidate FALL 2021  Pneumonia Vaccine - declines July 2020  Zoster - declines July 2020     PSA testing discussion - age 55  AAA Screening - age 72 if smoked    Falls screening - n/a      Current Outpatient Medications   Medication Sig Dispense Refill    benzonatate (TESSALON PERLES) 100 MG capsule Take 1 to 2 tablets by mouth every 8 hours as needed for cough.  60 capsule 0    Ascorbic Acid (VITAMIN C) 1000 MG tablet Take 1 tablet by mouth daily 30 tablet 0    vitamin D (CHOLECALCIFEROL) 50 MCG (2000 UT) TABS tablet Take 1 tablet by mouth daily 30 tablet 0    zinc 50 MG CAPS Take 50 mg by mouth daily 30 capsule 0    aspirin 325 MG EC tablet Take 1 tablet by mouth daily for 7 days 30 tablet 0    atorvastatin (LIPITOR) 20 MG tablet TAKE 1 TABLET DAILY 90 tablet 3    lisinopril (PRINIVIL;ZESTRIL) 20 MG tablet TAKE 1 TABLET DAILY 90 tablet 3    BYSTOLIC 2.5 MG tablet TAKE 1 TABLET DAILY 90 tablet 0    fluticasone (FLONASE) 50 MCG/ACT nasal spray USE 1 SPRAY IN EACH NOSTRIL DAILY 16 g 5    albuterol sulfate HFA (VENTOLIN HFA) 108 (90 Base) MCG/ACT inhaler Inhale 2 puffs into the lungs 4 times daily as needed for Wheezing 1 Inhaler 0    amLODIPine (NORVASC) 10 MG tablet Take 1 tablet by mouth daily      aspirin EC 81 MG EC tablet Take 1 tablet by mouth daily 90 tablet 1     No current facility-administered medications for this visit. Orders Placed This Encounter   Medications    benzonatate (TESSALON PERLES) 100 MG capsule     Sig: Take 1 to 2 tablets by mouth every 8 hours as needed for cough. Dispense:  60 capsule     Refill:  0    Ascorbic Acid (VITAMIN C) 1000 MG tablet     Sig: Take 1 tablet by mouth daily     Dispense:  30 tablet     Refill:  0    vitamin D (CHOLECALCIFEROL) 50 MCG (2000 UT) TABS tablet     Sig: Take 1 tablet by mouth daily     Dispense:  30 tablet     Refill:  0    zinc 50 MG CAPS     Sig: Take 50 mg by mouth daily     Dispense:  30 capsule     Refill:  0         All medications reviewed and reconciled, including OTC and herbal medications. Updated list given to patient. Patient Active Problem List    Diagnosis Date Noted    Chest pain, atypical 12/07/2021    Coronary artery disease involving native coronary artery of native heart 12/07/2021    Palpitations one episode 12/07/2021    S/P cardiac cath 2015 mod LAD lesion 30 to 50%- med RX 01/22/2021    BAILEY (dyspnea on exertion) 01/22/2021    Left anterior cruciate ligament tear 11/09/2020    Tear of lateral meniscus of left knee, current 11/09/2020    Dysfunction of both eustachian tubes 07/09/2020    Right lateral epicondylitis 07/09/2020    Dyslipidemia     Former smoker     Heart failure with preserved ejection fraction (HCC)      Hx of prior LV dysfunction      Hypertension, essential     Mitral regurgitation     Nicotine dependence, chewing tobacco, uncomplicated     Obesity (BMI 30-39. 9)     Coronary artery disease involving native coronary artery of native heart without angina pectoris 78/36/4731    Diastolic murmur 00/80/0856    Family history of early CAD 12/07/2015    LVH (left ventricular hypertrophy) 12/07/2015       Past Medical History:   Diagnosis Date    ASHD (arteriosclerotic heart disease)     Dyslipidemia     Former smoker     GERD (gastroesophageal reflux disease)     Heart attack ([address]     Heart failure with preserved ejection fraction (HCC)     Hx of prior LV dysfunction    Hypertension, essential     Mitral regurgitation     Nicotine dependence, chewing tobacco, uncomplicated     Obesity (BMI 30-39. 9)     S/P coronary artery stent placement     S/P mitral valve repair     Torn meniscus          Past Surgical History:   Procedure Laterality Date    APPENDECTOMY      CARDIAC CATHETERIZATION      CORONARY ANGIOPLASTY      INGUINAL HERNIA REPAIR      INGUINAL HERNIA REPAIR Left 05/13/2016    KNEE SURGERY Left 02/2021    MITRAL VALVE REPAIR      ROTATOR CUFF REPAIR Right     right and left    VENTRAL HERNIA REPAIR  05/13/2016         No Known Allergies      Social History     Tobacco Use    Smoking status: Never Smoker    Smokeless tobacco: Former User     Types: Chew   Substance Use Topics    Alcohol use: Not Currently         Family History   Problem Relation Age of Onset    No Known Problems Mother     No Known Problems Father     Colon Cancer Neg Hx     Prostate Cancer Neg Hx          I have reviewed the patient's past medical history, past surgical history, allergies, medications, social and family history and I have made updates where appropriate.       Review of Systems  Positive responses are highlighted in bold    Constitutional:  Fever, Chills, Night Sweats, Fatigue, Unexpected changes in weight  HENT:  Ear pain, Tinnitus, Nosebleeds, Trouble swallowing, Hearing loss, Sore throat  Cardiovascular:  Chest Pain, Palpitations, Orthopnea, Paroxysmal Nocturnal Dyspnea  Respiratory:  Cough, Wheezing, Shortness of breath, Chest tightness, Apnea  Gastrointestinal:  Nausea, Vomiting, Diarrhea, Constipation, Heartburn, Blood in stool  Genitourinary:  Difficulty or painful urination, Flank pain, Change in frequency, Urgency  Skin:  Color change, Rash, Itching, Wound  Musculoskeletal:  Joint pain, Back pain, Gait problems, Joint swelling, Myalgias  Neurological:  Dizziness, Headaches, Presyncope, Numbness, Seizures, Tremors  Endocrine:  Heat Intolerance, Cold Intolerance, Polydipsia, Polyphagia, Polyuria      PHYSICAL EXAM:  Not all vitals able to be obtained, video visit  Patient-Reported Vitals 1/3/2022   Patient-Reported Pulse 70   Patient-Reported Temperature 98.6   Patient-Reported SpO2 96      Vitals:    01/03/22 1200   Resp: 14          General Appearance: A&O x 3, No acute distress,well developed and well- nourished  Head: normocephalic and atraumatic  Eyes: pupils equal, round, and reactive to light, extraocular eye movements intact, conjunctivae and eye lids without erythema  ENT: External ears w/o redness, external nares without redness or discharge. Hearing is intact. Lips are w/o lesion. Teeth are in good repair. Pulmonary/Chest: normal respiratory effort. Normal respiratory rate. No respiratory distress . Skin: warm and dry, no rash or erythema to visible areas. Psych: Affect appropriate. Mood euthymic. Thought process is normal without evidence of depression or psychosis. Good insight and appropriate interaction. Cognition and memory appear to be intact. ASSESSMENT & PLAN  1. COVID-19    + covid rapid antigen  Works requires a + PCR, will get that done today  Start Vit C/D and zinc  Tessalon  Monoclonal ATB, reviewed EUA  Reviewed ER precautions, pt understands. RF are HF, HTN, Obesity, age and male gener  con't asa, lipitor, bystolic, lisinopril and norvasc    - COVID-19; Future  - Influenza Antigen; Future  - benzonatate (TESSALON PERLES) 100 MG capsule; Take 1 to 2 tablets by mouth every 8 hours as needed for cough. Dispense: 60 capsule; Refill: 0  - Ascorbic Acid (VITAMIN C) 1000 MG tablet; Take 1 tablet by mouth daily  Dispense: 30 tablet; Refill: 0  - vitamin D (CHOLECALCIFEROL) 50 MCG (2000 UT) TABS tablet; Take 1 tablet by mouth daily  Dispense: 30 tablet; Refill: 0  - zinc 50 MG CAPS; Take 50 mg by mouth daily  Dispense: 30 capsule; Refill: 0    2. Chronic heart failure with preserved ejection fraction ([address]    As above    3. Hypertension, essential    As above    4. Obesity (BMI 30-39. 9)    As above      DISPOSITION    Return if symptoms worsen or fail to improve. Robbinlibia Rodriguez released without restrictions. Future Appointments   Date Time Provider Moiz Hernández   1/3/2022  4:20 PM Emir Jacob  [facility] [place]   1/6/2022  8:30 AM Lowanda Hatchet, MD N [facility] [place]   3/4/2022  8:00 AM Lowanda Hatchet, MD N [facility]     PATIENT COUNSELING    Barriers to learning and self management: none    Discussed use, benefit, and side effects of prescribed medications. Barriers to medication compliance addressed. All patient questions answered. Pt voiced understanding.        Electronically signed by Emir Jacob DO on 1/3/2022 at 12:05 PM

## 2022-01-03 NOTE — TELEPHONE ENCOUNTER
Pt called and stated that he took a at home test last night and tested positive for covid. Pt's employer requires the pt to get a test done from a doctor's office and will not accept a home test.     Pt has a headache, cough, shortness of breath, fever 102.5*F, body aches - started Yesterday     Pt would like to discuss getting the antibody infusion.      Future Appointments   Date Time Provider Moiz Hernández   1/3/2022  4:20 PM Carlene mE DO [facility] [facility] [place]   1/6/2022  8:30 AM MD LIBIA [facility] [place] [facility] [place]   3/4/2022  8:00 [facility] [place] [facility] [place]

## 2022-01-04 ENCOUNTER — TELEPHONE (OUTPATIENT)
Dept: FAMILY MEDICINE CLINIC | Age: 57
End: 2022-01-04

## 2022-01-04 ENCOUNTER — HOSPITAL ENCOUNTER (OUTPATIENT)
Dept: NURSING | Age: 57
Discharge: HOME | End: 2022-01-04
Payer: COMMERCIAL

## 2022-01-04 VITALS
DIASTOLIC BLOOD PRESSURE: 85 MMHG | HEART RATE: 61 BPM | RESPIRATION RATE: 16 BRPM | SYSTOLIC BLOOD PRESSURE: 158 MMHG | TEMPERATURE: 98.8 F | OXYGEN SATURATION: 97 %

## 2022-01-04 DIAGNOSIS — U07.1 COVID: ICD-10-CM

## 2022-01-04 DIAGNOSIS — U07.1 COVID: Primary | ICD-10-CM

## 2022-01-04 PROCEDURE — 2580000003 HC RX 258: Performed by: FAMILY MEDICINE

## 2022-01-04 PROCEDURE — 6360000002 HC RX W HCPCS: Performed by: FAMILY MEDICINE

## 2022-01-04 PROCEDURE — 2500000003 HC RX 250 WO HCPCS: Performed by: FAMILY MEDICINE

## 2022-01-04 PROCEDURE — M0245 HC IV INFUSION BAMLANIVIMAB & ETESEVIMAB W/MONITORING: HCPCS

## 2022-01-04 RX ORDER — SODIUM CHLORIDE 9 MG/ML
INJECTION, SOLUTION INTRAVENOUS CONTINUOUS
Status: CANCELLED | OUTPATIENT
Start: 2022-01-04

## 2022-01-04 RX ORDER — ALBUTEROL SULFATE 90 UG/1
4 AEROSOL, METERED RESPIRATORY (INHALATION) PRN
Status: CANCELLED | OUTPATIENT
Start: 2022-01-04

## 2022-01-04 RX ORDER — SODIUM CHLORIDE 9 MG/ML
25 INJECTION, SOLUTION INTRAVENOUS PRN
Status: CANCELLED | OUTPATIENT
Start: 2022-01-04

## 2022-01-04 RX ORDER — SODIUM CHLORIDE 0.9 % (FLUSH) 0.9 %
5-40 SYRINGE (ML) INJECTION PRN
Status: CANCELLED | OUTPATIENT
Start: 2022-01-04

## 2022-01-04 RX ORDER — HEPARIN SODIUM (PORCINE) LOCK FLUSH IV SOLN 100 UNIT/ML 100 UNIT/ML
500 SOLUTION INTRAVENOUS PRN
Status: DISCONTINUED | OUTPATIENT
Start: 2022-01-04 | End: 2022-01-05 | Stop reason: HOSPADM

## 2022-01-04 RX ORDER — DIPHENHYDRAMINE HYDROCHLORIDE 50 MG/ML
50 INJECTION INTRAMUSCULAR; INTRAVENOUS
Status: CANCELLED | OUTPATIENT
Start: 2022-01-04

## 2022-01-04 RX ORDER — EPINEPHRINE 1 MG/ML
0.3 INJECTION, SOLUTION, CONCENTRATE INTRAVENOUS PRN
Status: CANCELLED | OUTPATIENT
Start: 2022-01-04

## 2022-01-04 RX ORDER — ACETAMINOPHEN 325 MG/1
650 TABLET ORAL
Status: CANCELLED | OUTPATIENT
Start: 2022-01-04

## 2022-01-04 RX ORDER — ACETAMINOPHEN 325 MG/1
650 TABLET ORAL
Status: ACTIVE | OUTPATIENT
Start: 2022-01-04 | End: 2022-01-04

## 2022-01-04 RX ORDER — ONDANSETRON 2 MG/ML
8 INJECTION INTRAMUSCULAR; INTRAVENOUS
Status: CANCELLED | OUTPATIENT
Start: 2022-01-04

## 2022-01-04 RX ORDER — SODIUM CHLORIDE 0.9 % (FLUSH) 0.9 %
5-40 SYRINGE (ML) INJECTION PRN
Status: DISCONTINUED | OUTPATIENT
Start: 2022-01-04 | End: 2022-01-05 | Stop reason: HOSPADM

## 2022-01-04 RX ORDER — ONDANSETRON 2 MG/ML
8 INJECTION INTRAMUSCULAR; INTRAVENOUS
Status: ACTIVE | OUTPATIENT
Start: 2022-01-04 | End: 2022-01-04

## 2022-01-04 RX ORDER — SODIUM CHLORIDE 9 MG/ML
INJECTION, SOLUTION INTRAVENOUS CONTINUOUS
Status: DISCONTINUED | OUTPATIENT
Start: 2022-01-04 | End: 2022-01-05 | Stop reason: HOSPADM

## 2022-01-04 RX ORDER — HEPARIN SODIUM (PORCINE) LOCK FLUSH IV SOLN 100 UNIT/ML 100 UNIT/ML
500 SOLUTION INTRAVENOUS PRN
Status: CANCELLED | OUTPATIENT
Start: 2022-01-04

## 2022-01-04 RX ORDER — ALBUTEROL SULFATE 90 UG/1
4 AEROSOL, METERED RESPIRATORY (INHALATION) PRN
Status: DISCONTINUED | OUTPATIENT
Start: 2022-01-04 | End: 2022-01-05 | Stop reason: HOSPADM

## 2022-01-04 RX ORDER — DIPHENHYDRAMINE HYDROCHLORIDE 50 MG/ML
50 INJECTION INTRAMUSCULAR; INTRAVENOUS
Status: ACTIVE | OUTPATIENT
Start: 2022-01-04 | End: 2022-01-04

## 2022-01-04 RX ORDER — SODIUM CHLORIDE 9 MG/ML
25 INJECTION, SOLUTION INTRAVENOUS PRN
Status: DISCONTINUED | OUTPATIENT
Start: 2022-01-04 | End: 2022-01-05 | Stop reason: HOSPADM

## 2022-01-04 RX ADMIN — SODIUM CHLORIDE: 9 INJECTION, SOLUTION INTRAVENOUS at 13:54

## 2022-01-04 NOTE — TELEPHONE ENCOUNTER
----- Message from Josselyn Park sent at 1/3/2022  6:35 PM EST -----  Subject: Message to Provider    QUESTIONS  Information for Provider? pt was calling back to check on the status of   the antibody infusion requesting call back to confirm the details   ---------------------------------------------------------------------------  --------------  [address]  What is the best way for the office to contact you? OK to leave message on   voicemail, OK to respond with electronic message via [facility] portal (only   for patients who have registered [facility] account)  Preferred Call Back Phone Number? [phone]  ---------------------------------------------------------------------------  --------------  SCRIPT ANSWERS  Relationship to Patient?  Self

## 2022-01-04 NOTE — TELEPHONE ENCOUNTER
----- Message from Aileen Farrukh sent at 1/4/2022 12:24 PM EST -----  Subject: Message to Provider    QUESTIONS  Information for Provider? Patient calling to follow up regarding COVID   test results. Reports that PCP office told him they would email him   results and he has not received and needs to return to work.  ---------------------------------------------------------------------------  --------------  [address]  What is the best way for the office to contact you? OK to leave message on   voicemail  Preferred Call Back Phone Number? [phone]  ---------------------------------------------------------------------------  --------------  SCRIPT ANSWERS  Relationship to Patient?  Self

## 2022-01-04 NOTE — TELEPHONE ENCOUNTER
Pt states no SOB, 02 sat 98% on room air    I spoke to Marylou @ [address] [facility] [place] this am. Pt scheduled for infusion  Future Appointments   Date Time Provider Moiz Hernández   1/6/2022  8:30 AM Ken St MD [facility]   1/7/2022  9:00 AM STR EXAM ROOM [address]   3/4/2022  8:00 AM Ken St MD [facility]

## 2022-01-04 NOTE — TELEPHONE ENCOUNTER
Ok  Monitor sxs, call if worsens  ER if sxs signifantly worse or O2 drops <88%  Let me know if questions, thanks!

## 2022-01-04 NOTE — TELEPHONE ENCOUNTER
Pt informed and understanding. Pt states today is worse, he got a headache, started throwing up last night, body aches, and sore throart. Spoke to Diomedes Knapp at out patient pharmacy to get patient scheduled for the antibiotic infusion.

## 2022-01-04 NOTE — TELEPHONE ENCOUNTER
----- Message from Chet Tomas, DO sent at 1/4/2022  6:10 AM EST -----  Please let pt know that covid PCR test confirms his rapid antigen test  We were supposed to be getting setup for monoclonal ATB infusion. This needs setup ASAP today, thanks. How's pt doing today, let me know, thanks!

## 2022-01-04 NOTE — TELEPHONE ENCOUNTER
Verbal order for monoclonal abx given to Hawk Almendarez @ [facility] per Dr Menjivar Gains  she will call OP nursing and they will inform the patient

## 2022-01-07 ENCOUNTER — HOSPITAL ENCOUNTER (OUTPATIENT)
Dept: NURSING | Age: 57
Discharge: HOME | End: 2022-01-07

## 2022-01-19 ENCOUNTER — APPOINTMENT (OUTPATIENT)
Dept: CT IMAGING | Age: 57
End: 2022-01-19
Payer: COMMERCIAL

## 2022-01-19 ENCOUNTER — HOSPITAL ENCOUNTER (EMERGENCY)
Age: 57
Discharge: HOME | End: 2022-01-19
Attending: EMERGENCY MEDICINE
Payer: COMMERCIAL

## 2022-01-19 VITALS
SYSTOLIC BLOOD PRESSURE: 123 MMHG | HEART RATE: 54 BPM | RESPIRATION RATE: 16 BRPM | OXYGEN SATURATION: 96 % | DIASTOLIC BLOOD PRESSURE: 88 MMHG | TEMPERATURE: 97.7 F

## 2022-01-19 DIAGNOSIS — K40.90 RIGHT INGUINAL HERNIA: Primary | ICD-10-CM

## 2022-01-19 LAB
ALBUMIN SERPL-MCNC: 4 G/DL (ref 3.5–5.1)
ALP BLD-CCNC: 74 U/L (ref 38–126)
ALT SERPL-CCNC: 22 U/L (ref 11–66)
ANION GAP SERPL CALCULATED.3IONS-SCNC: 10 MEQ/L (ref 8–16)
AST SERPL-CCNC: 22 U/L (ref 5–40)
BASOPHILS # BLD: 0.6 %
BASOPHILS ABSOLUTE: 0 THOU/MM3 (ref 0–0.1)
BILIRUB SERPL-MCNC: 0.7 MG/DL (ref 0.3–1.2)
BILIRUBIN URINE: NEGATIVE
BLOOD, URINE: NEGATIVE
BUN BLDV-MCNC: 16 MG/DL (ref 7–22)
CALCIUM SERPL-MCNC: 9.7 MG/DL (ref 8.5–10.5)
CHARACTER, URINE: CLEAR
CHLORIDE BLD-SCNC: 101 MEQ/L (ref 98–111)
CO2: 25 MEQ/L (ref 23–33)
COLOR: YELLOW
CREAT SERPL-MCNC: 0.8 MG/DL (ref 0.4–1.2)
EKG ATRIAL RATE: 58 BPM
EKG P AXIS: 28 DEGREES
EKG P-R INTERVAL: 152 MS
EKG Q-T INTERVAL: 456 MS
EKG QRS DURATION: 78 MS
EKG QTC CALCULATION (BAZETT): 447 MS
EKG R AXIS: 5 DEGREES
EKG T AXIS: 22 DEGREES
EKG VENTRICULAR RATE: 58 BPM
EOSINOPHIL # BLD: 0.6 %
EOSINOPHILS ABSOLUTE: 0 THOU/MM3 (ref 0–0.4)
ERYTHROCYTE [DISTWIDTH] IN BLOOD BY AUTOMATED COUNT: 12 % (ref 11.5–14.5)
ERYTHROCYTE [DISTWIDTH] IN BLOOD BY AUTOMATED COUNT: 42.2 FL (ref 35–45)
GFR SERPL CREATININE-BSD FRML MDRD: > 90 ML/MIN/1.73M2
GLUCOSE BLD-MCNC: 108 MG/DL (ref 70–108)
GLUCOSE URINE: NEGATIVE MG/DL
HCT VFR BLD CALC: 45.4 % (ref 42–52)
HEMOGLOBIN: 14.9 GM/DL (ref 14–18)
IMMATURE GRANS (ABS): 0.05 THOU/MM3 (ref 0–0.07)
IMMATURE GRANULOCYTES: 0.8 %
KETONES, URINE: NEGATIVE
LEUKOCYTE ESTERASE, URINE: NEGATIVE
LIPASE: 40.5 U/L (ref 5.6–51.3)
LYMPHOCYTES # BLD: 19.6 %
LYMPHOCYTES ABSOLUTE: 1.3 THOU/MM3 (ref 1–4.8)
MCH RBC QN AUTO: 30.9 PG (ref 26–33)
MCHC RBC AUTO-ENTMCNC: 32.8 GM/DL (ref 32.2–35.5)
MCV RBC AUTO: 94.2 FL (ref 80–94)
MONOCYTES # BLD: 5.9 %
MONOCYTES ABSOLUTE: 0.4 THOU/MM3 (ref 0.4–1.3)
NITRITE, URINE: NEGATIVE
NUCLEATED RED BLOOD CELLS: 0 /100 WBC
OSMOLALITY CALCULATION: 273.7 MOSMOL/KG (ref 275–300)
PH UA: 7 (ref 5–9)
PLATELET # BLD: 327 THOU/MM3 (ref 130–400)
PMV BLD AUTO: 8.3 FL (ref 9.4–12.4)
POTASSIUM SERPL-SCNC: 5.3 MEQ/L (ref 3.5–5.2)
PROTEIN UA: NEGATIVE
RBC # BLD: 4.82 MILL/MM3 (ref 4.7–6.1)
SEG NEUTROPHILS: 72.5 %
SEGMENTED NEUTROPHILS ABSOLUTE COUNT: 4.8 THOU/MM3 (ref 1.8–7.7)
SODIUM BLD-SCNC: 136 MEQ/L (ref 135–145)
SPECIFIC GRAVITY, URINE: 1.01 (ref 1–1.03)
TOTAL PROTEIN: 7.1 G/DL (ref 6.1–8)
TROPONIN T: < 0.01 NG/ML
UROBILINOGEN, URINE: 1 EU/DL (ref 0–1)
WBC # BLD: 6.6 THOU/MM3 (ref 4.8–10.8)

## 2022-01-19 PROCEDURE — 96375 TX/PRO/DX INJ NEW DRUG ADDON: CPT

## 2022-01-19 PROCEDURE — 6360000002 HC RX W HCPCS: Performed by: EMERGENCY MEDICINE

## 2022-01-19 PROCEDURE — 99283 EMERGENCY DEPT VISIT LOW MDM: CPT

## 2022-01-19 PROCEDURE — 80053 COMPREHEN METABOLIC PANEL: CPT

## 2022-01-19 PROCEDURE — 81003 URINALYSIS AUTO W/O SCOPE: CPT

## 2022-01-19 PROCEDURE — 93005 ELECTROCARDIOGRAM TRACING: CPT | Performed by: EMERGENCY MEDICINE

## 2022-01-19 PROCEDURE — 96374 THER/PROPH/DIAG INJ IV PUSH: CPT

## 2022-01-19 PROCEDURE — 85025 COMPLETE CBC W/AUTO DIFF WBC: CPT

## 2022-01-19 PROCEDURE — 36415 COLL VENOUS BLD VENIPUNCTURE: CPT

## 2022-01-19 PROCEDURE — 74176 CT ABD & PELVIS W/O CONTRAST: CPT

## 2022-01-19 PROCEDURE — 84484 ASSAY OF TROPONIN QUANT: CPT

## 2022-01-19 PROCEDURE — 2580000003 HC RX 258: Performed by: EMERGENCY MEDICINE

## 2022-01-19 PROCEDURE — 93010 ELECTROCARDIOGRAM REPORT: CPT | Performed by: NUCLEAR MEDICINE

## 2022-01-19 PROCEDURE — 83690 ASSAY OF LIPASE: CPT

## 2022-01-19 RX ORDER — ONDANSETRON 2 MG/ML
4 INJECTION INTRAMUSCULAR; INTRAVENOUS ONCE
Status: COMPLETED | OUTPATIENT
Start: 2022-01-19 | End: 2022-01-19

## 2022-01-19 RX ORDER — KETOROLAC TROMETHAMINE 30 MG/ML
30 INJECTION, SOLUTION INTRAMUSCULAR; INTRAVENOUS ONCE
Status: COMPLETED | OUTPATIENT
Start: 2022-01-19 | End: 2022-01-19

## 2022-01-19 RX ORDER — SODIUM CHLORIDE 9 MG/ML
INJECTION, SOLUTION INTRAVENOUS CONTINUOUS
Status: DISCONTINUED | OUTPATIENT
Start: 2022-01-19 | End: 2022-01-19 | Stop reason: HOSPADM

## 2022-01-19 RX ORDER — MORPHINE SULFATE 4 MG/ML
4 INJECTION, SOLUTION INTRAMUSCULAR; INTRAVENOUS ONCE
Status: COMPLETED | OUTPATIENT
Start: 2022-01-19 | End: 2022-01-19

## 2022-01-19 RX ORDER — ONDANSETRON 4 MG/1
4 TABLET, ORALLY DISINTEGRATING ORAL EVERY 8 HOURS PRN
Qty: 15 TABLET | Refills: 0 | Status: SHIPPED | OUTPATIENT
Start: 2022-01-19 | End: 2022-01-20 | Stop reason: ALTCHOICE

## 2022-01-19 RX ORDER — KETOROLAC TROMETHAMINE 10 MG/1
10 TABLET, FILM COATED ORAL EVERY 6 HOURS PRN
Qty: 20 TABLET | Refills: 0 | Status: SHIPPED | OUTPATIENT
Start: 2022-01-19 | End: 2022-01-20 | Stop reason: ALTCHOICE

## 2022-01-19 RX ORDER — 0.9 % SODIUM CHLORIDE 0.9 %
1000 INTRAVENOUS SOLUTION INTRAVENOUS ONCE
Status: COMPLETED | OUTPATIENT
Start: 2022-01-19 | End: 2022-01-19

## 2022-01-19 RX ADMIN — ONDANSETRON 4 MG: 2 INJECTION INTRAMUSCULAR; INTRAVENOUS at 12:38

## 2022-01-19 RX ADMIN — KETOROLAC TROMETHAMINE 30 MG: 30 INJECTION, SOLUTION INTRAMUSCULAR; INTRAVENOUS at 12:38

## 2022-01-19 RX ADMIN — MORPHINE SULFATE 4 MG: 4 INJECTION, SOLUTION INTRAMUSCULAR; INTRAVENOUS at 12:38

## 2022-01-19 RX ADMIN — SODIUM CHLORIDE 1000 ML: 9 INJECTION, SOLUTION INTRAVENOUS at 12:38

## 2022-01-19 ASSESSMENT — PAIN DESCRIPTION - PAIN TYPE: TYPE: ACUTE PAIN

## 2022-01-19 ASSESSMENT — PAIN SCALES - GENERAL
PAINLEVEL_OUTOF10: 6
PAINLEVEL_OUTOF10: 8

## 2022-01-19 NOTE — ED NOTES
ED nurse-to-nurse bedside report    Chief Complaint   Patient presents with    Abdominal Pain      LOC: alert and orientated to name, place, date  Vital signs   Vitals:    01/19/22 1136 01/19/22 1249   BP: (!) 138/93 123/88   Pulse: 61 55   Resp: 18 18   Temp: 97.7 °F (36.5 °C)    TempSrc: Oral    SpO2: 98% 96%      Pain:    Pain Interventions: Morphine and Toradol  Pain Goal: 2  Oxygen: No    Current needs required ra   Telemetry: Yes  LDAs:   Peripheral IV 01/04/22 Right Forearm (Active)     Continuous Infusions:    sodium chloride       Mobility: Independent  Valero Fall Risk Score: No flowsheet data found.   Fall Interventions: side rails and call light  Report given to: Reagan Herzog RN  01/19/22 1257

## 2022-01-19 NOTE — ED NOTES
Pt ambulated to the restroom to obtain urine sample. Pt states pain is better after medication.      Benson Pedroza RN  01/19/22 4853

## 2022-01-19 NOTE — ED PROVIDER NOTES
[address] ENCOUNTER      PATIENT NAME: Nydia Barry  MRN: [id number]  : 1965  BAILEY: 2022  PROVIDER: Iesha Davis MD      CHIEF COMPLAINT       Chief Complaint   Patient presents with    Abdominal Pain       Patient is seen and evaluated in a timely fashion. Nurses Notes are reviewed and I agree except as noted in the HPI. HISTORY OF PRESENT ILLNESS    Nydia Barry is a 55 y.o. male who presents to Emergency Department with Abdominal Pain     A 45-year-old male with past medical history of CAD with previous PCI, hyperlipidemia, former smoker, GERD, MI, CHF, mitral regurgitation, status post mitral valve replacement and prior inguinal hernia repair 4-5 years ago at [facility] presents to ED for evaluation of RLQ pain since 7 AM today. He was tested positive for COVID-19 and 1/3/2022. He has been coughing after that. He suddenly felt burning sensation to right groin this morning around 7 AM at work and he describes the pain was similar to years ago when he had a inguinal hernia. Pain at 6/10. He has mild nausea, no vomiting, no bowel movements after pain started. No hematuria. No dysuria. No fever, no chills, no chest pain. He has mild right flank pain also. This HPI was provided by patient.      REVIEW OF SYSTEMS   Ten-point review of systems is negative except those documented in above HPI including constitutional, HEENT, respiratory, cardiovascular, gastrointestinal, genitourinary, musculoskeletal, skin, neurological, hematological and behavioral.      PAST MEDICAL HISTORY     Past Medical History:   Diagnosis Date    ASHD (arteriosclerotic heart disease)    [facility] Dyslipidemia     Former smoker     GERD (gastroesophageal reflux disease)     Heart attack ([facility] [address]     Heart failure with preserved ejection fraction (HCC)     Hx of prior LV dysfunction    Hypertension, essential     Mitral regurgitation     Nicotine dependence, chewing tobacco, uncomplicated     Obesity (BMI 30-39. 9)     S/P coronary artery stent placement     S/P mitral valve repair     Torn meniscus        SURGICAL HISTORY       Past Surgical History:   Procedure Laterality Date    APPENDECTOMY      CARDIAC CATHETERIZATION      CORONARY ANGIOPLASTY      INGUINAL HERNIA REPAIR      INGUINAL HERNIA REPAIR Left 2016    KNEE SURGERY Left 2021    MITRAL VALVE REPAIR      ROTATOR CUFF REPAIR Right     right and left    VENTRAL HERNIA REPAIR  2016       CURRENT MEDICATIONS       Previous Medications    ALBUTEROL SULFATE HFA (VENTOLIN HFA) 108 (90 BASE) MCG/ACT INHALER    Inhale 2 puffs into the lungs 4 times daily as needed for Wheezing    AMLODIPINE (NORVASC) 10 MG TABLET    Take 1 tablet by mouth daily    ASCORBIC ACID (VITAMIN C) 1000 MG TABLET    Take 1 tablet by mouth daily    ASPIRIN 325 MG EC TABLET    Take 1 tablet by mouth daily for 7 days    ASPIRIN EC 81 MG EC TABLET    Take 1 tablet by mouth daily    ATORVASTATIN (LIPITOR) 20 MG TABLET    TAKE 1 TABLET DAILY    BYSTOLIC 2.5 MG TABLET    TAKE 1 TABLET DAILY    FLUTICASONE (FLONASE) 50 MCG/ACT NASAL SPRAY    USE 1 SPRAY IN EACH NOSTRIL DAILY    LISINOPRIL (PRINIVIL;ZESTRIL) 20 MG TABLET    TAKE 1 TABLET DAILY    VITAMIN D (CHOLECALCIFEROL) 50 MCG (2000 UT) TABS TABLET    Take 1 tablet by mouth daily    ZINC 50 MG CAPS    Take 50 mg by mouth daily       ALLERGIES     Patient has no known allergies. FAMILY HISTORY     He indicated that his mother is . He indicated that his father is alive. He indicated that the status of his neg hx is unknown.   family history includes No Known Problems in his father and mother. SOCIAL HISTORY      reports that he has never smoked. He has quit using smokeless tobacco.  His smokeless tobacco use included chew. He reports previous alcohol use. He reports that he does not use drugs. PHYSICAL EXAM      oral temperature is 97.7 °F (36.5 °C).  His blood pressure is 123/88 and his pulse is 54. His respiration is 16 and oxygen saturation is 96%. Physical Exam  Vitals and nursing note reviewed. Constitutional:       Appearance: He is well-developed. He is not diaphoretic. HENT:      Head: Normocephalic and atraumatic. Nose: Nose normal.   Eyes:      General: No scleral icterus. Right eye: No discharge. Left eye: No discharge. Conjunctiva/sclera: Conjunctivae normal.      Pupils: Pupils are equal, round, and reactive to light. Neck:      Vascular: No JVD. Trachea: No tracheal deviation. Cardiovascular:      Rate and Rhythm: Normal rate and regular rhythm. Heart sounds: Normal heart sounds. No murmur heard. No friction rub. No gallop. Pulmonary:      Effort: Pulmonary effort is normal. No respiratory distress. Breath sounds: Normal breath sounds. No stridor. No wheezing or rales. Chest:      Chest wall: No tenderness. Abdominal:      General: Bowel sounds are normal. There is no distension. Palpations: Abdomen is soft. There is no mass. Tenderness: There is abdominal tenderness in the right lower quadrant. There is no guarding or rebound. Hernia: No hernia is present. Genitourinary:     Comments: Right CVA tenderness. Musculoskeletal:         General: No tenderness or deformity. Cervical back: Normal range of motion and neck supple. Lymphadenopathy:      Cervical: No cervical adenopathy. Skin:     General: Skin is warm and dry. Capillary Refill: Capillary refill takes less than 2 seconds. Coloration: Skin is not pale. Findings: No erythema or rash. Neurological:      Mental Status: He is alert and oriented to person, place, and time. Cranial Nerves: No cranial nerve deficit. Sensory: No sensory deficit. Motor: No abnormal muscle tone.       Coordination: Coordination normal.      Deep Tendon Reflexes: Reflexes normal.   Psychiatric:         Behavior: Behavior normal.         Thought Content: Thought content normal.         Judgment: Judgment normal.       ANCILLARY TEST RESULTS   EKG:    Interpreted by me  Sinus bradycardia  Ventricular rate 58 bpm  NJ interval 152 ms  QRS duration 78 ms   ms  No ST elevation or acute T wave  Borderline ECG    LAB RESULTS:  Results for orders placed or performed during the hospital encounter of 01/19/22   CBC Auto Differential   Result Value Ref Range    WBC 6.6 4.8 - 10.8 thou/mm3    RBC 4.82 4.70 - 6.10 mill/mm3    Hemoglobin 14.9 14.0 - 18.0 gm/dl    Hematocrit 45.4 42.0 - 52.0 %    MCV 94.2 (H) 80.0 - 94.0 fL    MCH 30.9 26.0 - 33.0 pg    MCHC 32.8 32.2 - 35.5 gm/dl    RDW-CV 12.0 11.5 - 14.5 %    RDW-SD 42.2 35.0 - 45.0 fL    Platelets 576 404 - 220 thou/mm3    MPV 8.3 (L) 9.4 - 12.4 fL    Seg Neutrophils 72.5 %    Lymphocytes 19.6 %    Monocytes 5.9 %    Eosinophils 0.6 %    Basophils 0.6 %    Immature Granulocytes 0.8 %    Segs Absolute 4.8 1.8 - 7.7 thou/mm3    Lymphocytes Absolute 1.3 1.0 - 4.8 thou/mm3    Monocytes Absolute 0.4 0.4 - 1.3 thou/mm3    Eosinophils Absolute 0.0 0.0 - 0.4 thou/mm3    Basophils Absolute 0.0 0.0 - 0.1 thou/mm3    Immature Grans (Abs) 0.05 0.00 - 0.07 thou/mm3    nRBC 0 /100 wbc   Comprehensive Metabolic Panel   Result Value Ref Range    Glucose 108 70 - 108 mg/dL    CREATININE 0.8 0.4 - 1.2 mg/dL    BUN 16 7 - 22 mg/dL    Sodium 136 135 - 145 meq/L    Potassium 5.3 (H) 3.5 - 5.2 meq/L    Chloride 101 98 - 111 meq/L    CO2 25 23 - 33 meq/L    Calcium 9.7 8.5 - 10.5 mg/dL    AST 22 5 - 40 U/L    Alkaline Phosphatase 74 38 - 126 U/L    Total Protein 7.1 6.1 - 8.0 g/dL    Albumin 4.0 3.5 - 5.1 g/dL    Total Bilirubin 0.7 0.3 - 1.2 mg/dL    ALT 22 11 - 66 U/L   Lipase   Result Value Ref Range    Lipase 40.5 5.6 - 51.3 U/L   Troponin   Result Value Ref Range    Troponin T < 0.010 ng/ml   Anion Gap   Result Value Ref Range    Anion Gap 10.0 8.0 - 16.0 meq/L   Osmolality   Result Value Ref Range Osmolality Calc 273.7 (L) 275.0 - 300.0 mOsmol/kg   Glomerular Filtration Rate, Estimated   Result Value Ref Range    Est, Glom Filt Rate >90 ml/min/1.73m2   EKG Emergency   Result Value Ref Range    Ventricular Rate 58 BPM    Atrial Rate 58 BPM    P-R Interval 152 ms    QRS Duration 78 ms    Q-T Interval 456 ms    QTc Calculation (Bazett) 447 ms    P Axis 28 degrees    R Axis 5 degrees    T Axis 22 degrees       RADIOLOGY REPORTS  CT ABDOMEN PELVIS WO CONTRAST Additional Contrast? None   Final Result   1. No acute bowel obstruction or acute inflammatory bowel process   2. Patchy peripheral consolidative opacities are seen in the lung bases and the right middle lobe. Findings relate to an infectious process. Clinical correlation is recommended. 3. Colonic diverticulosis. 4. Other incidental findings as described above. **This report has been created using voice recognition software. It may contain minor errors which are inherent in voice recognition technology. **      Final report electronically signed by Dr Casie Velazquez on 1/19/2022 1:35 PM          ED COURSE AND MEDICAL [id number] ([facility])     This is a 69-year-old male presenting to ED for evaluation of right lower quadrant pain. Sudden onset after he coughed. Differential diagnosis: Renal colic, acute appendicitis, inguinal hernia, UTI, musculoskeletal pain, SBO, hydrocele    Pain is better on reassessment. Labs and CT images as reviewed. I disagree with the radiologist reading, patient has a small right inguinal hernia with a small piece fat incarcerated. However there is no bowel content and no bowel blockage. Patient is reassured and discharged in stable situations. Referral to see a general surgery is made. He is prescribed Toradol and Zofran. Off work for 3 days. Light duty for next week until he is seen by our general surgery.       Medications   0.9 % sodium chloride bolus (has no administration in time range)   0.9 % sodium chloride infusion (has no administration in time range)   ketorolac (TORADOL) injection 30 mg (has no administration in time range)   morphine injection 4 mg (has no administration in time range)   ondansetron (ZOFRAN) injection 4 mg (has no administration in time range)       Vital signs in ED  Vitals:    01/19/22 1136   BP: (!) 138/93   Pulse: 61   Resp: 18   Temp: 97.7 °F (36.5 °C)   TempSrc: Oral   SpO2: 98%         CRITICAL CARE   None    CONSULTS   None    PROCEDURES   None    FINAL IMPRESSION AND DISPOSITION      1.  Right inguinal hernia        DISPOSITION Decision To Discharge 01/19/2022 01:51:09 PM      PATIENT REFERRED TO:  Dorinda Day MD  [address]  [place] [address]  [phone]    In 1 week  ED discharge follow-up for right inguinal hernia and right lower quadrant pain      DISCHARGE MEDICATIONS:  New Prescriptions    KETOROLAC (TORADOL) 10 MG TABLET    Take 1 tablet by mouth every 6 hours as needed for Pain    ONDANSETRON (ZOFRAN ODT) 4 MG DISINTEGRATING TABLET    Take 1 tablet by mouth every 8 hours as needed for Nausea or Vomiting       (Please note that portions of this note were completed with a voice recognition program.  Efforts were made to edit the dictations but occasionally words aremis-transcribed.)    MD Ministerio Bustos MD  01/19/22 9386

## 2022-01-19 NOTE — ED NOTES
Bed: 029A  Expected date:   Expected time:   Means of arrival: Ambulance  Comments:  Shruthi Brasher RN  01/19/22 3235

## 2022-01-19 NOTE — Clinical Note
Dario Sánchez was seen and treated in our emergency department on 1/19/2022. He may return to work on 01/22/2022. Light duty until patient is seen by a general surgeon. If you have any questions or concerns, please don't hesitate to call.       Sheila iMtchell MD

## 2022-01-19 NOTE — ED NOTES
Patient in bed lying on side with eyes closed. Patient appears to be resting at this time. Patient respirations are regular and unlabored. Patient vital signs are stable and respirations are noted. Will continue to monitor patient and call light placed within patients reach.        Razia Lopez RN  01/19/22 7468

## 2022-01-20 ENCOUNTER — OFFICE VISIT (OUTPATIENT)
Dept: SURGERY | Age: 57
End: 2022-01-20
Payer: COMMERCIAL

## 2022-01-20 VITALS
DIASTOLIC BLOOD PRESSURE: 88 MMHG | SYSTOLIC BLOOD PRESSURE: 132 MMHG | WEIGHT: 204.2 LBS | HEART RATE: 75 BPM | OXYGEN SATURATION: 98 % | BODY MASS INDEX: 29.23 KG/M2 | RESPIRATION RATE: 18 BRPM | HEIGHT: 70 IN | TEMPERATURE: 97.6 F

## 2022-01-20 DIAGNOSIS — K40.90 RIGHT INGUINAL HERNIA: Primary | ICD-10-CM

## 2022-01-20 PROCEDURE — 99202 OFFICE O/P NEW SF 15 MIN: CPT | Performed by: SURGERY

## 2022-01-20 RX ORDER — M-VIT,TX,IRON,MINS/CALC/FOLIC 27MG-0.4MG
1 TABLET ORAL DAILY
COMMUNITY

## 2022-01-20 ASSESSMENT — ENCOUNTER SYMPTOMS
SHORTNESS OF BREATH: 0
SINUS PAIN: 0
CHOKING: 0
EYE PAIN: 0
EYES NEGATIVE: 1
BACK PAIN: 0
EYE DISCHARGE: 0
RHINORRHEA: 0
CHEST TIGHTNESS: 0
STRIDOR: 0
FACIAL SWELLING: 0
WHEEZING: 0
APNEA: 0
COUGH: 0
COLOR CHANGE: 0
ALLERGIC/IMMUNOLOGIC NEGATIVE: 1
EYE ITCHING: 0
ABDOMINAL PAIN: 1
EYE REDNESS: 0
SINUS PRESSURE: 0
PHOTOPHOBIA: 0
SORE THROAT: 0
VOICE CHANGE: 0
TROUBLE SWALLOWING: 0
RESPIRATORY NEGATIVE: 1

## 2022-01-20 NOTE — PROGRESS NOTES
[address] [facility] [address]  Dept: [phone]  Dept Fax: [phone]  Loc: [phone]    Visit Date: 1/20/2022    Naveed Brown is a 55 y.o. male who presentstoday for:  Chief Complaint   Patient presents with    Surgical Consult     New patient-- [facility] emergency room follow up 1/19-- right inguinal hernia       HPI:     HPI 52-year-old white male who works for [employer] here in [place] present here for 3 years he has a history of having had a left inguinal hernia repaired with a ventral hernia repaired primarily at the same time in May 2016 in [place]. Patient recently had [facility] had some severe coughing and had onset of right groin pain the patient went to the emergency room yesterday because the pain was increased in severity and apparently had a right inguinal hernia reduced per ER physician and then had a CT scan confirming a right inguinal hernia he has been referred for possible repair of same patient believes that hernia is relatively recent onset    Past Medical History:   Diagnosis Date    ASHD (arteriosclerotic heart disease)    Violet Person Dyslipidemia     Former smoker     GERD (gastroesophageal reflux disease)     Heart attack ([address]     Heart failure with preserved ejection fraction ([address]     Hx of prior LV dysfunction    Hypertension, essential     Mitral regurgitation     Nicotine dependence, chewing tobacco, uncomplicated     Obesity (BMI 30-39. 9)     S/P coronary artery stent placement     S/P mitral valve repair     Torn meniscus       Past Surgical History:   Procedure Laterality Date    APPENDECTOMY      CARDIAC CATHETERIZATION      COLONOSCOPY  2017    [place]-- medical records could not send anything over 6years old   Gaby Kapoor Left 05/13/2016    Tanya Patient, [facility] SURGERY Left 02/2021    MITRAL VALVE REPAIR      ROTATOR CUFF REPAIR Right     right and left    VENTRAL HERNIA REPAIR  05/13/2016    Brittany Cabrera        Family History   Problem Relation Age of Onset    Diabetes Mother     Mitral Valve Prolapse Mother     Heart Attack Mother     Obesity Mother     No Known Problems Father     No Known Problems Sister     No Known Problems Sister     Thyroid Disease Sister     Thyroid Disease Sister     No Known Problems Brother     No Known Problems Maternal Grandmother     No Known Problems Maternal Grandfather     No Known Problems Paternal Grandmother     Alzheimer's Disease Paternal Grandfather     Colon Cancer Neg Hx     Prostate Cancer Neg Hx         Social History     Tobacco Use    Smoking status: Never Smoker    Smokeless tobacco: Current User     Types: Chew   Substance Use Topics    Alcohol use: Yes     Comment: socially          No current facility-administered medications for this visit. No current outpatient medications on file.      Facility-Administered Medications Ordered in Other Visits   Medication Dose Route Frequency Provider Last Rate Last Admin    0.9 % sodium chloride infusion   IntraVENous Continuous Crystal Marcela, LPN 10 mL/hr at 76/79/86 1145 Restarted at 02/04/22 1229    ceFAZolin (ANCEF) 2000 mg in dextrose 5 % 50 mL IVPB  2,000 mg IntraVENous 30 Min Pre-Op Ajay Florian RPH   2,000 mg at 02/04/22 1245    meperidine (DEMEROL) injection 12.5 mg  12.5 mg IntraVENous Q5 Min PRN Yissel Sams MD        fentaNYL (SUBLIMAZE) injection 25 mcg  25 mcg IntraVENous Q5 Min PRN Yissel Sams MD        fentaNYL (SUBLIMAZE) injection 50 mcg  50 mcg IntraVENous Q5 Min PRN Yissel Sams MD        HYDROmorphone (DILAUDID) injection 0.25 mg  0.25 mg IntraVENous Q5 Min PRN Yissel Sams MD        HYDROmorphone (DILAUDID) injection 0.5 mg  0.5 mg IntraVENous Q5 Min PRN Yissel Sams MD        [facility] injection 12.5 mg  12.5 mg IntraMUSCular Once PRN Kemar Agrawal Bacilio Crabtree MD        metoclopramide [facility]) injection 10 mg  10 mg IntraVENous Once PRN Zahira Cárdenas MD        diphenhydrAMINE (BENADRYL) injection 12.5 mg  12.5 mg IntraVENous Once PRN Zahira Cárdenas MD        labetalol (NORMODYNE;TRANDATE) injection syringe 5 mg  5 mg IntraVENous Q10 Min PRN Zahira Cárdenas MD        midazolam (VERSED) injection   IntraVENous PRN Milderd [place], APRN - CRNA   2 mg at 02/04/22 1233    fentaNYL (SUBLIMAZE) injection   IntraVENous PRN Milderd [place], APRN - CRNA   100 mcg at 02/04/22 1233    lidocaine injection   IntraVENous PRN Milderd [place], APRN - CRNA   80 mg at 02/04/22 1236    rocuronium (ZEMURON) injection   IntraVENous PRN Milderd [place], APRN - CRNA   10 mg at 02/04/22 1323    propofol injection   IntraVENous PRN Milderd [place], APRN - CRNA   200 mg at 02/04/22 1237    dexamethasone (PF) (DECADRON) injection   IntraVENous PRN Milderd [place], APRN - CRNA   10 mg at 02/04/22 1246     No Known Allergies    Subjective:      Review of Systems   Constitutional: Positive for appetite change. Negative for activity change, chills, diaphoresis, fatigue, fever and unexpected weight change. HENT: Negative. Negative for congestion, dental problem, drooling, ear discharge, ear pain, facial swelling, hearing loss, mouth sores, nosebleeds, postnasal drip, rhinorrhea, sinus pressure, sinus pain, sneezing, sore throat, tinnitus, trouble swallowing and voice change. Eyes: Negative. Negative for photophobia, pain, discharge, redness, itching and visual disturbance. Respiratory: Negative. Negative for apnea, cough, choking, chest tightness, shortness of breath, wheezing and stridor. Cardiovascular: Negative. Negative for chest pain, palpitations and leg swelling. Gastrointestinal: Positive for abdominal pain. Endocrine: Negative. Negative for cold intolerance, heat intolerance, polydipsia, polyphagia and polyuria. Genitourinary: Negative.   Negative for decreased urine volume, difficulty urinating, dysuria, enuresis, flank pain, frequency, genital sores, hematuria, penile discharge, penile pain, penile swelling, scrotal swelling, testicular pain and urgency. Musculoskeletal: Negative. Negative for arthralgias, back pain, gait problem, joint swelling, myalgias, neck pain and neck stiffness. Skin: Negative. Negative for color change, pallor, rash and wound. Allergic/Immunologic: Negative. Negative for environmental allergies, food allergies and immunocompromised state. Neurological: Negative. Negative for dizziness, tremors, seizures, syncope, facial asymmetry, speech difficulty, weakness, light-headedness, numbness and headaches. Hematological: Negative. Negative for adenopathy. Does not bruise/bleed easily. Psychiatric/Behavioral: Negative for agitation, behavioral problems, confusion, decreased concentration, dysphoric mood, hallucinations, self-injury, sleep disturbance and suicidal ideas. The patient is not nervous/anxious and is not hyperactive. Objective:   /88 (Site: Left Upper Arm, Position: Sitting, Cuff Size: Medium Adult)   Pulse 75   Temp 97.6 °F (36.4 °C) (Temporal)   Resp 18   Ht 5' 10\" (1.778 m)   Wt 204 lb 3.2 oz (92.6 kg)   SpO2 98%   BMI 29.30 kg/m²     Physical Exam  Constitutional:       Appearance: He is well-developed. HENT:      Head: Normocephalic and atraumatic. Eyes:      General: No scleral icterus. Left eye: No discharge. Conjunctiva/sclera: Conjunctivae normal.      Pupils: Pupils are equal, round, and reactive to light. Neck:      Thyroid: No thyromegaly. Trachea: No tracheal deviation. Cardiovascular:      Rate and Rhythm: Normal rate and regular rhythm. Heart sounds: Normal heart sounds. No murmur heard. No friction rub. No gallop. Pulmonary:      Effort: Pulmonary effort is normal. No respiratory distress. Breath sounds: Normal breath sounds. No wheezing or rales. Chest:      Chest wall: No tenderness. Abdominal:       Musculoskeletal:         General: No tenderness. Normal range of motion. Lymphadenopathy:      Cervical: No cervical adenopathy. Skin:     General: Skin is warm and dry. Coloration: Skin is not pale. Findings: No erythema or rash. Neurological:      Mental Status: He is alert and oriented to person, place, and time. Psychiatric:         Behavior: Behavior normal.         Thought Content:  Thought content normal.         Judgment: Judgment normal.            Results for orders placed or performed during the hospital encounter of 01/19/22   CBC Auto Differential   Result Value Ref Range    WBC 6.6 4.8 - 10.8 thou/mm3    RBC 4.82 4.70 - 6.10 mill/mm3    Hemoglobin 14.9 14.0 - 18.0 gm/dl    Hematocrit 45.4 42.0 - 52.0 %    MCV 94.2 (H) 80.0 - 94.0 fL    MCH 30.9 26.0 - 33.0 pg    MCHC 32.8 32.2 - 35.5 gm/dl    RDW-CV 12.0 11.5 - 14.5 %    RDW-SD 42.2 35.0 - 45.0 fL    Platelets 604 618 - 447 thou/mm3    MPV 8.3 (L) 9.4 - 12.4 fL    Seg Neutrophils 72.5 %    Lymphocytes 19.6 %    Monocytes 5.9 %    Eosinophils 0.6 %    Basophils 0.6 %    Immature Granulocytes 0.8 %    Segs Absolute 4.8 1.8 - 7.7 thou/mm3    Lymphocytes Absolute 1.3 1.0 - 4.8 thou/mm3    Monocytes Absolute 0.4 0.4 - 1.3 thou/mm3    Eosinophils Absolute 0.0 0.0 - 0.4 thou/mm3    Basophils Absolute 0.0 0.0 - 0.1 thou/mm3    Immature Grans (Abs) 0.05 0.00 - 0.07 thou/mm3    nRBC 0 /100 wbc   Comprehensive Metabolic Panel   Result Value Ref Range    Glucose 108 70 - 108 mg/dL    CREATININE 0.8 0.4 - 1.2 mg/dL    BUN 16 7 - 22 mg/dL    Sodium 136 135 - 145 meq/L    Potassium 5.3 (H) 3.5 - 5.2 meq/L    Chloride 101 98 - 111 meq/L    CO2 25 23 - 33 meq/L    Calcium 9.7 8.5 - 10.5 mg/dL    AST 22 5 - 40 U/L    Alkaline Phosphatase 74 38 - 126 U/L    Total Protein 7.1 6.1 - 8.0 g/dL    Albumin 4.0 3.5 - 5.1 g/dL    Total Bilirubin 0.7 0.3 - 1.2 mg/dL    ALT 22 11 - 66 U/L   Lipase Result Value Ref Range    Lipase 40.5 5.6 - 51.3 U/L   Urine reflex to culture    Specimen: Urine, clean catch   Result Value Ref Range    Glucose, Ur NEGATIVE NEGATIVE mg/dl    Bilirubin Urine NEGATIVE NEGATIVE    Ketones, Urine NEGATIVE NEGATIVE    Specific Gravity, Urine 1.011 1.002 - 1.030    Blood, Urine NEGATIVE NEGATIVE    pH, UA 7.0 5.0 - 9.0    Protein, UA NEGATIVE NEGATIVE    Urobilinogen, Urine 1.0 0.0 - 1.0 eu/dl    Nitrite, Urine NEGATIVE NEGATIVE    Leukocyte Esterase, Urine NEGATIVE NEGATIVE    Color, UA YELLOW STRAW-YELLOW    Character, Urine CLEAR CLEAR-SL CLOUD   Troponin   Result Value Ref Range    Troponin T < 0.010 ng/ml   Anion Gap   Result Value Ref Range    Anion Gap 10.0 8.0 - 16.0 meq/L   Osmolality   Result Value Ref Range    Osmolality Calc 273.7 (L) 275.0 - 300.0 mOsmol/kg   Glomerular Filtration Rate, Estimated   Result Value Ref Range    Est, Glom Filt Rate >90 ml/min/1.73m2   EKG Emergency   Result Value Ref Range    Ventricular Rate 58 BPM    Atrial Rate 58 BPM    P-R Interval 152 ms    QRS Duration 78 ms    Q-T Interval 456 ms    QTc Calculation (Bazett) 447 ms    P Axis 28 degrees    R Axis 5 degrees    T Axis 22 degrees       Assessment:     It inguinal hernia with a history of having had a left inguinal hernia and umbilical hernia repaired at that same time that was done via a laparoscopic Tepp procedure the umbilical hernia was repaired primarily with mesh only used with the left inguinal hernia repair patient actually thought he had had a right inguinal hernia repaired at that time but medical record operative note insinuates otherwise we discussed the robotic repair of hernias and the use of mesh he voices understanding and would like to proceed    Plan:     1. Schedule Joel for biotic assisted repair of right inguinal hernia  2. The risks, benefits and alternatives were discussed with Rick Contreras. He understands and wishes to proceed with surgical intervention.   3. Restrictions discussed with Aracelis Miller and he expresses understanding. 4. He is advised to call back directly if there are further questions/concerns, or if his symptoms worsen prior to surgery.             Electronicallysigned by Moncho Hernandez MD on 2/4/2022 at 1:32 PM

## 2022-01-21 ENCOUNTER — TELEPHONE (OUTPATIENT)
Dept: SURGERY | Age: 57
End: 2022-01-21

## 2022-01-21 NOTE — TELEPHONE ENCOUNTER
Per [facility] website Precertification reviews are not required for outpatient services, short stay/observation care, routine maternity including , Traditional Medicare contracts and where [employer] is the secondary insurer. Please see Medical Policy and Pre-Cert/Pre-Auth router for other services.

## 2022-01-21 NOTE — TELEPHONE ENCOUNTER
Patient scheduled for surgery with Dr. Alfredo Mcmahan on 02- for a robotic assisted right inguinal hernia with mesh placement possible open, possible left inguinal hernia under a general anesthetic. Could you please ask Dr. Jose R Dunham if the patient is okay to proceed with surgery?   Thank you

## 2022-01-21 NOTE — TELEPHONE ENCOUNTER
Robotic Surgery Scheduling Form   [facility] [address]    Phone * [phone]   Surgical Scheduling Direct line Phone * [phone]  Fax * [phone]      Pratima Deleon      1965    male    Katerine Rascon 89  [address] [facility]   Marital Status:         Home Phone: [phone]   Cell Phone:   Telephone Information:   Mobile [phone]              Surgeon: Dr. Dax Santana   Surgery Date:02-   Time: TF Kat     Procedure: Robotic assisted right inguinal hernia repair with mesh possible open possible left inguinal hernia    Outpatient     Diagnosis: Right inguinal hernia    Important Medical History: In Epic    Special Inst/Equip:     CPT Codes: 17758    Latex Allergy:   no Cardiac Device:  no    Anesthesia Type: General    Case Location:  Main OR     Preadmission Testing: Phone Call      PAT Date and Time: ________________________________    PAT Confirmation #: _________________________________    Post Op Visit:  ______________________________________    Need Preop Cardiac Clearance:   yes,     Does patient have Cardiologist/physician? Dr. Tatyana Murray #:  ______________________________________________    Jerry Greening: __________________________________ Date:____________________      RNFA (colon resection only):       Insurance Company Name:  [facility]

## 2022-01-21 NOTE — TELEPHONE ENCOUNTER
Patient scheduled for surgery with Dr. Riley List on 02- at 2:00pm with an arrival of 12:30pm.  Preop instructions and antibacterial soap given. Surgery consent signed. Cardiac clearance per Dr. Konrad Lacy.

## 2022-01-28 ENCOUNTER — OFFICE VISIT (OUTPATIENT)
Dept: CARDIOLOGY CLINIC | Age: 57
End: 2022-01-28
Payer: COMMERCIAL

## 2022-01-28 VITALS
SYSTOLIC BLOOD PRESSURE: 135 MMHG | DIASTOLIC BLOOD PRESSURE: 89 MMHG | HEIGHT: 70 IN | BODY MASS INDEX: 28.66 KG/M2 | WEIGHT: 200.2 LBS | HEART RATE: 72 BPM

## 2022-01-28 DIAGNOSIS — I25.10 CORONARY ARTERY DISEASE INVOLVING NATIVE CORONARY ARTERY OF NATIVE HEART WITHOUT ANGINA PECTORIS: ICD-10-CM

## 2022-01-28 DIAGNOSIS — Z01.818 PRE-OP EVALUATION: Primary | ICD-10-CM

## 2022-01-28 DIAGNOSIS — Z82.49 FAMILY HISTORY OF EARLY CAD: Chronic | ICD-10-CM

## 2022-01-28 DIAGNOSIS — Z98.890 S/P CARDIAC CATH: ICD-10-CM

## 2022-01-28 DIAGNOSIS — E78.5 DYSLIPIDEMIA: Chronic | ICD-10-CM

## 2022-01-28 DIAGNOSIS — I10 HYPERTENSION, ESSENTIAL: Chronic | ICD-10-CM

## 2022-01-28 PROBLEM — R07.89 CHEST PAIN, ATYPICAL: Status: RESOLVED | Noted: 2021-12-07 | Resolved: 2022-01-28

## 2022-01-28 PROCEDURE — 99214 OFFICE O/P EST MOD 30 MIN: CPT | Performed by: INTERNAL MEDICINE

## 2022-01-28 NOTE — PROGRESS NOTES
Chief Complaint   Patient presents with    Follow Up After Procedure    Pre-op Exam   originally  patient moved from [facility]  ? ? ? Hx of Mitral valve repair/ clip for MR 5 yrs back- transcathater- per pat ( not confirmed)  ? ? ? Hx of coronary stent 2 5 yrs back per pat ( Not confirmed from record)  Record from outside  Reviewed and the above claim of the pat could be verified      3 month F/u and sent from ED for chest pain    Stress and echo follow up. Pre-op for hernia repair with Dr. Ricarda Dance on 2-4-22. No more chest pain    Denied  Chest pain , sob, dizziness or edema    EKG done 12-5-2021. Nonsmoker    FHX  Mother had valve procedure, had stent at 72    Patient Active Problem List   Diagnosis    Coronary artery disease involving native coronary artery of native heart without angina pectoris    Diastolic murmur    Family history of early CAD    LVH (left ventricular hypertrophy)    Dyslipidemia    Former smoker    Heart failure with preserved ejection fraction ([address]    Hypertension, essential    Mitral regurgitation    Nicotine dependence, chewing tobacco, uncomplicated    Obesity (BMI 30-39. 9)    Dysfunction of both eustachian tubes    Right lateral epicondylitis    Left anterior cruciate ligament tear    Tear of lateral meniscus of left knee, current    S/P cardiac cath 2015 mod LAD lesion 30 to 50%- med RX    BAILEY (dyspnea on exertion)    Coronary artery disease involving native coronary artery of native heart    Palpitations one episode    COVID    Pre-op evaluation- hernia repair -for RT sided inguinal hernia       Past Surgical History:   Procedure Laterality Date    APPENDECTOMY      CARDIAC CATHETERIZATION      COLONOSCOPY  2017    [place]-- medical records could not send anything over 6years old   Delmy Heredia Left 05/13/2016    [place], [place] KNEE SURGERY Left 02/2021    MITRAL VALVE REPAIR      ROTATOR CUFF REPAIR Right     right and left       No Known Allergies     Family History   Problem Relation Age of Onset    Diabetes Mother     Mitral Valve Prolapse Mother     Heart Attack Mother     Obesity Mother     No Known Problems Father     No Known Problems Sister     No Known Problems Sister     Thyroid Disease Sister     Thyroid Disease Sister     No Known Problems Brother     No Known Problems Maternal Grandmother     No Known Problems Maternal Grandfather     No Known Problems Paternal Grandmother     Alzheimer's Disease Paternal Grandfather     Colon Cancer Neg Hx     Prostate Cancer Neg Hx         Social History     Socioeconomic History    Marital status:      Spouse name: Not on file    Number of children: Not on file    Years of education: Not on file    Highest education level: Not on file   Occupational History    Not on file   Tobacco Use    Smoking status: Never Smoker    Smokeless tobacco: Current User     Types: Chew   Vaping Use    Vaping Use: Never used   Substance and Sexual Activity    Alcohol use: Yes     Comment: socially    Drug use: Never    Sexual activity: Not on file   Other Topics Concern    Not on file   Social History Narrative    Not on file     Social Determinants of Health     Financial Resource Strain:     Difficulty of Paying Living Expenses: Not on file   Food Insecurity:     Worried About Running Out of Food in the Last Year: Not on file    Sridevi of Food in the Last Year: Not on file   Transportation Needs:     Lack of Transportation (Medical): Not on file    Lack of Transportation (Non-Medical):  Not on file   Physical Activity:     Days of Exercise per Week: Not on file    Minutes of Exercise per Session: Not on file   Stress:     Feeling of Stress : Not on file   Social Connections:     Frequency of Communication with Friends and Family: Not on file    Frequency of Social Gatherings with Friends and Family: Not on file   Ender Rutledge Attends [employer] Services: Not on file    Active Member of Clubs or Organizations: Not on file    Attends Club or Organization Meetings: Not on file    Marital Status: Not on file   Intimate Partner Violence:     Fear of Current or Ex-Partner: Not on file    Emotionally Abused: Not on file    Physically Abused: Not on file    Sexually Abused: Not on file   Housing Stability:     Unable to Pay for Housing in the Last Year: Not on file    Number of Places Lived in the Last Year: Not on file    Unstable Housing in the Last Year: Not on file       Current Outpatient Medications   Medication Sig Dispense Refill    Multiple Vitamins-Minerals (THERAPEUTIC MULTIVITAMIN-MINERALS) tablet Take 1 tablet by mouth daily      Ascorbic Acid (VITAMIN C) 1000 MG tablet Take 1 tablet by mouth daily 30 tablet 0    vitamin D (CHOLECALCIFEROL) 50 MCG (2000 UT) TABS tablet Take 1 tablet by mouth daily 30 tablet 0    zinc 50 MG CAPS Take 50 mg by mouth daily 30 capsule 0    atorvastatin (LIPITOR) 20 MG tablet TAKE 1 TABLET DAILY 90 tablet 3    lisinopril (PRINIVIL;ZESTRIL) 20 MG tablet TAKE 1 TABLET DAILY 90 tablet 3    BYSTOLIC 2.5 MG tablet TAKE 1 TABLET DAILY 90 tablet 0    fluticasone (FLONASE) 50 MCG/ACT nasal spray USE 1 SPRAY IN EACH NOSTRIL DAILY 16 g 5    albuterol sulfate HFA (VENTOLIN HFA) 108 (90 Base) MCG/ACT inhaler Inhale 2 puffs into the lungs 4 times daily as needed for Wheezing 1 Inhaler 0    amLODIPine (NORVASC) 10 MG tablet Take 1 tablet by mouth daily      aspirin EC 81 MG EC tablet Take 1 tablet by mouth daily 90 tablet 1     No current facility-administered medications for this visit. Review of Systems -     General ROS: negative  Psychological ROS: negative  Hematological and Lymphatic ROS: No history of blood clots or bleeding disorder.    Respiratory ROS: no cough,  or wheezing, the rest see HPI  Cardiovascular ROS: See HPI  Gastrointestinal ROS: negative  Genito-Urinary ROS: no dysuria, 01/19/2022    BILITOT 0.7 01/19/2022    BILIDIR <0.2 02/09/2021    LABALBU 4.0 01/19/2022     Magnesium:    Lab Results   Component Value Date    MG 2.1 10/29/2020     Warfarin PT/INR:  No components found for: PTPATWAR, PTINRWAR  HgBA1c:  No results found for: LABA1C  FLP:    Lab Results   Component Value Date    TRIG 87 02/09/2021    HDL 55 02/09/2021    LDLCALC 76 02/09/2021     TSH:  No results found for: TSH      Reviewed the record from outside Hospital  Cardiac cath Dec 30, 2015 at 09851  28, [address] < [place]  ]EF 65%  LAD : 30-50%  LCx/RCA: Normal  RCA could not be accessed radially- had to be accessed through femoral access    Electronically signed by Eufemia Campbell MD at 12/30/2015 11:37 AM EST      Mid LAD lesion 50% stenosed.       Final Impression:        1.  Coronary artery disease as described above    2.  Mild to moderate stenosis of the mid left anterior descending artery.    3.  The left circumflex Artery and the right coronary artery appear     angiographically normal.  The right coronary artery has a posterior     takeoff.    4.  Left ventricular filling pressures are normal    5.  Left ventricular systolic function is normal           Plan:        1.  Adding calcium channel blockers for possible spasm.    2.  Aspirin and statin therapy is imperative.      Aspen Patel MD, [facility], [place]  Echo Dec 2015  CONCLUSIONS     Left ventricular wall thickness mildly increased. Left ventricular cavity size normal.  Mild global hypokinesis. LVEF   45-50%.     Normal right ventricular size.   Mildly reduced right ventricular global systolic function.     Mild-to-moderate mitral regurgitation    ekg  8/29/19  Sinus  Bradycardia   WITHIN NORMAL LIMITS    Conclusions      Summary   Normal left ventricle size and systolic function. Ejection fraction was   estimated at 60 %. There were no regional left ventricular wall motion   abnormalities and wall thickness was within normal limits.    The left atrium is Mildly dilated. Signature      ----------------------------------------------------------------   Electronically signed by Tova Rivas MD (Interpreting   physician) on 09/05/2019 at 05:39 PM    Conclusions      Summary   Lexiscan EKG stress test is not suggestive for ischemia. The nuclear images is not suggestive for myocardial ischemia. Signatures      ----------------------------------------------------------------   Electronically signed by oTva Rivas MD (Interpreting   Cardiologist) on 01/28/2021 at 19:12   ----------------------------------------------------------------    Conclusions      Summary   Normal left ventricle size and systolic function. Ejection fraction was   estimated at 60 %. There were no regional left ventricular wall motion   abnormalities and wall thickness was within normal limits. The left atrium is Mildly dilated. Signature      ----------------------------------------------------------------   Electronically signed by Tova Rivas MD (Interpreting   physician) on 12/16/2021 at 06:52 PM   ----------------------------------------------------------------       Conclusions      Summary   Lexiscan EKG stress test is not suggestive for ischemia. The nuclear images is not suggestive for myocardial ischemia. Signatures      ----------------------------------------------------------------   Electronically signed by Tova Rivas MD (Interpreting   Cardiologist) on 01/28/2021 at 19:12      ekg 1/22/21  Sinus  Bradycardia   WITHIN NORMAL LIMITS    ekg 12/7/21  Sinus bradycardia Otherwise normal ECG When compared with ECG of 29-OCT-2020 00:30, No significant change was found Confirmed by Marlane Opitz MD, [facility] (3    Troponinn neg x2    Assessment    ? ? ? Hx valve procedure mitral valve ( could not be verified from the record)     Diagnosis Orders   1. Pre-op evaluation- hernia repair -for RT sided inguinal hernia     2.  Coronary artery disease involving native coronary artery of native heart without angina pectoris     3. Hypertension, essential     4. Dyslipidemia     5. S/P cardiac cath 2015 mod LAD lesion 30 to 50%- med RX     6. Family history of early CAD           Plan   The   Most current meds and labs reviewed    Continue the current treatment and with constant vigilance to changes in symptoms and also any potential side effects. Return for care or seek medical attention immediately if symptoms got worse and/or develop new symptoms. Hypertension, on medical treatment. Seems to be under good control. Patient is compliant with medical treatment. Hyperlipidemia: on statins, followed periodically. Patient need periodic lipid and liver profile. Coronary artery disease, seems to be stable. Denies angina or change in breathing pattern  Coronary artery disease, mod non-obst  Never had any intervention from the record from cardiac stand point  But moderate nonobstructive CAD noted  Cont asa and statins  Asa 81 mg po qd  Metoprolol stopped In [place] due to ED per pat  Now on Bystolic    Echo- WNL   report from  [facility] and [place] reviewed   NO stent noted   No Mitral valve surgery Noted from records  No Hx of cardiac or coronary intervention  Echo - WNL  Greg nuc neg    Stable and doing well    D/w the pat the plan of care details    patient is advised to exercise 30 min s a day three times a week and about weight loss ,balance diet and     More fruits and vegetables . Discussed use, benefit, and side effects of prescribed medications. All patient questions answered. Pt voiced understanding. Instructed to continue current medications, diet and exercise. Continue risk factor modification and medical management. Patient agreed with treatment plan. Follow up as directed.       RTC in 6 months    Dinesh Gallegos, [facility]

## 2022-01-31 ENCOUNTER — PREP FOR PROCEDURE (OUTPATIENT)
Dept: SURGERY | Age: 57
End: 2022-01-31

## 2022-01-31 RX ORDER — SODIUM CHLORIDE 9 MG/ML
INJECTION, SOLUTION INTRAVENOUS CONTINUOUS
Status: CANCELLED | OUTPATIENT
Start: 2022-01-31

## 2022-02-03 NOTE — H&P
[address] [facility] [address]  Dept: [phone]  Dept Fax: [phone]  Loc: [phone]    Visit Date: 1/20/2022    Lety Real is a 55 y.o. male who presentstoday for:  Chief Complaint   Patient presents with    Surgical Consult     New patient-- [facility] emergency room follow up 1/19-- right inguinal hernia       HPI:     HPI 81-year-old white male who works for [employer] here in [place] present here for 3 years he has a history of having had a left inguinal hernia repaired with a ventral hernia repaired primarily at the same time in May 2016 in [place]. [facility] and had onset of right groin pain the patient went to the emergency room yesterday because the pain was increased in severity and apparently had a right inguinal hernia reduced per ER physician and then had a CT scan confirming a right inguinal hernia he has been referred for possible repair of same patient believes that hernia is relatively recent onset    Past Medical History:   Diagnosis Date    ASHD (arteriosclerotic heart disease)    [facility] Dyslipidemia     Former smoker     GERD (gastroesophageal reflux disease)     Heart attack ([address]     Heart failure with preserved ejection fraction ([address]     Hx of prior LV dysfunction    Hypertension, essential     Mitral regurgitation     Nicotine dependence, chewing tobacco, uncomplicated     Obesity (BMI 30-39. 9)     S/P coronary artery stent placement     S/P mitral valve repair     Torn meniscus       Past Surgical History:   Procedure Laterality Date    APPENDECTOMY      CARDIAC CATHETERIZATION      COLONOSCOPY      [place]-- medical records could not send anything over 6years old   [address] Amos Apley Right 05/13/2016    [address], [address] Left 05/13/2016 [place], [address] [place] [address] KNEE SURGERY Left 02/2021    MITRAL VALVE REPAIR      ROTATOR CUFF REPAIR Right     right and left    VENTRAL HERNIA REPAIR  05/13/2016    [facility] [place], [place]        Family History   Problem Relation Age of Onset    No Known Problems Mother     No Known Problems Father     Colon Cancer Neg Hx     Prostate Cancer Neg Hx         Social History     Tobacco Use    Smoking status: Never Smoker    Smokeless tobacco: Former User     Types: Chew   Substance Use Topics    Alcohol use: Not Currently          Current Outpatient Medications   Medication Sig Dispense Refill    ketorolac (TORADOL) 10 MG tablet Take 1 tablet by mouth every 6 hours as needed for Pain 20 tablet 0    ondansetron (ZOFRAN ODT) 4 MG disintegrating tablet Take 1 tablet by mouth every 8 hours as needed for Nausea or Vomiting 15 tablet 0    Ascorbic Acid (VITAMIN C) 1000 MG tablet Take 1 tablet by mouth daily 30 tablet 0    vitamin D (CHOLECALCIFEROL) 50 MCG (2000 UT) TABS tablet Take 1 tablet by mouth daily 30 tablet 0    zinc 50 MG CAPS Take 50 mg by mouth daily 30 capsule 0    aspirin 325 MG EC tablet Take 1 tablet by mouth daily for 7 days 30 tablet 0    atorvastatin (LIPITOR) 20 MG tablet TAKE 1 TABLET DAILY 90 tablet 3    lisinopril (PRINIVIL;ZESTRIL) 20 MG tablet TAKE 1 TABLET DAILY 90 tablet 3    BYSTOLIC 2.5 MG tablet TAKE 1 TABLET DAILY 90 tablet 0    fluticasone (FLONASE) 50 MCG/ACT nasal spray USE 1 SPRAY IN EACH NOSTRIL DAILY 16 g 5    albuterol sulfate HFA (VENTOLIN HFA) 108 (90 Base) MCG/ACT inhaler Inhale 2 puffs into the lungs 4 times daily as needed for Wheezing 1 Inhaler 0    amLODIPine (NORVASC) 10 MG tablet Take 1 tablet by mouth daily      aspirin EC 81 MG EC tablet Take 1 tablet by mouth daily 90 tablet 1     No current facility-administered medications for this visit. No Known Allergies    Subjective:      Review of Systems   Constitutional: Positive for appetite change.  Negative for activity change, chills, diaphoresis, fatigue, fever and unexpected weight change. HENT: Negative. Negative for congestion, dental problem, drooling, ear discharge, ear pain, facial swelling, hearing loss, mouth sores, nosebleeds, postnasal drip, rhinorrhea, sinus pressure, sinus pain, sneezing, sore throat, tinnitus, trouble swallowing and voice change. Eyes: Negative. Negative for photophobia, pain, discharge, redness, itching and visual disturbance. Respiratory: Negative. Negative for apnea, cough, choking, chest tightness, shortness of breath, wheezing and stridor. Cardiovascular: Negative. Negative for chest pain, palpitations and leg swelling. Gastrointestinal: Positive for abdominal pain. Endocrine: Negative. Negative for cold intolerance, heat intolerance, polydipsia, polyphagia and polyuria. Genitourinary: Negative. Negative for decreased urine volume, difficulty urinating, dysuria, enuresis, flank pain, frequency, genital sores, hematuria, penile discharge, penile pain, penile swelling, scrotal swelling, testicular pain and urgency. Musculoskeletal: Negative. Negative for arthralgias, back pain, gait problem, joint swelling, myalgias, neck pain and neck stiffness. Skin: Negative. Negative for color change, pallor, rash and wound. Allergic/Immunologic: Negative. Negative for environmental allergies, food allergies and immunocompromised state. Neurological: Negative. Negative for dizziness, tremors, seizures, syncope, facial asymmetry, speech difficulty, weakness, light-headedness, numbness and headaches. Hematological: Negative. Negative for adenopathy. Does not bruise/bleed easily. Psychiatric/Behavioral: Negative for agitation, behavioral problems, confusion, decreased concentration, dysphoric mood, hallucinations, self-injury, sleep disturbance and suicidal ideas. The patient is not nervous/anxious and is not hyperactive. Objective:    There were no vitals taken for this visit. Physical Exam  Constitutional:       Appearance: He is well-developed. HENT:      Head: Normocephalic and atraumatic. Eyes:      General: No scleral icterus. Left eye: No discharge. Conjunctiva/sclera: Conjunctivae normal.      Pupils: Pupils are equal, round, and reactive to light. Neck:      Thyroid: No thyromegaly. Trachea: No tracheal deviation. Cardiovascular:      Rate and Rhythm: Normal rate and regular rhythm. Heart sounds: Normal heart sounds. No murmur heard. No friction rub. No gallop. Pulmonary:      Effort: Pulmonary effort is normal. No respiratory distress. Breath sounds: Normal breath sounds. No wheezing or rales. Chest:      Chest wall: No tenderness. Abdominal:       Musculoskeletal:         General: No tenderness. Normal range of motion. Lymphadenopathy:      Cervical: No cervical adenopathy. Skin:     General: Skin is warm and dry. Coloration: Skin is not pale. Findings: No erythema or rash. Neurological:      Mental Status: He is alert and oriented to person, place, and time. Psychiatric:         Behavior: Behavior normal.         Thought Content:  Thought content normal.         Judgment: Judgment normal.            Results for orders placed or performed during the hospital encounter of 01/19/22   CBC Auto Differential   Result Value Ref Range    WBC 6.6 4.8 - 10.8 thou/mm3    RBC 4.82 4.70 - 6.10 mill/mm3    Hemoglobin 14.9 14.0 - 18.0 gm/dl    Hematocrit 45.4 42.0 - 52.0 %    MCV 94.2 (H) 80.0 - 94.0 fL    MCH 30.9 26.0 - 33.0 pg    MCHC 32.8 32.2 - 35.5 gm/dl    RDW-CV 12.0 11.5 - 14.5 %    RDW-SD 42.2 35.0 - 45.0 fL    Platelets 222 600 - 185 thou/mm3    MPV 8.3 (L) 9.4 - 12.4 fL    Seg Neutrophils 72.5 %    Lymphocytes 19.6 %    Monocytes 5.9 %    Eosinophils 0.6 %    Basophils 0.6 %    Immature Granulocytes 0.8 %    Segs Absolute 4.8 1.8 - 7.7 thou/mm3    Lymphocytes Absolute 1.3 1.0 - 4.8 thou/mm3 Monocytes Absolute 0.4 0.4 - 1.3 thou/mm3    Eosinophils Absolute 0.0 0.0 - 0.4 thou/mm3    Basophils Absolute 0.0 0.0 - 0.1 thou/mm3    Immature Grans (Abs) 0.05 0.00 - 0.07 thou/mm3    nRBC 0 /100 wbc   Comprehensive Metabolic Panel   Result Value Ref Range    Glucose 108 70 - 108 mg/dL    CREATININE 0.8 0.4 - 1.2 mg/dL    BUN 16 7 - 22 mg/dL    Sodium 136 135 - 145 meq/L    Potassium 5.3 (H) 3.5 - 5.2 meq/L    Chloride 101 98 - 111 meq/L    CO2 25 23 - 33 meq/L    Calcium 9.7 8.5 - 10.5 mg/dL    AST 22 5 - 40 U/L    Alkaline Phosphatase 74 38 - 126 U/L    Total Protein 7.1 6.1 - 8.0 g/dL    Albumin 4.0 3.5 - 5.1 g/dL    Total Bilirubin 0.7 0.3 - 1.2 mg/dL    ALT 22 11 - 66 U/L   Lipase   Result Value Ref Range    Lipase 40.5 5.6 - 51.3 U/L   Urine reflex to culture    Specimen: Urine, clean catch   Result Value Ref Range    Glucose, Ur NEGATIVE NEGATIVE mg/dl    Bilirubin Urine NEGATIVE NEGATIVE    Ketones, Urine NEGATIVE NEGATIVE    Specific Gravity, Urine 1.011 1.002 - 1.030    Blood, Urine NEGATIVE NEGATIVE    pH, UA 7.0 5.0 - 9.0    Protein, UA NEGATIVE NEGATIVE    Urobilinogen, Urine 1.0 0.0 - 1.0 eu/dl    Nitrite, Urine NEGATIVE NEGATIVE    Leukocyte Esterase, Urine NEGATIVE NEGATIVE    Color, UA YELLOW STRAW-YELLOW    Character, Urine CLEAR CLEAR-SL CLOUD   Troponin   Result Value Ref Range    Troponin T < 0.010 ng/ml   Anion Gap   Result Value Ref Range    Anion Gap 10.0 8.0 - 16.0 meq/L   Osmolality   Result Value Ref Range    Osmolality Calc 273.7 (L) 275.0 - 300.0 mOsmol/kg   Glomerular Filtration Rate, Estimated   Result Value Ref Range    Est, Glom Filt Rate >90 ml/min/1.73m2   EKG Emergency   Result Value Ref Range    Ventricular Rate 58 BPM    Atrial Rate 58 BPM    P-R Interval 152 ms    QRS Duration 78 ms    Q-T Interval 456 ms    QTc Calculation (Bazett) 447 ms    P Axis 28 degrees    R Axis 5 degrees    T Axis 22 degrees       Assessment:     It inguinal hernia with a history of having had a left inguinal hernia and umbilical hernia repaired at that same time that was done via a laparoscopic Tepp procedure the umbilical hernia was repaired primarily with mesh only used with the left inguinal hernia repair patient actually thought he had had a right inguinal hernia repaired at that time but medical record operative note insinuates otherwise we discussed the robotic repair of hernias and the use of mesh he voices understanding and would like to proceed    Plan:     1. Schedule Minajarrell Knappa for biotic assisted repair of right inguinal hernia  2. The risks, benefits and alternatives were discussed with Gabino Blount. He understands and wishes to proceed with surgical intervention. 3. Restrictions discussed with Gabino Blount and he expresses understanding. 4. He is advised to call back directly if there are further questions/concerns, or if his symptoms worsen prior to surgery.             Electronicallysigned by Claudell Boor, MD on 1/19/2022 at 7:47 PM

## 2022-02-03 NOTE — H&P
[address] [address]  History and Physical Update      Pt Name: Lemuel Mcburney  MRN: [id number]  YOB: 1965  Date of evaluation: 2/3/2022    [x] I have examined the patient and reviewed the H&P/Consult and there are no changes to the patient or plans. [] I have examined the patient and reviewed the H&P/Consult and have noted the following changes:         Sukhjinder Vargas MD  Electronically signed 2/3/2022 at 1:03 PM

## 2022-02-04 ENCOUNTER — HOSPITAL ENCOUNTER (OUTPATIENT)
Age: 57
Setting detail: OUTPATIENT SURGERY
Discharge: HOME | End: 2022-02-04
Attending: SURGERY | Admitting: SURGERY
Payer: COMMERCIAL

## 2022-02-04 ENCOUNTER — ANESTHESIA (OUTPATIENT)
Dept: OPERATING ROOM | Age: 57
End: 2022-02-04
Payer: COMMERCIAL

## 2022-02-04 ENCOUNTER — ANESTHESIA EVENT (OUTPATIENT)
Dept: OPERATING ROOM | Age: 57
End: 2022-02-04
Payer: COMMERCIAL

## 2022-02-04 VITALS
HEIGHT: 70 IN | TEMPERATURE: 97.4 F | BODY MASS INDEX: 27.52 KG/M2 | HEART RATE: 68 BPM | SYSTOLIC BLOOD PRESSURE: 154 MMHG | OXYGEN SATURATION: 95 % | RESPIRATION RATE: 16 BRPM | WEIGHT: 192.25 LBS | DIASTOLIC BLOOD PRESSURE: 79 MMHG

## 2022-02-04 VITALS — OXYGEN SATURATION: 97 % | SYSTOLIC BLOOD PRESSURE: 116 MMHG | DIASTOLIC BLOOD PRESSURE: 63 MMHG | TEMPERATURE: 97 F

## 2022-02-04 DIAGNOSIS — Z01.818 PRE-OP EVALUATION: Primary | ICD-10-CM

## 2022-02-04 LAB — POTASSIUM SERPL-SCNC: 4.2 MEQ/L (ref 3.5–5.2)

## 2022-02-04 PROCEDURE — 7100000001 HC PACU RECOVERY - ADDTL 15 MIN: Performed by: SURGERY

## 2022-02-04 PROCEDURE — 3600000009 HC SURGERY ROBOT BASE: Performed by: SURGERY

## 2022-02-04 PROCEDURE — 7100000010 HC PHASE II RECOVERY - FIRST 15 MIN: Performed by: SURGERY

## 2022-02-04 PROCEDURE — 3700000000 HC ANESTHESIA ATTENDED CARE: Performed by: SURGERY

## 2022-02-04 PROCEDURE — 7100000011 HC PHASE II RECOVERY - ADDTL 15 MIN: Performed by: SURGERY

## 2022-02-04 PROCEDURE — S2900 ROBOTIC SURGICAL SYSTEM: HCPCS | Performed by: SURGERY

## 2022-02-04 PROCEDURE — 84132 ASSAY OF SERUM POTASSIUM: CPT

## 2022-02-04 PROCEDURE — 49651 LAP ING HERNIA REPAIR RECUR: CPT | Performed by: SURGERY

## 2022-02-04 PROCEDURE — 2580000003 HC RX 258

## 2022-02-04 PROCEDURE — 36415 COLL VENOUS BLD VENIPUNCTURE: CPT

## 2022-02-04 PROCEDURE — 2500000003 HC RX 250 WO HCPCS: Performed by: NURSE ANESTHETIST, CERTIFIED REGISTERED

## 2022-02-04 PROCEDURE — 2709999900 HC NON-CHARGEABLE SUPPLY: Performed by: SURGERY

## 2022-02-04 PROCEDURE — 7100000000 HC PACU RECOVERY - FIRST 15 MIN: Performed by: SURGERY

## 2022-02-04 PROCEDURE — 2500000003 HC RX 250 WO HCPCS: Performed by: SURGERY

## 2022-02-04 PROCEDURE — 6370000000 HC RX 637 (ALT 250 FOR IP)

## 2022-02-04 PROCEDURE — 6360000002 HC RX W HCPCS: Performed by: NURSE ANESTHETIST, CERTIFIED REGISTERED

## 2022-02-04 PROCEDURE — C1781 MESH (IMPLANTABLE): HCPCS | Performed by: SURGERY

## 2022-02-04 PROCEDURE — 3600000019 HC SURGERY ROBOT ADDTL 15MIN: Performed by: SURGERY

## 2022-02-04 PROCEDURE — 6360000002 HC RX W HCPCS: Performed by: PHARMACIST

## 2022-02-04 PROCEDURE — 3700000001 HC ADD 15 MINUTES (ANESTHESIA): Performed by: SURGERY

## 2022-02-04 DEVICE — MESH HERN W10XL15CM POLY POLYLACTIC ACID 70% CLLGN 30% GLYC: Type: IMPLANTABLE DEVICE | Site: INGUINAL | Status: FUNCTIONAL

## 2022-02-04 RX ORDER — FENTANYL CITRATE 50 UG/ML
INJECTION, SOLUTION INTRAMUSCULAR; INTRAVENOUS PRN
Status: DISCONTINUED | OUTPATIENT
Start: 2022-02-04 | End: 2022-02-04 | Stop reason: SDUPTHER

## 2022-02-04 RX ORDER — MEPERIDINE HYDROCHLORIDE 25 MG/ML
12.5 INJECTION INTRAMUSCULAR; INTRAVENOUS; SUBCUTANEOUS EVERY 5 MIN PRN
Status: DISCONTINUED | OUTPATIENT
Start: 2022-02-04 | End: 2022-02-04 | Stop reason: HOSPADM

## 2022-02-04 RX ORDER — LIDOCAINE HCL/PF 100 MG/5ML
SYRINGE (ML) INJECTION PRN
Status: DISCONTINUED | OUTPATIENT
Start: 2022-02-04 | End: 2022-02-04 | Stop reason: SDUPTHER

## 2022-02-04 RX ORDER — PROPOFOL 10 MG/ML
INJECTION, EMULSION INTRAVENOUS PRN
Status: DISCONTINUED | OUTPATIENT
Start: 2022-02-04 | End: 2022-02-04 | Stop reason: SDUPTHER

## 2022-02-04 RX ORDER — OXYCODONE HYDROCHLORIDE AND ACETAMINOPHEN 5; 325 MG/1; MG/1
1 TABLET ORAL ONCE
Status: CANCELLED | OUTPATIENT
Start: 2022-02-04

## 2022-02-04 RX ORDER — SODIUM CHLORIDE 9 MG/ML
INJECTION, SOLUTION INTRAVENOUS CONTINUOUS
Status: DISCONTINUED | OUTPATIENT
Start: 2022-02-04 | End: 2022-02-04 | Stop reason: HOSPADM

## 2022-02-04 RX ORDER — OXYCODONE HYDROCHLORIDE AND ACETAMINOPHEN 5; 325 MG/1; MG/1
TABLET ORAL
Status: COMPLETED
Start: 2022-02-04 | End: 2022-02-04

## 2022-02-04 RX ORDER — FENTANYL CITRATE 50 UG/ML
50 INJECTION, SOLUTION INTRAMUSCULAR; INTRAVENOUS EVERY 5 MIN PRN
Status: DISCONTINUED | OUTPATIENT
Start: 2022-02-04 | End: 2022-02-04 | Stop reason: HOSPADM

## 2022-02-04 RX ORDER — OXYCODONE HYDROCHLORIDE AND ACETAMINOPHEN 5; 325 MG/1; MG/1
1 TABLET ORAL EVERY 6 HOURS PRN
Qty: 16 TABLET | Refills: 0 | Status: SHIPPED | OUTPATIENT
Start: 2022-02-04 | End: 2022-02-15 | Stop reason: ALTCHOICE

## 2022-02-04 RX ORDER — MIDAZOLAM HYDROCHLORIDE 1 MG/ML
INJECTION INTRAMUSCULAR; INTRAVENOUS PRN
Status: DISCONTINUED | OUTPATIENT
Start: 2022-02-04 | End: 2022-02-04 | Stop reason: SDUPTHER

## 2022-02-04 RX ORDER — FENTANYL CITRATE 50 UG/ML
25 INJECTION, SOLUTION INTRAMUSCULAR; INTRAVENOUS EVERY 5 MIN PRN
Status: DISCONTINUED | OUTPATIENT
Start: 2022-02-04 | End: 2022-02-04 | Stop reason: HOSPADM

## 2022-02-04 RX ORDER — ONDANSETRON 2 MG/ML
INJECTION INTRAMUSCULAR; INTRAVENOUS PRN
Status: DISCONTINUED | OUTPATIENT
Start: 2022-02-04 | End: 2022-02-04 | Stop reason: SDUPTHER

## 2022-02-04 RX ORDER — LABETALOL 20 MG/4 ML (5 MG/ML) INTRAVENOUS SYRINGE
5 EVERY 10 MIN PRN
Status: DISCONTINUED | OUTPATIENT
Start: 2022-02-04 | End: 2022-02-04 | Stop reason: HOSPADM

## 2022-02-04 RX ORDER — BUPIVACAINE HYDROCHLORIDE 5 MG/ML
INJECTION, SOLUTION PERINEURAL PRN
Status: DISCONTINUED | OUTPATIENT
Start: 2022-02-04 | End: 2022-02-04 | Stop reason: ALTCHOICE

## 2022-02-04 RX ORDER — DIPHENHYDRAMINE HYDROCHLORIDE 50 MG/ML
12.5 INJECTION INTRAMUSCULAR; INTRAVENOUS
Status: DISCONTINUED | OUTPATIENT
Start: 2022-02-04 | End: 2022-02-04 | Stop reason: HOSPADM

## 2022-02-04 RX ORDER — PROMETHAZINE HYDROCHLORIDE 25 MG/ML
12.5 INJECTION, SOLUTION INTRAMUSCULAR; INTRAVENOUS
Status: DISCONTINUED | OUTPATIENT
Start: 2022-02-04 | End: 2022-02-04 | Stop reason: HOSPADM

## 2022-02-04 RX ORDER — DEXAMETHASONE SODIUM PHOSPHATE 10 MG/ML
INJECTION, EMULSION INTRAMUSCULAR; INTRAVENOUS PRN
Status: DISCONTINUED | OUTPATIENT
Start: 2022-02-04 | End: 2022-02-04 | Stop reason: SDUPTHER

## 2022-02-04 RX ORDER — ROCURONIUM BROMIDE 10 MG/ML
INJECTION, SOLUTION INTRAVENOUS PRN
Status: DISCONTINUED | OUTPATIENT
Start: 2022-02-04 | End: 2022-02-04 | Stop reason: SDUPTHER

## 2022-02-04 RX ORDER — METOCLOPRAMIDE HYDROCHLORIDE 5 MG/ML
10 INJECTION INTRAMUSCULAR; INTRAVENOUS
Status: DISCONTINUED | OUTPATIENT
Start: 2022-02-04 | End: 2022-02-04 | Stop reason: HOSPADM

## 2022-02-04 RX ORDER — PROPOFOL 10 MG/ML
INJECTION, EMULSION INTRAVENOUS PRN
Status: DISCONTINUED | OUTPATIENT
Start: 2022-02-04 | End: 2022-02-04

## 2022-02-04 RX ADMIN — MIDAZOLAM 2 MG: 1 INJECTION INTRAMUSCULAR; INTRAVENOUS at 12:33

## 2022-02-04 RX ADMIN — FENTANYL CITRATE 50 MCG: 50 INJECTION, SOLUTION INTRAMUSCULAR; INTRAVENOUS at 13:49

## 2022-02-04 RX ADMIN — FENTANYL CITRATE 100 MCG: 50 INJECTION, SOLUTION INTRAMUSCULAR; INTRAVENOUS at 12:33

## 2022-02-04 RX ADMIN — SUGAMMADEX 200 MG: 100 INJECTION, SOLUTION INTRAVENOUS at 13:39

## 2022-02-04 RX ADMIN — CEFAZOLIN SODIUM 2000 MG: 10 INJECTION, POWDER, FOR SOLUTION INTRAVENOUS at 12:45

## 2022-02-04 RX ADMIN — DEXAMETHASONE SODIUM PHOSPHATE 10 MG: 10 INJECTION, EMULSION INTRAMUSCULAR; INTRAVENOUS at 12:46

## 2022-02-04 RX ADMIN — Medication 80 MG: at 12:36

## 2022-02-04 RX ADMIN — ROCURONIUM BROMIDE 10 MG: 50 INJECTION, SOLUTION INTRAVENOUS at 13:23

## 2022-02-04 RX ADMIN — ROCURONIUM BROMIDE 10 MG: 50 INJECTION, SOLUTION INTRAVENOUS at 13:06

## 2022-02-04 RX ADMIN — PROPOFOL 200 MG: 10 INJECTION, EMULSION INTRAVENOUS at 12:37

## 2022-02-04 RX ADMIN — OXYCODONE AND ACETAMINOPHEN 1 TABLET: 5; 325 TABLET ORAL at 15:14

## 2022-02-04 RX ADMIN — FENTANYL CITRATE 50 MCG: 50 INJECTION, SOLUTION INTRAMUSCULAR; INTRAVENOUS at 13:55

## 2022-02-04 RX ADMIN — SODIUM CHLORIDE: 9 INJECTION, SOLUTION INTRAVENOUS at 13:47

## 2022-02-04 RX ADMIN — ROCURONIUM BROMIDE 40 MG: 50 INJECTION, SOLUTION INTRAVENOUS at 12:37

## 2022-02-04 RX ADMIN — SODIUM CHLORIDE: 9 INJECTION, SOLUTION INTRAVENOUS at 11:45

## 2022-02-04 RX ADMIN — ONDANSETRON 4 MG: 2 INJECTION INTRAMUSCULAR; INTRAVENOUS at 13:37

## 2022-02-04 ASSESSMENT — PULMONARY FUNCTION TESTS
PIF_VALUE: 1
PIF_VALUE: 11
PIF_VALUE: 22
PIF_VALUE: 22
PIF_VALUE: 21
PIF_VALUE: 22
PIF_VALUE: 22
PIF_VALUE: 15
PIF_VALUE: 11
PIF_VALUE: 11
PIF_VALUE: 1
PIF_VALUE: 22
PIF_VALUE: 16
PIF_VALUE: 23
PIF_VALUE: 15
PIF_VALUE: 21
PIF_VALUE: 20
PIF_VALUE: 20
PIF_VALUE: 22
PIF_VALUE: 16
PIF_VALUE: 20
PIF_VALUE: 17
PIF_VALUE: 19
PIF_VALUE: 21
PIF_VALUE: 20
PIF_VALUE: 21
PIF_VALUE: 17
PIF_VALUE: 13
PIF_VALUE: 11
PIF_VALUE: 17
PIF_VALUE: 21
PIF_VALUE: 1
PIF_VALUE: 21
PIF_VALUE: 23
PIF_VALUE: 27
PIF_VALUE: 22
PIF_VALUE: 17
PIF_VALUE: 20
PIF_VALUE: 11
PIF_VALUE: 18
PIF_VALUE: 20
PIF_VALUE: 17
PIF_VALUE: 1
PIF_VALUE: 20
PIF_VALUE: 11
PIF_VALUE: 8
PIF_VALUE: 11
PIF_VALUE: 6
PIF_VALUE: 3
PIF_VALUE: 22
PIF_VALUE: 1
PIF_VALUE: 20
PIF_VALUE: 23
PIF_VALUE: 22
PIF_VALUE: 1
PIF_VALUE: 17
PIF_VALUE: 11
PIF_VALUE: 21
PIF_VALUE: 15
PIF_VALUE: 23
PIF_VALUE: 16
PIF_VALUE: 21
PIF_VALUE: 22
PIF_VALUE: 20
PIF_VALUE: 16
PIF_VALUE: 22
PIF_VALUE: 22
PIF_VALUE: 16
PIF_VALUE: 21
PIF_VALUE: 22
PIF_VALUE: 21
PIF_VALUE: 20
PIF_VALUE: 12
PIF_VALUE: 21
PIF_VALUE: 11
PIF_VALUE: 21
PIF_VALUE: 15
PIF_VALUE: 11
PIF_VALUE: 11

## 2022-02-04 ASSESSMENT — PAIN SCALES - GENERAL
PAINLEVEL_OUTOF10: 4
PAINLEVEL_OUTOF10: 1
PAINLEVEL_OUTOF10: 0
PAINLEVEL_OUTOF10: 9
PAINLEVEL_OUTOF10: 8
PAINLEVEL_OUTOF10: 8

## 2022-02-04 ASSESSMENT — PAIN DESCRIPTION - DESCRIPTORS: DESCRIPTORS: ACHING

## 2022-02-04 ASSESSMENT — PAIN - FUNCTIONAL ASSESSMENT: PAIN_FUNCTIONAL_ASSESSMENT: 0-10

## 2022-02-04 NOTE — PROGRESS NOTES
Pt has met discharge criteria and states he is ready for discharge to home. IV removed, gauze and tape applied. Dressed in own clothes and personal belongings gathered. Discharge instructions (with opioid medication education information) given to pt and family; pt and family verbalized understanding of discharge instructions, prescriptions and follow up appointments. Pt transported to discharge lobby by [facility] staff.

## 2022-02-04 NOTE — PROGRESS NOTES
Patient admitted to [facility] room 8. IV started. Abdomen shaved. Consents in room. Call light in reach.

## 2022-02-04 NOTE — ANESTHESIA POSTPROCEDURE EVALUATION
Department of Anesthesiology  Postprocedure Note    Patient: Queta Aleman  MRN: [id number]  YOB: 1965  Date of evaluation: 2/4/2022  Time:  2:41 PM     Procedure Summary     Date: 02/04/22 Room / Location: [address] [place] [address] VIRI Saini    Anesthesia Start: 1229 Anesthesia Stop: 6234    Procedure: ROBOTIC RIGHT INGUINAL HERNIA REPAIR WITH MESH (Right Abdomen) Diagnosis: (RIGHT INGUINAL HERNIA)    Surgeons: Spenser To MD Responsible Provider: Genesis Ang MD    Anesthesia Type: general ASA Status: 3          Anesthesia Type: general    Hilda Phase I: Hilda Score: 9    Hilda Phase II: Hilda Score: 10    Last vitals: Reviewed and per EMR flowsheets. Anesthesia Post Evaluation    Patient location during evaluation: PACU  Patient participation: complete - patient participated  Level of consciousness: awake and alert  Airway patency: patent  Nausea & Vomiting: no nausea and no vomiting  Complications: no  Cardiovascular status: hemodynamically stable  Respiratory status: acceptable  Hydration status: euvolemic      [address]  POST-ANESTHESIA NOTE       Name:  Queta Aleman                                         Age:  55 y.o.   MRN:  [id number]      Last Vitals:  BP (!) 157/83   Pulse 65   Temp 96.2 °F (35.7 °C) (Temporal)   Resp 20   Ht 5' 10\" (1.778 m)   Wt 192 lb 4 oz (87.2 kg)   SpO2 96%   BMI 27.59 kg/m²   Patient Vitals for the past 4 hrs:   BP Temp Temp src Pulse Resp SpO2 Height Weight   02/04/22 1432 (!) 157/83 96.2 °F (35.7 °C) Temporal 65 20 96 %     02/04/22 1420 (!) 149/94   64 12 95 %     02/04/22 1415 (!) 147/90   66 16 97 %     02/04/22 1410 (!) 153/89   67 12 95 %     02/04/22 1405 (!) 154/87   70 11 93 %     02/04/22 1400 (!) 167/94   63 13 100 %     02/04/22 1355 (!) 173/95   63 24 100 %     02/04/22 1354 (!) 174/96 98.6 °F (37 °C) Temporal 68 11 95 %     02/04/22 1128       5' 10\" (1.778 m) 192 lb 4 oz (87.2 kg)   02/04/22 1122 (!) 152/90 97.5 °F (36.4 °C) Temporal 79 16 97 %         Level of Consciousness:  Awake    Respiratory:  Stable    Oxygen Saturation:  Stable    Cardiovascular:  Stable    Hydration:  Adequate    PONV:  Stable    Post-op Pain:  Adequate analgesia    Post-op Assessment:  No apparent anesthetic complications    Additional Follow-Up / Treatment / Comment:  None    Jorge Archer MD  February 4, 2022   2:41 PM

## 2022-02-04 NOTE — ANESTHESIA PRE PROCEDURE
Department of Anesthesiology  Preprocedure Note       Name:  Jenifer Montano   Age:  55 y.o.  :  1965                                          MRN:  [id number]         Date:  2022      Surgeon: Angus Lopez):  Maria De Jesus Day MD    Procedure: Procedure(s):  ROBOTIC RIGHT INGUINAL HERNIA REPAIR WITH MESH, POSS OPEN, POSS LEFT    Medications prior to admission:   Prior to Admission medications    Medication Sig Start Date End Date Taking?  Authorizing Provider   Multiple Vitamins-Minerals (THERAPEUTIC MULTIVITAMIN-MINERALS) tablet Take 1 tablet by mouth daily   Yes Historical Provider, MD   atorvastatin (LIPITOR) 20 MG tablet TAKE 1 TABLET DAILY 21  Yes Helen Kern MD   lisinopril (PRINIVIL;ZESTRIL) 20 MG tablet TAKE 1 TABLET DAILY 21  Yes Helen Kern MD   BYSTOLIC 2.5 MG tablet TAKE 1 TABLET DAILY 21  Yes Shawnie Gaucher, APRN - CNP   fluticasone (FLONASE) 50 MCG/ACT nasal spray USE 1 SPRAY IN EACH NOSTRIL DAILY 21  Yes Mary Thibodeaux DO   albuterol sulfate HFA (VENTOLIN HFA) 108 (90 Base) MCG/ACT inhaler Inhale 2 puffs into the lungs 4 times daily as needed for Wheezing 10/29/20  Yes Autumn Yu DO   amLODIPine (NORVASC) 10 MG tablet Take 1 tablet by mouth daily 3/31/20  Yes Historical Provider, MD   aspirin EC 81 MG EC tablet Take 1 tablet by mouth daily 20  Yes Helen Kern MD   Ascorbic Acid (VITAMIN C) 1000 MG tablet Take 1 tablet by mouth daily 1/3/22 2/2/22  Mary Thibodeaux DO   vitamin D (CHOLECALCIFEROL) 50 MCG (2000 UT) TABS tablet Take 1 tablet by mouth daily 1/3/22 2/2/22  Mary Thibodeaux DO   zinc 50 MG CAPS Take 50 mg by mouth daily 1/3/22 2/2/22  Mary Thibodeaux DO       Current medications:    Current Facility-Administered Medications   Medication Dose Route Frequency Provider Last Rate Last Admin    0.9 % sodium chloride infusion   IntraVENous Continuous Julia Medrano LPN 10 mL/hr at 01 1208 Rogerio at 22 1208  ceFAZolin (ANCEF) 2000 mg in dextrose 5 % 50 mL IVPB  2,000 mg IntraVENous 30 Min Pre-Op Zane Childs, RP           Allergies:  No Known Allergies    Problem List:    Patient Active Problem List   Diagnosis Code    Coronary artery disease involving native coronary artery of native heart without angina pectoris P55.11    Diastolic murmur V84    Family history of early CAD Z80.55    LVH (left ventricular hypertrophy) I51.7    Dyslipidemia E78.5    Former smoker Z87.891    Heart failure with preserved ejection fraction (HCC) I50.30    Hypertension, essential I10    Mitral regurgitation I34.0    Nicotine dependence, chewing tobacco, uncomplicated F50.457    Obesity (BMI 30-39. 9) E66.9    Dysfunction of both eustachian tubes H69.83    Right lateral epicondylitis M77.11    Left anterior cruciate ligament tear S83.512A    Tear of lateral meniscus of left knee, current S83.282A    S/P cardiac cath 2015 mod LAD lesion 30 to 50%- med RX Z98.890    BAILEY (dyspnea on exertion) R06.00    Coronary artery disease involving native coronary artery of native heart I25.10    Palpitations one episode R00.2    COVID U07.1    Pre-op evaluation- hernia repair -for RT sided inguinal hernia Z01.818       Past Medical History:        Diagnosis Date    ASHD (arteriosclerotic heart disease)     Dyslipidemia     Former smoker     GERD (gastroesophageal reflux disease)     Heart attack ([address]     Heart failure with preserved ejection fraction (HCC)     Hx of prior LV dysfunction    Hypertension, essential     Mitral regurgitation     Nicotine dependence, chewing tobacco, uncomplicated     Obesity (BMI 30-39. 9)     S/P coronary artery stent placement     S/P mitral valve repair     Torn meniscus        Past Surgical History:        Procedure Laterality Date    APPENDECTOMY      CARDIAC CATHETERIZATION      COLONOSCOPY  2017 [address]-- medical records could not send anything over 6years old   [facility] Barb Becerra Left 05/13/2016    [facility] [place], [address] SURGERY Left 02/2021    MITRAL VALVE REPAIR      ROTATOR CUFF REPAIR Right     right and left    VENTRAL HERNIA REPAIR  05/13/2016    [place]        Social History:    Social History     Tobacco Use    Smoking status: Never Smoker    Smokeless tobacco: Current User     Types: Chew   Substance Use Topics    Alcohol use: Yes     Comment: socially                                Ready to quit: Not Answered  Counseling given: Not Answered      Vital Signs (Current):   Vitals:    02/04/22 1122 02/04/22 1128   BP: (!) 152/90    Pulse: 79    Resp: 16    Temp: 97.5 °F (36.4 °C)    TempSrc: Temporal    SpO2: 97%    Weight:  192 lb 4 oz (87.2 kg)   Height:  5' 10\" (1.778 m)                                              BP Readings from Last 3 Encounters:   02/04/22 (!) 152/90   01/28/22 135/89   01/20/22 132/88       NPO Status: Time of last liquid consumption: 2000                        Time of last solid consumption: 2000                        Date of last liquid consumption: 02/03/22                        Date of last solid food consumption: 02/03/22    BMI:   Wt Readings from Last 3 Encounters:   02/04/22 192 lb 4 oz (87.2 kg)   01/28/22 200 lb 3.2 oz (90.8 kg)   01/20/22 204 lb 3.2 oz (92.6 kg)     Body mass index is 27.59 kg/m².     CBC:   Lab Results   Component Value Date    WBC 6.6 01/19/2022    RBC 4.82 01/19/2022    HGB 14.9 01/19/2022    HCT 45.4 01/19/2022    MCV 94.2 01/19/2022     01/19/2022       CMP:   Lab Results   Component Value Date     01/19/2022    K 4.2 02/04/2022    K 3.9 12/05/2021     01/19/2022    CO2 25 01/19/2022    BUN 16 01/19/2022    CREATININE 0.8 01/19/2022    LABGLOM >90 01/19/2022    GLUCOSE 108 01/19/2022    PROT 7.1 01/19/2022    CALCIUM 9.7 01/19/2022    BILITOT 0.7 01/19/2022    ALKPHOS 74 01/19/2022    AST 22 01/19/2022    ALT 22 01/19/2022       POC Tests: No results for input(s): POCGLU, POCNA, POCK, POCCL, POCBUN, POCHEMO, POCHCT in the last 72 hours. Coags: No results found for: PROTIME, INR, APTT    HCG (If Applicable): No results found for: PREGTESTUR, PREGSERUM, HCG, HCGQUANT     ABGs: No results found for: PHART, PO2ART, BRX1QOW, YGP0ADR, BEART, Q1RNDJYD     Type & Screen (If Applicable):  No results found for: LABABO, LABRH    Drug/Infectious Status (If Applicable):  No results found for: HIV, HEPCAB    COVID-19 Screening (If Applicable):   Lab Results   Component Value Date    COVID19 DETECTED 01/03/2022           Anesthesia Evaluation  Patient summary reviewed no history of anesthetic complications:   Airway: Mallampati: II  TM distance: >3 FB   Neck ROM: full  Mouth opening: > = 3 FB Dental: normal exam         Pulmonary:normal exam                               Cardiovascular:    (+) hypertension:, past MI:, CAD:, CABG/stent:,                   Neuro/Psych:               GI/Hepatic/Renal:   (+) GERD:,           Endo/Other:                     Abdominal:             Vascular: Other Findings:             Anesthesia Plan      general     ASA 3       Induction: intravenous. MIPS: Postoperative opioids intended and Prophylactic antiemetics administered. Anesthetic plan and risks discussed with patient.       Plan discussed with CRNA and surgical team.                  Vivian Spivey MD   2/4/2022

## 2022-02-04 NOTE — BRIEF OP NOTE
Brief Postoperative Note      Patient: Caitlin Celaya  YOB: 1965  MRN: [id number]    Date of Procedure: 2/4/2022    Pre-Op Diagnosis: RIGHT INGUINAL HERNIA    Post-Op Diagnosis: Large Indirect  With Large Lipoma cord       Procedure Robotic Assisted RIH Repair    Surgeon(s):  Trista Washburn MD    Assistant:  First Assistant: Mustapha Cedillo RN    Anesthesia: General    Estimated Blood Loss (mL): none    Complications: none    Specimens:       Implants:  Implant Name Type Inv.  Item Serial No.  Lot No. LRB No. Used Action   MESH CAMMIE [id number] POLY POLYLACTIC ACID 70% CLLGN 30% GLYC  MESH CAMMIE [id number] POLY POLYLACTIC ACID 70% CLLGN 30% GLYC  [employer] [facility] SURGICAL-WD [id number] Right 1 Implanted         Drains:    Findings: see op note    Electronically signed by Trista Washburn MD on 2/4/2022 at 1:43 PM

## 2022-02-06 NOTE — OP NOTE
[address] [place] [address]                                OPERATIVE REPORT    PATIENT NAME: Ryan Renee                     :        1965  MED REC NO:   [id number]                           ROOM:  ACCOUNT NO:   [de-identified]                           ADMIT DATE: 2022  PROVIDER:     Rufino Pastrana. Sae Tapia MD    DATE OF PROCEDURE:  2022    PREOPERATIVE DIAGNOSIS:  Right inguinal hernia. POSTOPERATIVE DIAGNOSIS:  Large right indirect inguinal hernia with  large cord lipoma. OPERATION:  Robotic-assisted repair of right inguinal hernia. SURGEON:  Rufino Pastrana. MD Kassandra    ANESTHESIA:  General.    COMPLICATIONS:  None. BLOOD LOSS:  10 mL. INDICATIONS FOR PROCEDURE:  The patient is a 72-year-old white male with  a right inguinal hernia. FINDINGS:  He had a very large right indirect inguinal sac and a large  cord lipoma. DESCRIPTION OF PROCEDURE:  The patient was brought into the operating  suite, placed supine on the operating room table. After adequate  inhalational anesthesia was administered, the patient's abdomen was  prepped and draped in usual sterile fashion. Incision was made above  the umbilicus. Veress needle was placed. CO2 was insufflated to a  pressure of 15. Veress needle was removed. A trocar was placed and a  camera was placed through the trocar verifying intra-abdominal placement  of the trocar. The other two robotic trocars were placed laterally. Then, the robot was brought in and docked to the ports. Then, I took my  place at the module. Using a ProGrip and scissors, we made an incision  in the peritoneum, dissected into the preperitoneal space down to the  pubic tubercle.   There was no evidence of a direct hernia and then we  began dissecting a very large indirect sac out of the internal ring  along with a large cord lipoma that was totally  from the cord  and dropped into the abdomen. We then continued to dissect the sac off  the cord. Once this was accomplished, we laid a ProGrip mesh into this  space and uncurled it, giving nice coverage over the direct space and  the internal ring. We then closed the peritoneal flap with running  V-Loc suture incorporating this large hernia sac into the peritoneal  closure. An EndoCatch was placed into the abdominal cavity and the  large lipoma was removed. The robot was undocked from the trocars. They were removed. Interrupted 4-0 Vicryl was used to close the skin. Steri-Strips were applied. YANDY MCCORMICK [facility], MD    D: 02/06/2022 11:08:19       T: 02/06/2022 11:11:47     [email or web address]  Job#: [id number]     Doc#: [id number]    CC:

## 2022-02-15 ENCOUNTER — OFFICE VISIT (OUTPATIENT)
Dept: SURGERY | Age: 57
End: 2022-02-15

## 2022-02-15 VITALS
DIASTOLIC BLOOD PRESSURE: 82 MMHG | TEMPERATURE: 97.5 F | HEIGHT: 70 IN | OXYGEN SATURATION: 99 % | WEIGHT: 199.5 LBS | RESPIRATION RATE: 18 BRPM | BODY MASS INDEX: 28.56 KG/M2 | SYSTOLIC BLOOD PRESSURE: 124 MMHG | HEART RATE: 75 BPM

## 2022-02-15 DIAGNOSIS — Z09 POSTOPERATIVE EXAMINATION: Primary | ICD-10-CM

## 2022-02-15 PROCEDURE — 99024 POSTOP FOLLOW-UP VISIT: CPT | Performed by: NURSE PRACTITIONER

## 2022-02-15 ASSESSMENT — ENCOUNTER SYMPTOMS
RESPIRATORY NEGATIVE: 1
NAUSEA: 0
VOMITING: 0
ABDOMINAL PAIN: 1

## 2022-02-15 NOTE — PROGRESS NOTES
02/2021    MITRAL VALVE REPAIR      ROTATOR CUFF REPAIR Right     right and left    VENTRAL HERNIA REPAIR  05/13/2016    Debra Peper     Family History   Problem Relation Age of Onset    Diabetes Mother     Mitral Valve Prolapse Mother     Heart Attack Mother     Obesity Mother     No Known Problems Father     No Known Problems Sister     No Known Problems Sister     Thyroid Disease Sister     Thyroid Disease Sister     No Known Problems Brother     No Known Problems Maternal Grandmother     No Known Problems Maternal Grandfather     No Known Problems Paternal Grandmother     Alzheimer's Disease Paternal Grandfather     Colon Cancer Neg Hx     Prostate Cancer Neg Hx      Social History     Tobacco Use    Smoking status: Never Smoker    Smokeless tobacco: Current User     Types: Chew   Substance Use Topics    Alcohol use: Yes     Comment: socially        Current Outpatient Medications   Medication Sig Dispense Refill    Multiple Vitamins-Minerals (THERAPEUTIC MULTIVITAMIN-MINERALS) tablet Take 1 tablet by mouth daily      atorvastatin (LIPITOR) 20 MG tablet TAKE 1 TABLET DAILY 90 tablet 3    lisinopril (PRINIVIL;ZESTRIL) 20 MG tablet TAKE 1 TABLET DAILY 90 tablet 3    BYSTOLIC 2.5 MG tablet TAKE 1 TABLET DAILY 90 tablet 0    fluticasone (FLONASE) 50 MCG/ACT nasal spray USE 1 SPRAY IN EACH NOSTRIL DAILY 16 g 5    albuterol sulfate HFA (VENTOLIN HFA) 108 (90 Base) MCG/ACT inhaler Inhale 2 puffs into the lungs 4 times daily as needed for Wheezing 1 Inhaler 0    amLODIPine (NORVASC) 10 MG tablet Take 1 tablet by mouth daily      aspirin EC 81 MG EC tablet Take 1 tablet by mouth daily 90 tablet 1     No current facility-administered medications for this visit. No Known Allergies    Subjective:      Review of Systems   Constitutional: Positive for activity change. Negative for appetite change and fever. HENT: Negative. Respiratory: Negative. Cardiovascular: Negative. Gastrointestinal: Positive for abdominal pain. Negative for nausea and vomiting. Genitourinary: Positive for scrotal swelling (improved ). Negative for difficulty urinating. Right groin burning and pulling    Musculoskeletal: Negative. Skin: Positive for wound (incisions ). Neurological: Negative. Objective:     /82 (Site: Left Upper Arm, Position: Sitting, Cuff Size: Medium Adult)   Pulse 75   Temp 97.5 °F (36.4 °C) (Temporal)   Resp 18   Ht 5' 10\" (1.778 m)   Wt 199 lb 8 oz (90.5 kg)   SpO2 99%   BMI 28.63 kg/m²     Wt Readings from Last 3 Encounters:   02/15/22 199 lb 8 oz (90.5 kg)   02/04/22 192 lb 4 oz (87.2 kg)   01/28/22 200 lb 3.2 oz (90.8 kg)       Physical Exam  Vitals reviewed. Constitutional:       Appearance: He is well-developed. HENT:      Head: Normocephalic. Eyes:      Pupils: Pupils are equal, round, and reactive to light. Cardiovascular:      Rate and Rhythm: Normal rate. Pulmonary:      Effort: Pulmonary effort is normal.   Abdominal:      Palpations: Abdomen is soft. Musculoskeletal:         General: Normal range of motion. Cervical back: Normal range of motion. Skin:     General: Skin is warm and dry. Neurological:      Mental Status: He is alert and oriented to person, place, and time. Assessment/Plan:     Rick Contreras was seen today for post-op check. Diagnoses and all orders for this visit:    Postoperative examination        Return in 2 weeks (on 3/1/2022). Patient Instructions     Return to work date is March 21  No heavy lifting greater then 10-15 lbs   Okay for travel         Discusseduse, benefit, and side effects of prescribed medications. All patient questionsanswered. Pt voiced understanding.      Electronically signed by TAMELA Vergara CNP on 2/15/2022 at 1:11 PM

## 2022-02-27 PROBLEM — Z01.818 PRE-OP EVALUATION: Status: RESOLVED | Noted: 2022-01-28 | Resolved: 2022-02-27

## 2022-03-01 ENCOUNTER — OFFICE VISIT (OUTPATIENT)
Dept: SURGERY | Age: 57
End: 2022-03-01

## 2022-03-01 VITALS
SYSTOLIC BLOOD PRESSURE: 126 MMHG | BODY MASS INDEX: 28.88 KG/M2 | HEIGHT: 70 IN | DIASTOLIC BLOOD PRESSURE: 70 MMHG | RESPIRATION RATE: 18 BRPM | TEMPERATURE: 97.6 F | WEIGHT: 201.7 LBS | HEART RATE: 70 BPM | OXYGEN SATURATION: 97 %

## 2022-03-01 DIAGNOSIS — Z09 POSTOPERATIVE EXAMINATION: Primary | ICD-10-CM

## 2022-03-01 PROCEDURE — 99024 POSTOP FOLLOW-UP VISIT: CPT | Performed by: NURSE PRACTITIONER

## 2022-03-01 ASSESSMENT — ENCOUNTER SYMPTOMS
BLOOD IN STOOL: 0
NAUSEA: 0
EYE PAIN: 0
VOICE CHANGE: 0
SORE THROAT: 0
TROUBLE SWALLOWING: 0
EYE REDNESS: 0
FACIAL SWELLING: 0
VOMITING: 0
SHORTNESS OF BREATH: 0
EYE ITCHING: 0
CHOKING: 0
BACK PAIN: 0
WHEEZING: 0
COUGH: 0
ABDOMINAL DISTENTION: 0
COLOR CHANGE: 0
RHINORRHEA: 0
ANAL BLEEDING: 0
RECTAL PAIN: 0
DIARRHEA: 0
PHOTOPHOBIA: 0
EYE DISCHARGE: 0
ABDOMINAL PAIN: 0
APNEA: 0
CHEST TIGHTNESS: 0
CONSTIPATION: 0
STRIDOR: 0
SINUS PRESSURE: 0

## 2022-03-01 NOTE — LETTER
[address] [facility]  [place] [address] [address] [facility] [address]  Phone: [phone]  Fax: [phone]    TAMELA Storm CNP        March 1, 2022     Patient: Gian Dickerson   YOB: 1965   Date of Visit: 3/1/2022       To Whom It May Concern: It is my medical opinion that Chris Caal may return to work on Monday March 7 with no restrictions. If you have any questions or concerns, please don't hesitate to call.     Sincerely,        TAMELA Storm CNP

## 2022-03-01 NOTE — PROGRESS NOTES
[address] [facility] [address]  Dept: [phone]  Dept Fax: [phone]  Loc: [phone]    Visit Date: 3/1/2022       Constanza Liu is a 55 y.o. male who presents today for:  Chief Complaint   Patient presents with    Post-Op Check     s/p Robotic-assisted repair of right inguinal hernia on 2-4-22       HPI:     Pauline Merino presents today for a post op check. Today He has no complaints. All surgical discomfort has resolved. No issues with scrotal swelling or testicular pain. He is voiding well. No issues with bowels and tolerating meals. He would like to return to work on Monday. Okay to return to work Monday. Patient understands Good body mechanics when lifting, encouraged assistance when needed to avoid recurrence. Verbalized understanding of all instruction. Call with concerns. Incisions look good, he is non tender       Past Medical History:   Diagnosis Date    ASHD (arteriosclerotic heart disease)    [facility] Dyslipidemia     Former smoker     GERD (gastroesophageal reflux disease)     Heart attack ([address]     Heart failure with preserved ejection fraction (HCC)     Hx of prior LV dysfunction    Hypertension, essential     Mitral regurgitation     Nicotine dependence, chewing tobacco, uncomplicated     Obesity (BMI 30-39. 9)     S/P coronary artery stent placement     S/P mitral valve repair     Torn meniscus       Past Surgical History:   Procedure Laterality Date    APPENDECTOMY      CARDIAC CATHETERIZATION      COLONOSCOPY  2017 [address]-- medical records could not send anything over 6years old   Merissa Rangel Right 2/4/2022    ROBOTIC RIGHT INGUINAL HERNIA REPAIR WITH MESH performed by Sofi Condon MD at [address] Left 05/13/2016    [facility], [address] [place] [address] SURGERY Left 02/2021    MITRAL VALVE REPAIR      ROTATOR CUFF REPAIR Right     right and left    VENTRAL HERNIA REPAIR  05/13/2016    Mónicakarla Beaverser     Family History   Problem Relation Age of Onset    Diabetes Mother     Mitral Valve Prolapse Mother     Heart Attack Mother     Obesity Mother     No Known Problems Father     No Known Problems Sister     No Known Problems Sister     Thyroid Disease Sister     Thyroid Disease Sister     No Known Problems Brother     No Known Problems Maternal Grandmother     No Known Problems Maternal Grandfather     No Known Problems Paternal Grandmother     Alzheimer's Disease Paternal Grandfather     Colon Cancer Neg Hx     Prostate Cancer Neg Hx      Social History     Tobacco Use    Smoking status: Never Smoker    Smokeless tobacco: Current User     Types: Chew   Substance Use Topics    Alcohol use: Yes     Comment: socially        Current Outpatient Medications   Medication Sig Dispense Refill    Multiple Vitamins-Minerals (THERAPEUTIC MULTIVITAMIN-MINERALS) tablet Take 1 tablet by mouth daily      atorvastatin (LIPITOR) 20 MG tablet TAKE 1 TABLET DAILY 90 tablet 3    lisinopril (PRINIVIL;ZESTRIL) 20 MG tablet TAKE 1 TABLET DAILY 90 tablet 3    BYSTOLIC 2.5 MG tablet TAKE 1 TABLET DAILY 90 tablet 0    fluticasone (FLONASE) 50 MCG/ACT nasal spray USE 1 SPRAY IN EACH NOSTRIL DAILY 16 g 5    albuterol sulfate HFA (VENTOLIN HFA) 108 (90 Base) MCG/ACT inhaler Inhale 2 puffs into the lungs 4 times daily as needed for Wheezing 1 Inhaler 0    amLODIPine (NORVASC) 10 MG tablet Take 1 tablet by mouth daily      aspirin EC 81 MG EC tablet Take 1 tablet by mouth daily 90 tablet 1     No current facility-administered medications for this visit. No Known Allergies    Subjective:      Review of Systems   Constitutional: Negative for activity change, appetite change, chills, diaphoresis, fatigue, fever and unexpected weight change.    HENT: Negative for congestion, dental problem, drooling, ear discharge, ear pain, facial swelling, hearing loss, mouth sores, nosebleeds, postnasal drip, rhinorrhea, sinus pressure, sneezing, sore throat, tinnitus, trouble swallowing and voice change. Eyes: Negative for photophobia, pain, discharge, redness, itching and visual disturbance. Respiratory: Negative for apnea, cough, choking, chest tightness, shortness of breath, wheezing and stridor. Cardiovascular: Negative for chest pain, palpitations and leg swelling. Gastrointestinal: Negative for abdominal distention, abdominal pain, anal bleeding, blood in stool, constipation, diarrhea, nausea, rectal pain and vomiting. Endocrine: Negative for cold intolerance, heat intolerance, polydipsia, polyphagia and polyuria. Genitourinary: Negative for decreased urine volume, difficulty urinating, dysuria, enuresis, flank pain, frequency, genital sores, hematuria and urgency. Musculoskeletal: Negative for arthralgias, back pain, gait problem, joint swelling, myalgias, neck pain and neck stiffness. Skin: Negative for color change, pallor, rash and wound. Allergic/Immunologic: Negative for environmental allergies, food allergies and immunocompromised state. Neurological: Negative for dizziness, tremors, seizures, syncope, facial asymmetry, speech difficulty, weakness, light-headedness, numbness and headaches. Hematological: Negative for adenopathy. Does not bruise/bleed easily. Psychiatric/Behavioral: Negative for agitation, behavioral problems, confusion, decreased concentration, dysphoric mood, hallucinations, self-injury, sleep disturbance and suicidal ideas. The patient is not nervous/anxious and is not hyperactive.         Objective:     /70 (Site: Left Upper Arm, Position: Sitting, Cuff Size: Medium Adult)   Pulse 70   Temp 97.6 °F (36.4 °C) (Temporal)   Resp 18   Ht 5' 10\" (1.778 m)   Wt 201 lb 11.2 oz (91.5 kg)   SpO2 97%   BMI 28.94 kg/m²     Wt Readings from Last 3 Encounters:   03/01/22 201 lb 11.2 oz (91.5 kg) 02/15/22 199 lb 8 oz (90.5 kg)   02/04/22 192 lb 4 oz (87.2 kg)       Physical Exam  Vitals reviewed. Constitutional:       Appearance: He is well-developed. HENT:      Head: Normocephalic. Eyes:      Pupils: Pupils are equal, round, and reactive to light. Cardiovascular:      Rate and Rhythm: Normal rate. Pulmonary:      Effort: Pulmonary effort is normal.   Abdominal:      Palpations: Abdomen is soft. Musculoskeletal:         General: Normal range of motion. Cervical back: Normal range of motion. Skin:     General: Skin is warm and dry. Neurological:      Mental Status: He is alert and oriented to person, place, and time. Assessment/Plan:     Gabino Blount was seen today for post-op check. Diagnoses and all orders for this visit:    Postoperative examination        Return if symptoms worsen or fail to improve. Patient Instructions   Good body mechanics when lifting, encouraged assistance when needed to avoid recurrence       Discusseduse, benefit, and side effects of prescribed medications. All patient questionsanswered. Pt voiced understanding.      Electronically signed by TAMELA Perez CNP on 3/1/2022 at 4:29 PM

## 2022-07-28 ENCOUNTER — OFFICE VISIT (OUTPATIENT)
Dept: CARDIOLOGY CLINIC | Age: 57
End: 2022-07-28
Payer: COMMERCIAL

## 2022-07-28 VITALS
BODY MASS INDEX: 30.69 KG/M2 | DIASTOLIC BLOOD PRESSURE: 87 MMHG | HEIGHT: 69 IN | HEART RATE: 69 BPM | WEIGHT: 207.2 LBS | SYSTOLIC BLOOD PRESSURE: 127 MMHG

## 2022-07-28 DIAGNOSIS — Z98.890 S/P CARDIAC CATH: ICD-10-CM

## 2022-07-28 DIAGNOSIS — I25.10 CORONARY ARTERY DISEASE INVOLVING NATIVE CORONARY ARTERY OF NATIVE HEART WITHOUT ANGINA PECTORIS: Primary | ICD-10-CM

## 2022-07-28 DIAGNOSIS — I10 HYPERTENSION, ESSENTIAL: Chronic | ICD-10-CM

## 2022-07-28 DIAGNOSIS — E78.5 DYSLIPIDEMIA: Chronic | ICD-10-CM

## 2022-07-28 DIAGNOSIS — R06.09 DOE (DYSPNEA ON EXERTION): ICD-10-CM

## 2022-07-28 PROCEDURE — 99214 OFFICE O/P EST MOD 30 MIN: CPT | Performed by: INTERNAL MEDICINE

## 2022-07-28 RX ORDER — NEBIVOLOL 2.5 MG/1
TABLET ORAL
Qty: 90 TABLET | Refills: 3 | Status: SHIPPED | OUTPATIENT
Start: 2022-07-28

## 2022-07-28 RX ORDER — AMLODIPINE BESYLATE 10 MG/1
10 TABLET ORAL DAILY
Qty: 90 TABLET | Refills: 3 | Status: SHIPPED | OUTPATIENT
Start: 2022-07-28

## 2022-07-28 RX ORDER — LISINOPRIL 20 MG/1
TABLET ORAL
Qty: 90 TABLET | Refills: 3 | Status: SHIPPED | OUTPATIENT
Start: 2022-07-28

## 2022-07-28 RX ORDER — ATORVASTATIN CALCIUM 20 MG/1
TABLET, FILM COATED ORAL
Qty: 90 TABLET | Refills: 3 | Status: SHIPPED | OUTPATIENT
Start: 2022-07-28

## 2022-07-28 NOTE — PROGRESS NOTES
Chief Complaint   Patient presents with    6 Month Follow-Up    Coronary Artery Disease   originally  patient moved from [facility]  ? ? ? Hx of Mitral valve repair/ clip for MR 5 yrs back- transcathater- per pat ( not confirmed)  ? ? ? Hx of coronary stent 2 5 yrs back per pat ( Not confirmed from record)  Record from outside  Reviewed and the above claim of the pat could be verified        6 month follow up. EKG done 1-. Denied  Chest pain , palpitation, dizziness or edema    Felton - chronic    No more chest pain      Nonsmoker    FHX  Mother had valve procedure, had stent at 72    Patient Active Problem List   Diagnosis    Coronary artery disease involving native coronary artery of native heart without angina pectoris    Diastolic murmur    Family history of early CAD    Non-recurrent unilateral inguinal hernia without obstruction or gangrene    LVH (left ventricular hypertrophy)    Dyslipidemia    Former smoker    Heart failure with preserved ejection fraction (HCC)    Hypertension, essential    Mitral regurgitation    Nicotine dependence, chewing tobacco, uncomplicated    Obesity (BMI 30-39. 9)    Dysfunction of both eustachian tubes    Right lateral epicondylitis    Left anterior cruciate ligament tear    Tear of lateral meniscus of left knee, current    S/P cardiac cath 2015 mod LAD lesion 30 to 50%- med RX    FELTON (dyspnea on exertion)    Coronary artery disease involving native coronary artery of native heart    Palpitations one episode    COVID       Past Surgical History:   Procedure Laterality Date    APPENDECTOMY      CARDIAC CATHETERIZATION      COLONOSCOPY  2017    [place]-- medical records could not send anything over 6years old    [address] Dr. Karla Moe Right 2/4/2022    ROBOTIC RIGHT INGUINAL [address] performed by Giselle Rivers MD at [facility]  Left 05/13/2016    [facility], [address] Left 02/2021    MITRAL VALVE REPAIR      ROTATOR CUFF REPAIR Right     right and left    VENTRAL HERNIA REPAIR  05/13/2016 [address]       No Known Allergies     Family History   Problem Relation Age of Onset    Diabetes Mother     Mitral Valve Prolapse Mother     Heart Attack Mother     Obesity Mother     No Known Problems Father     No Known Problems Sister     No Known Problems Sister     Thyroid Disease Sister     Thyroid Disease Sister     No Known Problems Brother     No Known Problems Maternal Grandmother     No Known Problems Maternal Grandfather     No Known Problems Paternal Grandmother     Alzheimer's Disease Paternal Grandfather     Colon Cancer Neg Hx     Prostate Cancer Neg Hx         Social History     Socioeconomic History    Marital status:      Spouse name: Not on file    Number of children: Not on file    Years of education: Not on file    Highest education level: Not on file   Occupational History    Not on file   Tobacco Use    Smoking status: Never    Smokeless tobacco: Current     Types: Chew   Vaping Use    Vaping Use: Never used   Substance and Sexual Activity    Alcohol use: Yes     Comment: socially    Drug use: Never    Sexual activity: Not on file   Other Topics Concern    Not on file   Social History Narrative    Not on file     Social Determinants of Health     Financial Resource Strain: Not on file   Food Insecurity: Not on file   Transportation Needs: Not on file   Physical Activity: Not on file   Stress: Not on file   Social Connections: Not on file   Intimate Partner Violence: Not on file   Housing Stability: Not on file       Current Outpatient Medications   Medication Sig Dispense Refill    lisinopril (PRINIVIL;ZESTRIL) 20 MG tablet TAKE 1 TABLET DAILY 90 tablet 3    nebivolol (BYSTOLIC) 2.5 MG tablet TAKE 1 TABLET DAILY 90 tablet 3    atorvastatin (LIPITOR) 20 MG tablet TAKE 1 TABLET DAILY 90 tablet 3    amLODIPine (NORVASC) 10 MG tablet Take 1 tablet by mouth in the morning.  90 tablet 3 Multiple Vitamins-Minerals (THERAPEUTIC MULTIVITAMIN-MINERALS) tablet Take 1 tablet by mouth daily      fluticasone (FLONASE) 50 MCG/ACT nasal spray USE 1 SPRAY IN EACH NOSTRIL DAILY 16 g 5    albuterol sulfate HFA (VENTOLIN HFA) 108 (90 Base) MCG/ACT inhaler Inhale 2 puffs into the lungs 4 times daily as needed for Wheezing 1 Inhaler 0    aspirin EC 81 MG EC tablet Take 1 tablet by mouth daily 90 tablet 1     No current facility-administered medications for this visit. Review of Systems -     General ROS: negative  Psychological ROS: negative  Hematological and Lymphatic ROS: No history of blood clots or bleeding disorder. Respiratory ROS: no cough,  or wheezing, the rest see HPI  Cardiovascular ROS: See HPI  Gastrointestinal ROS: negative  Genito-Urinary ROS: no dysuria, trouble voiding, or hematuria  Musculoskeletal ROS: negative  Neurological ROS: no TIA or stroke symptoms  Dermatological ROS: negative      Blood pressure 127/87, pulse 69, height 5' 9\" (1.753 m), weight 207 lb 3.2 oz (94 kg).         Physical Examination:    General appearance - alert, well appearing, and in no distress  HEENT- Pink conjunctiva  , Non-icteri sclera,PERRLA  Mental status - alert, oriented to person, place, and time  Neck - supple, no significant adenopathy, no JVD, or carotid bruits  Chest - clear to auscultation, no wheezes, rales or rhonchi, symmetric air entry  Heart - normal rate, regular rhythm, normal S1, S2, no murmurs, rubs, clicks or gallops  Abdomen - soft, nontender, nondistended, no masses or organomegaly  DAVI- no CVA or flank tenderness, no suprapubic tenderness  Neurological - alert, oriented, normal speech, no focal findings or movement disorder noted  Musculoskeletal/limbs - no joint tenderness, deformity or swelling   - peripheral pulses normal, no pedal edema, no clubbing or cyanosis  Skin - normal coloration and turgor, no rashes, no suspicious skin lesions noted  Psych- appropriate mood and affect    Lab  No results for input(s): CKTOTAL, CKMB, CKMBINDEX, TROPONINI in the last 72 hours. CBC:   Lab Results   Component Value Date/Time    WBC 6.6 01/19/2022 12:35 PM    RBC 4.82 01/19/2022 12:35 PM    HGB 14.9 01/19/2022 12:35 PM    HCT 45.4 01/19/2022 12:35 PM    MCV 94.2 01/19/2022 12:35 PM    MCH 30.9 01/19/2022 12:35 PM    MCHC 32.8 01/19/2022 12:35 PM     01/19/2022 12:35 PM    MPV 8.3 01/19/2022 12:35 PM     BMP:    Lab Results   Component Value Date/Time     01/19/2022 12:35 PM    K 4.2 02/04/2022 11:27 AM    K 3.9 12/05/2021 09:04 PM     01/19/2022 12:35 PM    CO2 25 01/19/2022 12:35 PM    BUN 16 01/19/2022 12:35 PM    LABALBU 4.0 01/19/2022 12:35 PM    CREATININE 0.8 01/19/2022 12:35 PM    CALCIUM 9.7 01/19/2022 12:35 PM    LABGLOM >90 01/19/2022 12:35 PM    GLUCOSE 108 01/19/2022 12:35 PM     Hepatic Function Panel:    Lab Results   Component Value Date/Time    ALKPHOS 74 01/19/2022 12:35 PM    ALT 22 01/19/2022 12:35 PM    AST 22 01/19/2022 12:35 PM    PROT 7.1 01/19/2022 12:35 PM    BILITOT 0.7 01/19/2022 12:35 PM    BILIDIR <0.2 02/09/2021 09:35 AM    LABALBU 4.0 01/19/2022 12:35 PM     Magnesium:    Lab Results   Component Value Date/Time    MG 2.1 10/29/2020 12:50 AM     Warfarin PT/INR:  No components found for: PTPATWAR, PTINRWAR  HgBA1c:  No results found for: LABA1C  FLP:    Lab Results   Component Value Date/Time    TRIG 87 02/09/2021 09:35 AM    HDL 55 02/09/2021 09:35 AM    LDLCALC 76 02/09/2021 09:35 AM     TSH:  No results found for: TSH      Reviewed the record from outside [address] [place] [address] Ln cath Dec 30, 2015 at [id number]  27, [address] [facility] [place] < [address] [address]  ]EF 65%  LAD : 30-50%  LCx/RCA: Normal  RCA could not be accessed radially- had to be accessed through femoral access    Electronically signed by Payal Abel MD at 12/30/2015 11:37 AM EST      Mid LAD lesion 50% stenosed. Final Impression:        1. Coronary artery disease as described above    2.   Mild to moderate Mildly dilated. Signature      ----------------------------------------------------------------   Electronically signed by Marya Desir MD (Interpreting   physician) on 12/16/2021 at 06:52 PM   ----------------------------------------------------------------       Conclusions      Summary   Lexiscan EKG stress test is not suggestive for ischemia. The nuclear images is not suggestive for myocardial ischemia. Signatures      ----------------------------------------------------------------   Electronically signed by Marya Desir MD (Interpreting   Cardiologist) on 01/28/2021 at 19:12          Peak HR:147 bpm                    HR response: Appropriate    Peak BP:176/80 mmHg                BP response: Normal Resting BP with    Predicted HR: 165 bpm              appropriate response to Stress    % of predicted HR: 89              HR/BP product:[id number]    Test duration:12 min               Max exercise: 14.4 METS    Reason for termination:Target HR    achieved                           Exercise effort:Excellent         Conclusions        Summary    Exercise EKG stress test is not suggestive for ischemia. The nuclear images is not suggestive for myocardial ischemia. Signatures        ----------------------------------------------------------------    Electronically signed by Marya Desir MD (Interpreting    Cardiologist) on 12/16/2021 at 19:23    ----------------------------------------------------------------         ekg 1/22/21  Sinus  Bradycardia   WITHIN NORMAL LIMITS    ekg 12/7/21  Sinus bradycardia Otherwise normal ECG When compared with ECG of 29-OCT-2020 00:30, No significant change was found Confirmed by ELENITA Garcia MD (3    Troponinn neg x2    Assessment    ? ? ? Hx valve procedure mitral valve ( could not be verified from the record)     Diagnosis Orders   1. Coronary artery disease involving native coronary artery of native heart without angina pectoris        2.  Hypertension, essential 3. Dyslipidemia        4. S/P cardiac cath 2015 mod LAD lesion 30 to 50%- med RX        5. BAILEY (dyspnea on exertion)                Plan   The   current meds and labs reviewed    Continue the current treatment and with constant vigilance to changes in symptoms and also any potential side effects. Return for care or seek medical attention immediately if symptoms got worse and/or develop new symptoms. Hypertension, on medical treatment. Seems to be under good control. Patient is compliant with medical treatment. Hyperlipidemia: on statins, followed periodically. Patient need periodic lipid and liver profile. Coronary artery disease, seems to be stable. Denies angina or change in breathing pattern  Coronary artery disease, mod non-obst  Never had any intervention from the record from cardiac stand point  But moderate nonobstructive CAD noted  Cont asa and statins  Asa 81 mg po qd  Metoprolol stopped In [place] due to ED per pat  Now on Bystolic    Echo- WNL   report from  [facility] reviewed   NO stent noted   No Mitral valve surgery Noted from records  No Hx of cardiac or coronary intervention  Echo - WNL  Ex nux stress - negative  Exercise tolerance good METS 14    Overall pat is Stable and doing well    D/w the pat the plan of care details    Lipid panel and liver function test before next appointment      patient is advised to exercise 30 min s a day three times a week and about weight loss ,balance diet and     More fruits and vegetables . Discussed use, benefit, and side effects of prescribed medications. All patient questions answered. Pt voiced understanding. Instructed to continue current medications, diet and exercise. Continue risk factor modification and medical management. Patient agreed with treatment plan. Follow up as directed.       RTC in 6 months    [facility]ug[facility]

## 2022-08-01 DIAGNOSIS — J30.89 NON-SEASONAL ALLERGIC RHINITIS, UNSPECIFIED TRIGGER: ICD-10-CM

## 2022-08-01 DIAGNOSIS — K40.90 NON-RECURRENT UNILATERAL INGUINAL HERNIA WITHOUT OBSTRUCTION OR GANGRENE: Primary | ICD-10-CM

## 2022-08-01 RX ORDER — FLUTICASONE PROPIONATE 50 MCG
SPRAY, SUSPENSION (ML) NASAL
Qty: 3 EACH | Refills: 3 | Status: SHIPPED | OUTPATIENT
Start: 2022-08-01

## 2023-01-05 ENCOUNTER — PATIENT MESSAGE (OUTPATIENT)
Dept: FAMILY MEDICINE CLINIC | Age: 58
End: 2023-01-05

## 2023-01-05 DIAGNOSIS — F17.220 NICOTINE DEPENDENCE, CHEWING TOBACCO, UNCOMPLICATED: Primary | ICD-10-CM

## 2023-01-05 RX ORDER — VARENICLINE TARTRATE 1 MG/1
1 TABLET, FILM COATED ORAL 2 TIMES DAILY
Qty: 60 TABLET | Refills: 0 | Status: SHIPPED | OUTPATIENT
Start: 2023-01-05

## 2023-01-05 RX ORDER — VARENICLINE TARTRATE 0.5 MG/1
.5-1 TABLET, FILM COATED ORAL SEE ADMIN INSTRUCTIONS
Qty: 57 TABLET | Refills: 0 | Status: CANCELLED | OUTPATIENT
Start: 2023-01-05

## 2023-01-05 RX ORDER — VARENICLINE TARTRATE 0.5 MG/1
.5-1 TABLET, FILM COATED ORAL SEE ADMIN INSTRUCTIONS
Qty: 57 TABLET | Refills: 0 | Status: SHIPPED | OUTPATIENT
Start: 2023-01-05

## 2023-01-05 RX ORDER — VARENICLINE TARTRATE 1 MG/1
1 TABLET, FILM COATED ORAL 2 TIMES DAILY
Qty: 60 TABLET | Refills: 0 | Status: CANCELLED | OUTPATIENT
Start: 2023-01-05

## 2023-01-05 NOTE — TELEPHONE ENCOUNTER
From: Gwyneth Dance  To: Dr. Juan Ramirez: 1/5/2023 8:19 AM EST  Subject: Stop chewing    Good morning. I am wanting to stop chewing and would like to try chantex. Is that the only available medication to help? Any suggestions would be appreciated. And do I need to come for a visit to get a prescription? Thank you!   Danelle Blood

## 2023-01-11 RX ORDER — NEBIVOLOL HYDROCHLORIDE 2.5 MG/1
TABLET ORAL
Qty: 90 TABLET | Refills: 0 | Status: SHIPPED | OUTPATIENT
Start: 2023-01-11

## 2023-01-11 NOTE — TELEPHONE ENCOUNTER
----- Message from Snow Jackson sent at 1/11/2023 12:45 PM EST -----  Regarding: Bystolic   I have been getting a generic for Bystolic since my company switched pharmacies, the generic is causing an unwanted condition, I have tried the generic before with the same result, I need the actual Bystolic and not a generic. Please correct that immediately with my pharmacy. Thank you!

## 2023-01-11 NOTE — TELEPHONE ENCOUNTER
Script pended for pharmacy to fill ROQUE. Spoke to patient. Patient states it's nothing life threatening. Just causing male issues when taking generic.

## 2023-01-26 ENCOUNTER — OFFICE VISIT (OUTPATIENT)
Dept: CARDIOLOGY CLINIC | Age: 58
End: 2023-01-26
Payer: COMMERCIAL

## 2023-01-26 ENCOUNTER — HOSPITAL ENCOUNTER (OUTPATIENT)
Age: 58
Discharge: HOME | End: 2023-01-26
Payer: COMMERCIAL

## 2023-01-26 VITALS
BODY MASS INDEX: 30.61 KG/M2 | HEART RATE: 56 BPM | DIASTOLIC BLOOD PRESSURE: 79 MMHG | SYSTOLIC BLOOD PRESSURE: 134 MMHG | HEIGHT: 70 IN | WEIGHT: 213.8 LBS

## 2023-01-26 DIAGNOSIS — E78.5 DYSLIPIDEMIA: Chronic | ICD-10-CM

## 2023-01-26 DIAGNOSIS — I25.10 CORONARY ARTERY DISEASE INVOLVING NATIVE CORONARY ARTERY OF NATIVE HEART WITHOUT ANGINA PECTORIS: ICD-10-CM

## 2023-01-26 DIAGNOSIS — Z98.890 S/P CARDIAC CATH: ICD-10-CM

## 2023-01-26 DIAGNOSIS — I51.7 LVH (LEFT VENTRICULAR HYPERTROPHY): ICD-10-CM

## 2023-01-26 DIAGNOSIS — I25.10 CORONARY ARTERY DISEASE INVOLVING NATIVE CORONARY ARTERY OF NATIVE HEART WITHOUT ANGINA PECTORIS: Primary | ICD-10-CM

## 2023-01-26 DIAGNOSIS — I10 HYPERTENSION, ESSENTIAL: Chronic | ICD-10-CM

## 2023-01-26 PROBLEM — R06.09 DOE (DYSPNEA ON EXERTION): Status: RESOLVED | Noted: 2021-01-22 | Resolved: 2023-01-26

## 2023-01-26 PROBLEM — R00.2 PALPITATIONS: Status: RESOLVED | Noted: 2021-12-07 | Resolved: 2023-01-26

## 2023-01-26 LAB
ALBUMIN SERPL BCG-MCNC: 4.3 G/DL (ref 3.5–5.1)
ALP SERPL-CCNC: 76 U/L (ref 38–126)
ALT SERPL W/O P-5'-P-CCNC: 21 U/L (ref 11–66)
ANION GAP SERPL CALC-SCNC: 11 MEQ/L (ref 8–16)
AST SERPL-CCNC: 22 U/L (ref 5–40)
BILIRUB CONJ SERPL-MCNC: < 0.2 MG/DL (ref 0–0.3)
BILIRUB SERPL-MCNC: 0.4 MG/DL (ref 0.3–1.2)
BUN SERPL-MCNC: 23 MG/DL (ref 7–22)
CALCIUM SERPL-MCNC: 9 MG/DL (ref 8.5–10.5)
CHLORIDE SERPL-SCNC: 102 MEQ/L (ref 98–111)
CHOLEST SERPL-MCNC: 159 MG/DL (ref 100–199)
CO2 SERPL-SCNC: 23 MEQ/L (ref 23–33)
CREAT SERPL-MCNC: 1.2 MG/DL (ref 0.4–1.2)
GFR SERPL CREATININE-BSD FRML MDRD: > 60 ML/MIN/1.73M2
GLUCOSE SERPL-MCNC: 100 MG/DL (ref 70–108)
HDLC SERPL-MCNC: 51 MG/DL
LDLC SERPL CALC-MCNC: 81 MG/DL
MAGNESIUM SERPL-MCNC: 1.8 MG/DL (ref 1.6–2.4)
POTASSIUM SERPL-SCNC: 4.8 MEQ/L (ref 3.5–5.2)
PROT SERPL-MCNC: 6.4 G/DL (ref 6.1–8)
SODIUM SERPL-SCNC: 136 MEQ/L (ref 135–145)
TRIGL SERPL-MCNC: 133 MG/DL (ref 0–199)

## 2023-01-26 PROCEDURE — 3078F DIAST BP <80 MM HG: CPT | Performed by: INTERNAL MEDICINE

## 2023-01-26 PROCEDURE — 93000 ELECTROCARDIOGRAM COMPLETE: CPT | Performed by: INTERNAL MEDICINE

## 2023-01-26 PROCEDURE — 36415 COLL VENOUS BLD VENIPUNCTURE: CPT

## 2023-01-26 PROCEDURE — 82248 BILIRUBIN DIRECT: CPT

## 2023-01-26 PROCEDURE — 3075F SYST BP GE 130 - 139MM HG: CPT | Performed by: INTERNAL MEDICINE

## 2023-01-26 PROCEDURE — 99214 OFFICE O/P EST MOD 30 MIN: CPT | Performed by: INTERNAL MEDICINE

## 2023-01-26 PROCEDURE — 83735 ASSAY OF MAGNESIUM: CPT

## 2023-01-26 PROCEDURE — 80053 COMPREHEN METABOLIC PANEL: CPT

## 2023-01-26 PROCEDURE — 80061 LIPID PANEL: CPT

## 2023-01-26 NOTE — PROGRESS NOTES
Chief Complaint   Patient presents with    6 Month Follow-Up   originally  patient moved from [place]  ???Hx of Mitral valve repair/ clip for MR 5 yrs back- transcathater- per pat ( not confirmed)  ???Hx of coronary stent 2 5 yrs back per pat ( Not confirmed from record)  Record from outside  Reviewed and the above claim of the pat could be verified        Patient presents here today for a 6 month follow up    EKG done today    Denied  Chest pain , palpitation, dizziness or edema    Felton - chronic    No more chest pain      Nonsmoker    FHX  Mother had valve procedure, had stent at 65    Patient Active Problem List   Diagnosis    Coronary artery disease involving native coronary artery of native heart without angina pectoris    Diastolic murmur    Family history of early CAD    Non-recurrent unilateral inguinal hernia without obstruction or gangrene    LVH (left ventricular hypertrophy)    Dyslipidemia    Former smoker    Heart failure with preserved ejection fraction (HCC)    Hypertension, essential    Mitral regurgitation    Nicotine dependence, chewing tobacco, uncomplicated    Obesity (BMI 30-39.9)    Dysfunction of both eustachian tubes    Right lateral epicondylitis    Left anterior cruciate ligament tear    Tear of lateral meniscus of left knee, current    S/P cardiac cath 2015 mod LAD lesion 30 to 50%- med RX    Coronary artery disease involving native coronary artery of native heart    COVID       Past Surgical History:   Procedure Laterality Date    APPENDECTOMY      CARDIAC CATHETERIZATION      COLONOSCOPY  2017    [place]-- medical records could not send anything over 8 years old    CORONARY ANGIOPLASTY      Dr. Patiño    HERNIA REPAIR Right 2/4/2022    ROBOTIC RIGHT INGUINAL HERNIA REPAIR WITH MESH performed by John Cannon MD at [facility] OR    INGUINAL HERNIA REPAIR Left 05/13/2016    [place], [place]    KNEE SURGERY Left 02/2021    MITRAL VALVE REPAIR      ROTATOR CUFF REPAIR Right     right and  left    VENTRAL HERNIA REPAIR  05/13/2016    Sharla MOLINA       No Known Allergies     Family History   Problem Relation Age of Onset    Diabetes Mother     Mitral Valve Prolapse Mother     Heart Attack Mother     Obesity Mother     No Known Problems Father     No Known Problems Sister     No Known Problems Sister     Thyroid Disease Sister     Thyroid Disease Sister     No Known Problems Brother     No Known Problems Maternal Grandmother     No Known Problems Maternal Grandfather     No Known Problems Paternal Grandmother     Alzheimer's Disease Paternal Grandfather     Colon Cancer Neg Hx     Prostate Cancer Neg Hx         Social History     Socioeconomic History    Marital status:      Spouse name: Not on file    Number of children: Not on file    Years of education: Not on file    Highest education level: Not on file   Occupational History    Not on file   Tobacco Use    Smoking status: Never    Smokeless tobacco: Current     Types: Chew   Vaping Use    Vaping Use: Never used   Substance and Sexual Activity    Alcohol use: Yes     Comment: socially    Drug use: Never    Sexual activity: Not on file   Other Topics Concern    Not on file   Social History Narrative    Not on file     Social Determinants of Health     Financial Resource Strain: Not on file   Food Insecurity: Not on file   Transportation Needs: Not on file   Physical Activity: Not on file   Stress: Not on file   Social Connections: Not on file   Intimate Partner Violence: Not on file   Housing Stability: Not on file       Current Outpatient Medications   Medication Sig Dispense Refill    BYSTOLIC 2.5 MG tablet TAKE 1 TABLET DAILY 90 tablet 0    varenicline (CHANTIX) 0.5 MG tablet Take 1-2 tablets by mouth See Admin Instructions 0.5mg DAILY for 3 days followed by 0.5mg TWICE DAILY for 4 days followed by 1mg TWICE DAILY 57 tablet 0    varenicline (CHANTIX) 1 MG tablet Take 1 tablet by mouth 2 times daily 60 tablet 0    fluticasone (FLONASE) 50 MCG/ACT nasal spray USE 1 SPRAY IN EACH NOSTRIL DAILY 3 each 3    lisinopril (PRINIVIL;ZESTRIL) 20 MG tablet TAKE 1 TABLET DAILY 90 tablet 3    atorvastatin (LIPITOR) 20 MG tablet TAKE 1 TABLET DAILY 90 tablet 3    amLODIPine (NORVASC) 10 MG tablet Take 1 tablet by mouth in the morning. 90 tablet 3    Multiple Vitamins-Minerals (THERAPEUTIC MULTIVITAMIN-MINERALS) tablet Take 1 tablet by mouth daily      albuterol sulfate HFA (VENTOLIN HFA) 108 (90 Base) MCG/ACT inhaler Inhale 2 puffs into the lungs 4 times daily as needed for Wheezing 1 Inhaler 0    aspirin EC 81 MG EC tablet Take 1 tablet by mouth daily 90 tablet 1     No current facility-administered medications for this visit. Review of Systems -     General ROS: negative  Psychological ROS: negative  Hematological and Lymphatic ROS: No history of blood clots or bleeding disorder. Respiratory ROS: no cough,  or wheezing, the rest see HPI  Cardiovascular ROS: See HPI  Gastrointestinal ROS: negative  Genito-Urinary ROS: no dysuria, trouble voiding, or hematuria  Musculoskeletal ROS: negative  Neurological ROS: no TIA or stroke symptoms  Dermatological ROS: negative      Blood pressure 134/79, pulse 56, height 5' 10\" (1.778 m), weight 213 lb 12.8 oz (97 kg).         Physical Examination:    General appearance - alert, well appearing, and in no distress  HEENT- Pink conjunctiva  , Non-icteri sclera,PERRLA  Mental status - alert, oriented to person, place, and time  Neck - supple, no significant adenopathy, no JVD, or carotid bruits  Chest - clear to auscultation, no wheezes, rales or rhonchi, symmetric air entry  Heart - normal rate, regular rhythm, normal S1, S2, no murmurs, rubs, clicks or gallops  Abdomen - soft, nontender, nondistended, no masses or organomegaly  DAVI- no CVA or flank tenderness, no suprapubic tenderness  Neurological - alert, oriented, normal speech, no focal findings or movement disorder noted  Musculoskeletal/limbs - no joint tenderness, deformity or swelling   - peripheral pulses normal, no pedal edema, no clubbing or cyanosis  Skin - normal coloration and turgor, no rashes, no suspicious skin lesions noted  Psych- appropriate mood and affect    Lab  No results for input(s): CKTOTAL, CKMB, CKMBINDEX, TROPONINI in the last 72 hours.   CBC:   Lab Results   Component Value Date/Time    WBC 6.6 01/19/2022 12:35 PM    RBC 4.82 01/19/2022 12:35 PM    HGB 14.9 01/19/2022 12:35 PM    HCT 45.4 01/19/2022 12:35 PM    MCV 94.2 01/19/2022 12:35 PM    MCH 30.9 01/19/2022 12:35 PM    MCHC 32.8 01/19/2022 12:35 PM     01/19/2022 12:35 PM    MPV 8.3 01/19/2022 12:35 PM     BMP:    Lab Results   Component Value Date/Time     01/19/2022 12:35 PM    K 4.2 02/04/2022 11:27 AM    K 3.9 12/05/2021 09:04 PM     01/19/2022 12:35 PM    CO2 25 01/19/2022 12:35 PM    BUN 16 01/19/2022 12:35 PM    LABALBU 4.0 01/19/2022 12:35 PM    CREATININE 0.8 01/19/2022 12:35 PM    CALCIUM 9.7 01/19/2022 12:35 PM    LABGLOM >90 01/19/2022 12:35 PM    GLUCOSE 108 01/19/2022 12:35 PM     Hepatic Function Panel:    Lab Results   Component Value Date/Time    ALKPHOS 74 01/19/2022 12:35 PM    ALT 22 01/19/2022 12:35 PM    AST 22 01/19/2022 12:35 PM    PROT 7.1 01/19/2022 12:35 PM    BILITOT 0.7 01/19/2022 12:35 PM    BILIDIR <0.2 02/09/2021 09:35 AM    LABALBU 4.0 01/19/2022 12:35 PM     Magnesium:    Lab Results   Component Value Date/Time    MG 2.1 10/29/2020 12:50 AM     Warfarin PT/INR:  No components found for: PTPATWAR, PTINRWAR  HgBA1c:  No results found for: LABA1C  FLP:    Lab Results   Component Value Date/Time    TRIG 87 02/09/2021 09:35 AM    HDL 55 02/09/2021 09:35 AM    LDLCALC 76 02/09/2021 09:35 AM     TSH:  No results found for: TSH      Reviewed the record from outside [address] cath Dec 30, 2015 at [place], [address] < [place]  ]EF 65%  LAD : 30-50%  LCx/RCA: Normal  RCA could not be accessed radially- had to be accessed through femoral access Electronically signed by Da Esquivel MD at 12/30/2015 11:37 AM EST      Mid LAD lesion 50% stenosed. Final Impression:        1. Coronary artery disease as described above    2. Mild to moderate stenosis of the mid left anterior descending artery. 3.  The left circumflex Artery and the right coronary artery appear     angiographically normal.  The right coronary artery has a posterior     takeoff. 4.  Left ventricular filling pressures are normal    5. Left ventricular systolic function is normal           Plan:        1. Adding calcium channel blockers for possible spasm. 2.  Aspirin and statin therapy is imperative. Choco Samson MD, [facility]  Echo Dec 2015  CONCLUSIONS     Left ventricular wall thickness mildly increased. Left ventricular cavity size normal.  Mild global hypokinesis. LVEF   45-50%. Normal right ventricular size. Mildly reduced right ventricular global systolic function. Mild-to-moderate mitral regurgitation    ekg  8/29/19  Sinus  Bradycardia   WITHIN NORMAL LIMITS    Conclusions      Summary   Normal left ventricle size and systolic function. Ejection fraction was   estimated at 60 %. There were no regional left ventricular wall motion   abnormalities and wall thickness was within normal limits. The left atrium is Mildly dilated. Signature      ----------------------------------------------------------------   Electronically signed by Linda Trinidad MD (Interpreting   physician) on 09/05/2019 at 05:39 PM    Conclusions      Summary   Lexiscan EKG stress test is not suggestive for ischemia. The nuclear images is not suggestive for myocardial ischemia.       Signatures      ----------------------------------------------------------------   Electronically signed by Linda Trinidad MD (Interpreting   Cardiologist) on 01/28/2021 at 19:12   ----------------------------------------------------------------    Conclusions      Summary   Normal left ventricle size and systolic function. Ejection fraction was   estimated at 60 %. There were no regional left ventricular wall motion   abnormalities and wall thickness was within normal limits. The left atrium is Mildly dilated. Signature      ----------------------------------------------------------------   Electronically signed by Bárbara Serrano MD (Interpreting   physician) on 12/16/2021 at 06:52 PM   ----------------------------------------------------------------       Conclusions      Summary   Lexiscan EKG stress test is not suggestive for ischemia. The nuclear images is not suggestive for myocardial ischemia. Signatures      ----------------------------------------------------------------   Electronically signed by Bárbara Serrano MD (Interpreting   Cardiologist) on 01/28/2021 at 19:12          Peak HR:147 bpm                    HR response: Appropriate    Peak BP:176/80 mmHg                BP response: Normal Resting BP with    Predicted HR: 165 bpm              appropriate response to Stress    % of predicted HR: 89              HR/BP product:03609    Test duration:12 min               Max exercise: 14.4 METS    Reason for termination:Target HR    achieved                           Exercise effort:Excellent         Conclusions        Summary    Exercise EKG stress test is not suggestive for ischemia. The nuclear images is not suggestive for myocardial ischemia.         Signatures        ----------------------------------------------------------------    Electronically signed by Bárbara Serrano MD (Interpreting    Cardiologist) on 12/16/2021 at 19:23    ----------------------------------------------------------------         ekg 1/22/21  Sinus  Bradycardia   WITHIN NORMAL LIMITS    ekg 12/7/21  Sinus bradycardia Otherwise normal ECG When compared with ECG of 29-OCT-2020 00:30, No significant change was found Confirmed by ELENITA Alejo MD (3    Troponinn neg x2    Ekg 1/26/23  Sinus  Bradycardia -otherwise WNL      Assessment    ? ? ? Hx valve procedure mitral valve ( could not be verified from the record)     Diagnosis Orders   1. Coronary artery disease involving native coronary artery of native heart without angina pectoris  EKG 12 Lead      2. Hypertension, essential        3. Dyslipidemia        4. LVH (left ventricular hypertrophy)        5. S/P cardiac cath 2015 mod LAD lesion 30 to 50%- med RX                Plan   The  most  current meds and labs reviewed    Continue the current treatment and with constant vigilance to changes in symptoms and also any potential side effects. Return for care or seek medical attention immediately if symptoms got worse and/or develop new symptoms. Hypertension, on medical treatment. Seems to be under good control. Patient is compliant with medical treatment. Hyperlipidemia: on statins, followed periodically. Patient need periodic lipid and liver profile. Coronary artery disease, seems to be stable. Denies angina or change in breathing pattern  Coronary artery disease, mod non-obst  Never had any intervention from the record from cardiac stand point  But moderate nonobstructive CAD noted  Cont asa and statins  Asa 81 mg po qd  Metoprolol stopped In [place] due to ED per pat  Now on Bystolic    Echo- WNL   report from  [facility] and [place] reviewed   NO stent noted   No Mitral valve surgery Noted from records  No Hx of cardiac or coronary intervention  Echo - WNL  Ex nux stress - negative  Exercise tolerance good METS 14    Overall pat is Stable and doing well    D/w the pat the plan of care details    Lipid panel and liver function test before next appointment      patient is advised to exercise 30 min s a day three times a week and about weight loss ,balance diet and     More fruits and vegetables . Stable and doing well    Discussed use, benefit, and side effects of prescribed medications. All patient questions answered. Pt voiced understanding.  Instructed to continue current medications, diet and exercise. Continue risk factor modification and medical management. Patient agreed with treatment plan. Follow up as directed.       RTC in 6 months    Carol Callaway [facility]

## 2023-03-13 ENCOUNTER — TELEPHONE (OUTPATIENT)
Dept: CARDIOLOGY CLINIC | Age: 58
End: 2023-03-13

## 2023-03-13 NOTE — TELEPHONE ENCOUNTER
Call to [facility] - spoke with the PA department. PA started under CM. Key- [id number]. Spoke with pt. Your information has been sent to [facility].

## 2023-03-13 NOTE — TELEPHONE ENCOUNTER
Patient is calling in regarding his bystolic medication. He said it needs a prior authorization because it is not covered by his insurance. He is just about of medication, are there samples he can have until this is done? Please call him to advise.

## 2023-03-16 NOTE — TELEPHONE ENCOUNTER
N/Aon March 14  This medication or product was previously approved on [id number] from 03/13/2023 to 03/13/2024. You will be able to fill a prescription for this medication at your pharmacy. If your pharmacy has questions regarding the processing of your prescription, please have them call the [facility] pharmacy help desk at [phone]. Spoke with Mayda Montero at [place]. Mayda Montero states medication was sent out yesterday. Informed pt. Pt voiced understanding.

## 2023-05-08 RX ORDER — AMLODIPINE BESYLATE 10 MG/1
10 TABLET ORAL DAILY
Qty: 90 TABLET | Refills: 3 | Status: SHIPPED | OUTPATIENT
Start: 2023-05-08

## 2023-05-08 RX ORDER — ATORVASTATIN CALCIUM 20 MG/1
TABLET, FILM COATED ORAL
Qty: 90 TABLET | Refills: 3 | Status: SHIPPED | OUTPATIENT
Start: 2023-05-08

## 2023-05-08 RX ORDER — LISINOPRIL 20 MG/1
TABLET ORAL
Qty: 90 TABLET | Refills: 3 | Status: SHIPPED | OUTPATIENT
Start: 2023-05-08

## 2023-05-22 RX ORDER — NEBIVOLOL HYDROCHLORIDE 2.5 MG/1
TABLET ORAL
Qty: 90 TABLET | Refills: 0 | Status: SHIPPED | OUTPATIENT
Start: 2023-05-22

## 2023-07-27 ENCOUNTER — OFFICE VISIT (OUTPATIENT)
Dept: CARDIOLOGY CLINIC | Age: 58
End: 2023-07-27
Payer: COMMERCIAL

## 2023-07-27 VITALS
SYSTOLIC BLOOD PRESSURE: 131 MMHG | WEIGHT: 221 LBS | HEIGHT: 69 IN | BODY MASS INDEX: 32.73 KG/M2 | HEART RATE: 59 BPM | DIASTOLIC BLOOD PRESSURE: 89 MMHG

## 2023-07-27 DIAGNOSIS — I50.32 CHRONIC HEART FAILURE WITH PRESERVED EJECTION FRACTION (HCC): Chronic | ICD-10-CM

## 2023-07-27 DIAGNOSIS — I10 HYPERTENSION, ESSENTIAL: Chronic | ICD-10-CM

## 2023-07-27 DIAGNOSIS — Z82.49 FAMILY HISTORY OF EARLY CAD: Chronic | ICD-10-CM

## 2023-07-27 DIAGNOSIS — Z98.890 S/P CARDIAC CATH: ICD-10-CM

## 2023-07-27 DIAGNOSIS — E78.5 DYSLIPIDEMIA: Chronic | ICD-10-CM

## 2023-07-27 DIAGNOSIS — I51.7 LVH (LEFT VENTRICULAR HYPERTROPHY): ICD-10-CM

## 2023-07-27 DIAGNOSIS — I25.10 CORONARY ARTERY DISEASE INVOLVING NATIVE CORONARY ARTERY OF NATIVE HEART WITHOUT ANGINA PECTORIS: Primary | ICD-10-CM

## 2023-07-27 PROCEDURE — 3075F SYST BP GE 130 - 139MM HG: CPT | Performed by: INTERNAL MEDICINE

## 2023-07-27 PROCEDURE — 99214 OFFICE O/P EST MOD 30 MIN: CPT | Performed by: INTERNAL MEDICINE

## 2023-07-27 PROCEDURE — 3079F DIAST BP 80-89 MM HG: CPT | Performed by: INTERNAL MEDICINE

## 2023-07-27 NOTE — PROGRESS NOTES
Chronic heart failure with preserved ejection fraction ([address])        3. Hypertension, essential        4. Dyslipidemia        5. LVH (left ventricular hypertrophy)        6. S/P cardiac cath 2015 mod LAD lesion 30 to 50%- med RX        7. Family history of early CAD                  Plan   The current meds and labs reviewed    Continue the current treatment and with constant vigilance to changes in symptoms and also any potential side effects. Return for care or seek medical attention immediately if symptoms got worse and/or develop new symptoms. Hypertension, on medical treatment. Seems to be under good control. Patient is compliant with medical treatment. Hyperlipidemia: on statins, followed periodically. Patient need periodic lipid and liver profile. Coronary artery disease, seems to be stable. Denies angina or change in breathing pattern  Coronary artery disease, mod non-obst  Never had any intervention from the record from cardiac stand point  But moderate nonobstructive CAD noted  Cont asa and statins  Asa 81 mg po qd  Metoprolol stopped In [place] due to ED per pat  Now on Bystolic    Echo- WNL   report from  [place] and [place] reviewed   NO stent noted   No Mitral valve surgery Noted from records  No Hx of cardiac or coronary intervention  Echo - WNL  Ex nux stress - negative  Exercise tolerance good METS 14    Overall pat is Stable and doing well    D/w the pat the plan of care details    Lipid panel and liver function test before next appointment    patient is advised to exercise 30 min s a day three times a week and about weight loss ,balance diet and     More fruits and vegetables . The pat Stable and doing well    Advised to lose wt    Labs prior to next visit    Discussed use, benefit, and side effects of prescribed medications. All patient questions answered. Pt voiced understanding. Instructed to continue current medications, diet and exercise.  Continue risk factor modification and medical

## 2023-08-06 DIAGNOSIS — J30.89 NON-SEASONAL ALLERGIC RHINITIS, UNSPECIFIED TRIGGER: ICD-10-CM

## 2023-08-07 RX ORDER — FLUTICASONE PROPIONATE 50 MCG
SPRAY, SUSPENSION (ML) NASAL
Qty: 3 EACH | Refills: 0 | Status: SHIPPED | OUTPATIENT
Start: 2023-08-07

## 2023-08-07 NOTE — TELEPHONE ENCOUNTER
Recent Visits  No visits were found meeting these conditions. Showing recent visits within past 540 days with a meds authorizing provider and meeting all other requirements  Future Appointments  No visits were found meeting these conditions.   Showing future appointments within next 150 days with a meds authorizing provider and meeting all other requirements

## 2023-10-03 RX ORDER — NEBIVOLOL HYDROCHLORIDE 2.5 MG/1
TABLET ORAL
Qty: 90 TABLET | Refills: 3 | Status: SHIPPED | OUTPATIENT
Start: 2023-10-03

## 2024-01-25 ENCOUNTER — HOSPITAL ENCOUNTER (OUTPATIENT)
Age: 59
Discharge: HOME | End: 2024-01-25
Payer: COMMERCIAL

## 2024-01-25 ENCOUNTER — OFFICE VISIT (OUTPATIENT)
Dept: CARDIOLOGY CLINIC | Age: 59
End: 2024-01-25
Payer: COMMERCIAL

## 2024-01-25 VITALS
HEART RATE: 61 BPM | HEIGHT: 70 IN | DIASTOLIC BLOOD PRESSURE: 72 MMHG | SYSTOLIC BLOOD PRESSURE: 113 MMHG | WEIGHT: 215.2 LBS | BODY MASS INDEX: 30.81 KG/M2

## 2024-01-25 DIAGNOSIS — I10 HYPERTENSION, ESSENTIAL: Chronic | ICD-10-CM

## 2024-01-25 DIAGNOSIS — I50.32 CHRONIC HEART FAILURE WITH PRESERVED EJECTION FRACTION (HCC): Chronic | ICD-10-CM

## 2024-01-25 DIAGNOSIS — E78.5 DYSLIPIDEMIA: Chronic | ICD-10-CM

## 2024-01-25 DIAGNOSIS — I51.7 LVH (LEFT VENTRICULAR HYPERTROPHY): ICD-10-CM

## 2024-01-25 DIAGNOSIS — R06.09 DOE (DYSPNEA ON EXERTION): ICD-10-CM

## 2024-01-25 DIAGNOSIS — Z87.898 HISTORY OF CHEST PAIN: ICD-10-CM

## 2024-01-25 DIAGNOSIS — Z98.890 S/P CARDIAC CATH: ICD-10-CM

## 2024-01-25 DIAGNOSIS — I25.10 CORONARY ARTERY DISEASE INVOLVING NATIVE CORONARY ARTERY OF NATIVE HEART, UNSPECIFIED WHETHER ANGINA PRESENT: ICD-10-CM

## 2024-01-25 DIAGNOSIS — I25.10 CORONARY ARTERY DISEASE INVOLVING NATIVE CORONARY ARTERY OF NATIVE HEART, UNSPECIFIED WHETHER ANGINA PRESENT: Primary | ICD-10-CM

## 2024-01-25 LAB
ALBUMIN SERPL BCG-MCNC: 4.7 G/DL (ref 3.5–5.1)
ALP SERPL-CCNC: 69 U/L (ref 38–126)
ALT SERPL W/O P-5'-P-CCNC: 28 U/L (ref 11–66)
ANION GAP SERPL CALC-SCNC: 13 MEQ/L (ref 8–16)
AST SERPL-CCNC: 28 U/L (ref 5–40)
BILIRUB CONJ SERPL-MCNC: < 0.2 MG/DL (ref 0–0.3)
BILIRUB SERPL-MCNC: 0.7 MG/DL (ref 0.3–1.2)
BUN SERPL-MCNC: 22 MG/DL (ref 7–22)
CALCIUM SERPL-MCNC: 10.1 MG/DL (ref 8.5–10.5)
CHLORIDE SERPL-SCNC: 101 MEQ/L (ref 98–111)
CHOLEST SERPL-MCNC: 150 MG/DL (ref 100–199)
CO2 SERPL-SCNC: 23 MEQ/L (ref 23–33)
CREAT SERPL-MCNC: 1 MG/DL (ref 0.4–1.2)
GFR SERPL CREATININE-BSD FRML MDRD: > 60 ML/MIN/1.73M2
GLUCOSE SERPL-MCNC: 89 MG/DL (ref 70–108)
HDLC SERPL-MCNC: 59 MG/DL
LDLC SERPL CALC-MCNC: 80 MG/DL
MAGNESIUM SERPL-MCNC: 2.3 MG/DL (ref 1.6–2.4)
POTASSIUM SERPL-SCNC: 4.7 MEQ/L (ref 3.5–5.2)
PROT SERPL-MCNC: 7.3 G/DL (ref 6.1–8)
SODIUM SERPL-SCNC: 137 MEQ/L (ref 135–145)
TRIGL SERPL-MCNC: 54 MG/DL (ref 0–199)

## 2024-01-25 PROCEDURE — 99214 OFFICE O/P EST MOD 30 MIN: CPT | Performed by: INTERNAL MEDICINE

## 2024-01-25 PROCEDURE — 82248 BILIRUBIN DIRECT: CPT

## 2024-01-25 PROCEDURE — 93000 ELECTROCARDIOGRAM COMPLETE: CPT | Performed by: INTERNAL MEDICINE

## 2024-01-25 PROCEDURE — 3074F SYST BP LT 130 MM HG: CPT | Performed by: INTERNAL MEDICINE

## 2024-01-25 PROCEDURE — 36415 COLL VENOUS BLD VENIPUNCTURE: CPT

## 2024-01-25 PROCEDURE — 80061 LIPID PANEL: CPT

## 2024-01-25 PROCEDURE — 80053 COMPREHEN METABOLIC PANEL: CPT

## 2024-01-25 PROCEDURE — 3078F DIAST BP <80 MM HG: CPT | Performed by: INTERNAL MEDICINE

## 2024-01-25 PROCEDURE — 83735 ASSAY OF MAGNESIUM: CPT

## 2024-01-25 NOTE — PROGRESS NOTES
stand point  But moderate nonobstructive CAD noted 2015  Cont asa and statins  Asa 81 mg po qd  Metoprolol stopped In [place] due to ED per pat  Now on Bystolic      Hx of chest pain dec 2023 after he stopped his cardiac meds for 4 days - had intermittent pain for 2 days and resolved once started taking his meds  Ex nuc stress  echo       report from  [place] and [place] reviewed   NO stent noted   No Mitral valve surgery Noted from records  No Hx of cardiac or coronary intervention      Lipid panel and liver function test before next appointment    patient is advised to exercise 30 min s a day three times a week and about weight loss ,balance diet and     More fruits and vegetables .    Advised to lose wt    Labs  asap    D/w the pat  the plan of care    Discussed use, benefit, and side effects of prescribed medications. All patient questions answered. Pt voiced understanding. Instructed to continue current medications, diet and exercise. Continue risk factor modification and medical management. Patient agreed with treatment plan. Follow up as directed.      The patient is advised to attempt to improve diet and exercise patterns to aid in medical management of this problem.  Advised more plant based nutrition/meditarrean diet   Advised patient to call office or seek immediate medical attention if there is any new onset of  any chest pain, sob, palpitations, lightheadedness, dizziness, orthopnea, PND or pedal edema.   All medication side effects were discussed in details.        RTC in 6 months    Rosemary Mejia MD,MD,FACC

## 2024-02-02 ENCOUNTER — HOSPITAL ENCOUNTER (OUTPATIENT)
Age: 59
End: 2024-02-02
Attending: INTERNAL MEDICINE
Payer: COMMERCIAL

## 2024-02-02 ENCOUNTER — HOSPITAL ENCOUNTER (OUTPATIENT)
Dept: NUCLEAR MEDICINE | Age: 59
Discharge: HOME | End: 2024-02-02
Attending: INTERNAL MEDICINE
Payer: COMMERCIAL

## 2024-02-02 VITALS
BODY MASS INDEX: 30.78 KG/M2 | WEIGHT: 215 LBS | SYSTOLIC BLOOD PRESSURE: 113 MMHG | DIASTOLIC BLOOD PRESSURE: 72 MMHG | HEIGHT: 70 IN

## 2024-02-02 DIAGNOSIS — I50.32 CHRONIC HEART FAILURE WITH PRESERVED EJECTION FRACTION (HCC): Chronic | ICD-10-CM

## 2024-02-02 DIAGNOSIS — Z87.898 HISTORY OF CHEST PAIN: ICD-10-CM

## 2024-02-02 DIAGNOSIS — E78.5 DYSLIPIDEMIA: Chronic | ICD-10-CM

## 2024-02-02 DIAGNOSIS — I25.10 CORONARY ARTERY DISEASE INVOLVING NATIVE CORONARY ARTERY OF NATIVE HEART, UNSPECIFIED WHETHER ANGINA PRESENT: ICD-10-CM

## 2024-02-02 DIAGNOSIS — Z98.890 S/P CARDIAC CATH: ICD-10-CM

## 2024-02-02 DIAGNOSIS — I10 HYPERTENSION, ESSENTIAL: Chronic | ICD-10-CM

## 2024-02-02 DIAGNOSIS — I51.7 LVH (LEFT VENTRICULAR HYPERTROPHY): ICD-10-CM

## 2024-02-02 DIAGNOSIS — R06.09 DOE (DYSPNEA ON EXERTION): ICD-10-CM

## 2024-02-02 LAB
ECHO AO ASC DIAM: 3.7 CM
ECHO AO ASCENDING AORTA INDEX: 1.72 CM/M2
ECHO AV CUSP MM: 2.3 CM
ECHO AV PEAK GRADIENT: 5 MMHG
ECHO AV PEAK VELOCITY: 1.1 M/S
ECHO AV VELOCITY RATIO: 0.73
ECHO BSA: 2.19 M2
ECHO BSA: 2.19 M2
ECHO LA AREA 2C: 14.1 CM2
ECHO LA AREA 4C: 13.2 CM2
ECHO LA DIAMETER INDEX: 1.4 CM/M2
ECHO LA DIAMETER: 3 CM
ECHO LA MAJOR AXIS: 4.6 CM
ECHO LA MINOR AXIS: 5.2 CM
ECHO LA VOL BP: 33 ML (ref 18–58)
ECHO LA VOL MOD A2C: 31 ML (ref 18–58)
ECHO LA VOL MOD A4C: 32 ML (ref 18–58)
ECHO LA VOL/BSA BIPLANE: 15 ML/M2 (ref 16–34)
ECHO LA VOLUME INDEX MOD A2C: 14 ML/M2 (ref 16–34)
ECHO LA VOLUME INDEX MOD A4C: 15 ML/M2 (ref 16–34)
ECHO LV E' LATERAL VELOCITY: 11 CM/S
ECHO LV E' SEPTAL VELOCITY: 8 CM/S
ECHO LV FRACTIONAL SHORTENING: 33 % (ref 28–44)
ECHO LV INTERNAL DIMENSION DIASTOLE INDEX: 2.37 CM/M2
ECHO LV INTERNAL DIMENSION DIASTOLIC: 5.1 CM (ref 4.2–5.9)
ECHO LV INTERNAL DIMENSION SYSTOLIC INDEX: 1.58 CM/M2
ECHO LV INTERNAL DIMENSION SYSTOLIC: 3.4 CM
ECHO LV ISOVOLUMETRIC RELAXATION TIME (IVRT): 67 MS
ECHO LV IVSD: 0.9 CM (ref 0.6–1)
ECHO LV MASS 2D: 151.8 G (ref 88–224)
ECHO LV MASS INDEX 2D: 70.6 G/M2 (ref 49–115)
ECHO LV POSTERIOR WALL DIASTOLIC: 0.8 CM (ref 0.6–1)
ECHO LV RELATIVE WALL THICKNESS RATIO: 0.31
ECHO LVOT PEAK GRADIENT: 3 MMHG
ECHO LVOT PEAK VELOCITY: 0.8 M/S
ECHO MV A VELOCITY: 0.48 M/S
ECHO MV E DECELERATION TIME (DT): 134 MS
ECHO MV E VELOCITY: 0.7 M/S
ECHO MV E/A RATIO: 1.46
ECHO MV E/E' LATERAL: 6.36
ECHO MV E/E' RATIO (AVERAGED): 7.56
ECHO PV MAX VELOCITY: 0.6 M/S
ECHO PV PEAK GRADIENT: 1 MMHG
ECHO RV INTERNAL DIMENSION: 2.8 CM
ECHO RV TAPSE: 1.9 CM (ref 1.7–?)
ECHO TV E WAVE: 0.6 M/S
ECHO TV REGURGITANT MAX VELOCITY: 2.45 M/S
ECHO TV REGURGITANT PEAK GRADIENT: 24 MMHG
NUC STRESS EJECTION FRACTION: 58 %
STRESS ANGINA INDEX: 0
STRESS BASELINE DIAS BP: 77 MMHG
STRESS BASELINE HR: 56 BPM
STRESS BASELINE ST DEPRESSION: 0 MM
STRESS BASELINE SYS BP: 119 MMHG
STRESS ESTIMATED WORKLOAD: 13.7 METS
STRESS EXERCISE DUR MIN: 9 MIN
STRESS EXERCISE DUR SEC: 15 SEC
STRESS PEAK DIAS BP: 80 MMHG
STRESS PEAK SYS BP: 148 MMHG
STRESS PERCENT HR ACHIEVED: 83 %
STRESS POST PEAK HR: 136 BPM
STRESS RATE PRESSURE PRODUCT: NORMAL BPM*MMHG
STRESS SR DUKE TREADMILL SCORE: 9
STRESS ST DEPRESSION: 0 MM
STRESS STAGE 1 BP: NORMAL MMHG
STRESS STAGE 1 DURATION: 3 MIN:SEC
STRESS STAGE 1 HR: 79 BPM
STRESS STAGE 2 BP: NORMAL MMHG
STRESS STAGE 2 DURATION: 2 MIN:SEC
STRESS STAGE 2 HR: 99 BPM
STRESS STAGE 3 DURATION: 2 MIN:SEC
STRESS STAGE 3 HR: 122 BPM
STRESS STAGE 4 DURATION: 2 MIN:SEC
STRESS STAGE 4 HR: 136 BPM
STRESS STAGE RECOVERY 1 DURATION: 0 MIN:SEC
STRESS STAGE RECOVERY 1 HR: 136 BPM
STRESS STAGE RECOVERY 2 BP: NORMAL MMHG
STRESS STAGE RECOVERY 2 DURATION: 2 MIN:SEC
STRESS STAGE RECOVERY 2 HR: 91 BPM
STRESS STAGE RECOVERY 3 BP: NORMAL MMHG
STRESS STAGE RECOVERY 3 DURATION: 3 MIN:SEC
STRESS STAGE RECOVERY 3 HR: 77 BPM
STRESS TARGET HR: 163 BPM
TID: 0.93

## 2024-02-02 PROCEDURE — 93306 TTE W/DOPPLER COMPLETE: CPT

## 2024-02-02 PROCEDURE — A9500 TC99M SESTAMIBI: HCPCS | Performed by: INTERNAL MEDICINE

## 2024-02-02 PROCEDURE — 93306 TTE W/DOPPLER COMPLETE: CPT | Performed by: INTERNAL MEDICINE

## 2024-02-02 PROCEDURE — 78452 HT MUSCLE IMAGE SPECT MULT: CPT

## 2024-02-02 PROCEDURE — 93017 CV STRESS TEST TRACING ONLY: CPT

## 2024-02-02 PROCEDURE — 3430000000 HC RX DIAGNOSTIC RADIOPHARMACEUTICAL: Performed by: INTERNAL MEDICINE

## 2024-02-02 RX ORDER — TETRAKIS(2-METHOXYISOBUTYLISOCYANIDE)COPPER(I) TETRAFLUOROBORATE 1 MG/ML
34.7 INJECTION, POWDER, LYOPHILIZED, FOR SOLUTION INTRAVENOUS
Status: COMPLETED | OUTPATIENT
Start: 2024-02-02 | End: 2024-02-02

## 2024-02-02 RX ORDER — TETRAKIS(2-METHOXYISOBUTYLISOCYANIDE)COPPER(I) TETRAFLUOROBORATE 1 MG/ML
10.1 INJECTION, POWDER, LYOPHILIZED, FOR SOLUTION INTRAVENOUS
Status: COMPLETED | OUTPATIENT
Start: 2024-02-02 | End: 2024-02-02

## 2024-02-02 RX ADMIN — Medication 34.7 MILLICURIE: at 11:07

## 2024-02-02 RX ADMIN — Medication 10.1 MILLICURIE: at 09:25

## 2024-02-12 ENCOUNTER — OFFICE VISIT (OUTPATIENT)
Dept: CARDIOLOGY CLINIC | Age: 59
End: 2024-02-12
Payer: COMMERCIAL

## 2024-02-12 VITALS
BODY MASS INDEX: 31.38 KG/M2 | WEIGHT: 219.2 LBS | DIASTOLIC BLOOD PRESSURE: 88 MMHG | HEART RATE: 54 BPM | HEIGHT: 70 IN | SYSTOLIC BLOOD PRESSURE: 124 MMHG

## 2024-02-12 DIAGNOSIS — R94.39 ABNORMAL STRESS TEST: ICD-10-CM

## 2024-02-12 DIAGNOSIS — I10 HYPERTENSION, ESSENTIAL: ICD-10-CM

## 2024-02-12 DIAGNOSIS — R07.9 CHEST PAIN AT REST: Primary | ICD-10-CM

## 2024-02-12 DIAGNOSIS — I25.10 CORONARY ARTERY DISEASE INVOLVING NATIVE CORONARY ARTERY OF NATIVE HEART, UNSPECIFIED WHETHER ANGINA PRESENT: ICD-10-CM

## 2024-02-12 DIAGNOSIS — E78.5 DYSLIPIDEMIA: Chronic | ICD-10-CM

## 2024-02-12 DIAGNOSIS — Z87.898 HISTORY OF CHEST PAIN: ICD-10-CM

## 2024-02-12 DIAGNOSIS — R06.09 DOE (DYSPNEA ON EXERTION): ICD-10-CM

## 2024-02-12 DIAGNOSIS — Z98.890 S/P CARDIAC CATH: ICD-10-CM

## 2024-02-12 PROCEDURE — 93000 ELECTROCARDIOGRAM COMPLETE: CPT | Performed by: INTERNAL MEDICINE

## 2024-02-12 PROCEDURE — 99214 OFFICE O/P EST MOD 30 MIN: CPT | Performed by: INTERNAL MEDICINE

## 2024-02-12 PROCEDURE — 3079F DIAST BP 80-89 MM HG: CPT | Performed by: INTERNAL MEDICINE

## 2024-02-12 PROCEDURE — 3074F SYST BP LT 130 MM HG: CPT | Performed by: INTERNAL MEDICINE

## 2024-02-12 NOTE — PROGRESS NOTES
originally  patient moved from [place]  ???Hx of Mitral valve repair/ clip for MR 5 yrs back- transcathater- per pat ( not confirmed)  ???Hx of coronary stent 2 5 yrs back per pat ( Not confirmed from record)  Record from outside  Reviewed         Pt here due to abnormal stress test    EKG done today    Denied   palpitation, dizziness or edema    Felton - chronic    Hx of chest pain dec 2023 after he stopped his cardiac meds for 4 days -  Continue to  have chest pressure  1/ week, at rest and exertion  Last 10 min, 1x/ week, nonradiating    Nonsmoker    FHX  Mother had valve procedure, had stent at 65    Patient Active Problem List   Diagnosis    Diastolic murmur    Family history of early CAD    Non-recurrent unilateral inguinal hernia without obstruction or gangrene    LVH (left ventricular hypertrophy)    Dyslipidemia    Former smoker    Heart failure with preserved ejection fraction (HCC)    Hypertension, essential    Mitral regurgitation    Nicotine dependence, chewing tobacco, uncomplicated    Obesity (BMI 30-39.9)    Dysfunction of both eustachian tubes    Right lateral epicondylitis    Left anterior cruciate ligament tear    Tear of lateral meniscus of left knee, current    S/P cardiac cath 2015 mod LAD lesion 30 to 50%- med RX    Coronary artery disease involving native coronary artery of native heart    COVID    History of chest pain    FELTON (dyspnea on exertion)    Chest pain at rest       Past Surgical History:   Procedure Laterality Date    APPENDECTOMY      CARDIAC CATHETERIZATION      COLONOSCOPY  2017    [place]-- medical records could not send anything over 8 years old    CORONARY ANGIOPLASTY      Dr. Patiño    HERNIA REPAIR Right 2/4/2022    ROBOTIC RIGHT INGUINAL HERNIA REPAIR WITH MESH performed by John Cannon MD at [facility] OR    INGUINAL HERNIA REPAIR Left 05/13/2016    [place], [place]    KNEE SURGERY Left 02/2021    MITRAL VALVE REPAIR      ROTATOR CUFF REPAIR Right     right and left

## 2024-02-13 ENCOUNTER — TELEPHONE (OUTPATIENT)
Dept: CARDIOLOGY CLINIC | Age: 59
End: 2024-02-13

## 2024-02-13 PROBLEM — R94.39 ABNORMAL STRESS TEST: Status: ACTIVE | Noted: 2024-02-12

## 2024-03-05 NOTE — PROGRESS NOTES
NPO after midnight  Bring drivers license and insurance information  Wear comfortable clean clothes  Shower morning of and night before with liquid antibacterial soap  Remove jewelry   May have to stay overnight if have PTCA/stent  Bring medications in original bottles  Made aware of visitors limit to 2 at a time  Follow all instructions given by your physician  Please notify doctor office if you need to cancel or reschedule your procedure   needed at discharge   Left message.

## 2024-03-07 ENCOUNTER — PREP FOR PROCEDURE (OUTPATIENT)
Dept: CARDIOLOGY | Age: 59
End: 2024-03-07

## 2024-03-07 RX ORDER — SODIUM CHLORIDE 0.9 % (FLUSH) 0.9 %
5-40 SYRINGE (ML) INJECTION PRN
Status: CANCELLED | OUTPATIENT
Start: 2024-03-07

## 2024-03-07 RX ORDER — SODIUM CHLORIDE 0.9 % (FLUSH) 0.9 %
5-40 SYRINGE (ML) INJECTION EVERY 12 HOURS SCHEDULED
Status: CANCELLED | OUTPATIENT
Start: 2024-03-07

## 2024-03-07 RX ORDER — SODIUM CHLORIDE 9 MG/ML
INJECTION, SOLUTION INTRAVENOUS CONTINUOUS
Status: CANCELLED | OUTPATIENT
Start: 2024-03-07

## 2024-03-07 RX ORDER — NITROGLYCERIN 0.4 MG/1
0.4 TABLET SUBLINGUAL EVERY 5 MIN PRN
Status: CANCELLED | OUTPATIENT
Start: 2024-03-07

## 2024-03-07 RX ORDER — ASPIRIN 325 MG
325 TABLET ORAL ONCE
Status: CANCELLED | OUTPATIENT
Start: 2024-03-07 | End: 2024-03-07

## 2024-03-07 RX ORDER — SODIUM CHLORIDE 9 MG/ML
INJECTION, SOLUTION INTRAVENOUS PRN
Status: CANCELLED | OUTPATIENT
Start: 2024-03-07

## 2024-03-08 ENCOUNTER — HOSPITAL ENCOUNTER (OUTPATIENT)
Age: 59
Setting detail: OUTPATIENT SURGERY
Discharge: HOME | End: 2024-03-08
Attending: INTERNAL MEDICINE | Admitting: INTERNAL MEDICINE
Payer: COMMERCIAL

## 2024-03-08 VITALS
WEIGHT: 207.23 LBS | HEIGHT: 70 IN | TEMPERATURE: 98.5 F | OXYGEN SATURATION: 96 % | SYSTOLIC BLOOD PRESSURE: 130 MMHG | BODY MASS INDEX: 29.67 KG/M2 | DIASTOLIC BLOOD PRESSURE: 86 MMHG | RESPIRATION RATE: 16 BRPM | HEART RATE: 49 BPM

## 2024-03-08 DIAGNOSIS — R94.39 ABNORMAL STRESS TEST: ICD-10-CM

## 2024-03-08 PROBLEM — Z98.890 S/P CARDIAC CATHETERIZATION: Status: ACTIVE | Noted: 2024-03-08

## 2024-03-08 LAB
ABO: NORMAL
ANION GAP SERPL CALC-SCNC: 14 MEQ/L (ref 8–16)
ANTIBODY SCREEN: NORMAL
APTT PPP: 39 SECONDS (ref 22–38)
BUN SERPL-MCNC: 20 MG/DL (ref 7–22)
CALCIUM SERPL-MCNC: 9.8 MG/DL (ref 8.5–10.5)
CHLORIDE SERPL-SCNC: 100 MEQ/L (ref 98–111)
CHOLEST SERPL-MCNC: 155 MG/DL (ref 100–199)
CO2 SERPL-SCNC: 23 MEQ/L (ref 23–33)
CREAT SERPL-MCNC: 1.1 MG/DL (ref 0.4–1.2)
DEPRECATED RDW RBC AUTO: 40.6 FL (ref 35–45)
ECHO BSA: 2.15 M2
EKG ATRIAL RATE: 54 BPM
EKG P AXIS: 25 DEGREES
EKG P-R INTERVAL: 184 MS
EKG Q-T INTERVAL: 428 MS
EKG QRS DURATION: 76 MS
EKG QTC CALCULATION (BAZETT): 405 MS
EKG R AXIS: -5 DEGREES
EKG T AXIS: 18 DEGREES
EKG VENTRICULAR RATE: 54 BPM
ERYTHROCYTE [DISTWIDTH] IN BLOOD BY AUTOMATED COUNT: 12 % (ref 11.5–14.5)
GFR SERPL CREATININE-BSD FRML MDRD: > 60 ML/MIN/1.73M2
GLUCOSE SERPL-MCNC: 92 MG/DL (ref 70–108)
HCT VFR BLD AUTO: 49.3 % (ref 42–52)
HDLC SERPL-MCNC: 63 MG/DL
HGB BLD-MCNC: 16.6 GM/DL (ref 14–18)
INR PPP: 1.12 (ref 0.85–1.13)
LDLC SERPL CALC-MCNC: 79 MG/DL
MCH RBC QN AUTO: 31 PG (ref 26–33)
MCHC RBC AUTO-ENTMCNC: 33.7 GM/DL (ref 32.2–35.5)
MCV RBC AUTO: 92.1 FL (ref 80–94)
PLATELET # BLD AUTO: 216 THOU/MM3 (ref 130–400)
PMV BLD AUTO: 9.7 FL (ref 9.4–12.4)
POTASSIUM SERPL-SCNC: 4.2 MEQ/L (ref 3.5–5.2)
RBC # BLD AUTO: 5.35 MILL/MM3 (ref 4.7–6.1)
RH FACTOR: NORMAL
SODIUM SERPL-SCNC: 137 MEQ/L (ref 135–145)
TRIGL SERPL-MCNC: 67 MG/DL (ref 0–199)
WBC # BLD AUTO: 6.6 THOU/MM3 (ref 4.8–10.8)

## 2024-03-08 PROCEDURE — 85730 THROMBOPLASTIN TIME PARTIAL: CPT

## 2024-03-08 PROCEDURE — 93458 L HRT ARTERY/VENTRICLE ANGIO: CPT | Performed by: INTERNAL MEDICINE

## 2024-03-08 PROCEDURE — 99152 MOD SED SAME PHYS/QHP 5/>YRS: CPT | Performed by: INTERNAL MEDICINE

## 2024-03-08 PROCEDURE — 6360000002 HC RX W HCPCS: Performed by: INTERNAL MEDICINE

## 2024-03-08 PROCEDURE — 86900 BLOOD TYPING SEROLOGIC ABO: CPT

## 2024-03-08 PROCEDURE — 86850 RBC ANTIBODY SCREEN: CPT

## 2024-03-08 PROCEDURE — 7100000010 HC PHASE II RECOVERY - FIRST 15 MIN: Performed by: INTERNAL MEDICINE

## 2024-03-08 PROCEDURE — 86901 BLOOD TYPING SEROLOGIC RH(D): CPT

## 2024-03-08 PROCEDURE — C1894 INTRO/SHEATH, NON-LASER: HCPCS | Performed by: INTERNAL MEDICINE

## 2024-03-08 PROCEDURE — 6360000004 HC RX CONTRAST MEDICATION: Performed by: INTERNAL MEDICINE

## 2024-03-08 PROCEDURE — 36415 COLL VENOUS BLD VENIPUNCTURE: CPT

## 2024-03-08 PROCEDURE — 99153 MOD SED SAME PHYS/QHP EA: CPT | Performed by: INTERNAL MEDICINE

## 2024-03-08 PROCEDURE — 85027 COMPLETE CBC AUTOMATED: CPT

## 2024-03-08 PROCEDURE — 80061 LIPID PANEL: CPT

## 2024-03-08 PROCEDURE — 2500000003 HC RX 250 WO HCPCS: Performed by: INTERNAL MEDICINE

## 2024-03-08 PROCEDURE — 7100000011 HC PHASE II RECOVERY - ADDTL 15 MIN: Performed by: INTERNAL MEDICINE

## 2024-03-08 PROCEDURE — C1769 GUIDE WIRE: HCPCS | Performed by: INTERNAL MEDICINE

## 2024-03-08 PROCEDURE — 85610 PROTHROMBIN TIME: CPT

## 2024-03-08 PROCEDURE — 80048 BASIC METABOLIC PNL TOTAL CA: CPT

## 2024-03-08 PROCEDURE — 2580000003 HC RX 258: Performed by: PHYSICIAN ASSISTANT

## 2024-03-08 PROCEDURE — 93005 ELECTROCARDIOGRAM TRACING: CPT | Performed by: PHYSICIAN ASSISTANT

## 2024-03-08 PROCEDURE — 93010 ELECTROCARDIOGRAM REPORT: CPT | Performed by: NUCLEAR MEDICINE

## 2024-03-08 PROCEDURE — 2709999900 HC NON-CHARGEABLE SUPPLY: Performed by: INTERNAL MEDICINE

## 2024-03-08 RX ORDER — SODIUM CHLORIDE 9 MG/ML
INJECTION, SOLUTION INTRAVENOUS PRN
Status: DISCONTINUED | OUTPATIENT
Start: 2024-03-08 | End: 2024-03-08 | Stop reason: HOSPADM

## 2024-03-08 RX ORDER — SODIUM CHLORIDE 0.9 % (FLUSH) 0.9 %
5-40 SYRINGE (ML) INJECTION PRN
Status: DISCONTINUED | OUTPATIENT
Start: 2024-03-08 | End: 2024-03-08 | Stop reason: HOSPADM

## 2024-03-08 RX ORDER — MIDAZOLAM HYDROCHLORIDE 1 MG/ML
INJECTION INTRAMUSCULAR; INTRAVENOUS PRN
Status: DISCONTINUED | OUTPATIENT
Start: 2024-03-08 | End: 2024-03-08 | Stop reason: HOSPADM

## 2024-03-08 RX ORDER — SODIUM CHLORIDE 0.9 % (FLUSH) 0.9 %
5-40 SYRINGE (ML) INJECTION EVERY 12 HOURS SCHEDULED
Status: DISCONTINUED | OUTPATIENT
Start: 2024-03-08 | End: 2024-03-08 | Stop reason: HOSPADM

## 2024-03-08 RX ORDER — ASPIRIN 325 MG
325 TABLET ORAL ONCE
Status: DISCONTINUED | OUTPATIENT
Start: 2024-03-08 | End: 2024-03-08 | Stop reason: HOSPADM

## 2024-03-08 RX ORDER — SODIUM CHLORIDE 9 MG/ML
INJECTION, SOLUTION INTRAVENOUS CONTINUOUS
Status: DISCONTINUED | OUTPATIENT
Start: 2024-03-08 | End: 2024-03-08 | Stop reason: HOSPADM

## 2024-03-08 RX ORDER — NITROGLYCERIN 0.4 MG/1
0.4 TABLET SUBLINGUAL EVERY 5 MIN PRN
Status: DISCONTINUED | OUTPATIENT
Start: 2024-03-08 | End: 2024-03-08 | Stop reason: HOSPADM

## 2024-03-08 RX ORDER — FENTANYL CITRATE 50 UG/ML
INJECTION, SOLUTION INTRAMUSCULAR; INTRAVENOUS PRN
Status: DISCONTINUED | OUTPATIENT
Start: 2024-03-08 | End: 2024-03-08 | Stop reason: HOSPADM

## 2024-03-08 RX ADMIN — SODIUM CHLORIDE: 9 INJECTION, SOLUTION INTRAVENOUS at 12:22

## 2024-03-08 ASSESSMENT — PAIN DESCRIPTION - LOCATION: LOCATION: CHEST

## 2024-03-08 ASSESSMENT — PAIN SCALES - GENERAL
PAINLEVEL_OUTOF10: 1
PAINLEVEL_OUTOF10: 1

## 2024-03-08 ASSESSMENT — PAIN DESCRIPTION - DESCRIPTORS: DESCRIPTORS: DULL

## 2024-03-08 ASSESSMENT — PAIN DESCRIPTION - FREQUENCY: FREQUENCY: CONTINUOUS

## 2024-03-08 ASSESSMENT — PAIN - FUNCTIONAL ASSESSMENT: PAIN_FUNCTIONAL_ASSESSMENT: ACTIVITIES ARE NOT PREVENTED

## 2024-03-08 ASSESSMENT — PAIN DESCRIPTION - PAIN TYPE: TYPE: CHRONIC PAIN

## 2024-03-08 NOTE — PLAN OF CARE
Problem: Pain  Goal: Verbalizes/displays adequate comfort level or baseline comfort level  Outcome: Completed

## 2024-03-08 NOTE — DISCHARGE INSTRUCTIONS
Always wash hands before touching incision area.     For Groin approach:  May shower in 24 hours.  Remove  dressing in 24 hours, gently clean site with soap and water, pat dry, and apply bandaid daily for 5 days.  Keep site clean and dry.  Do not apply any creams, lotions, or powders to puncture site.    Do not submerse site in water (no tub baths, swimming, or whirlpool) for 5 days.    Take it easy for 3 days. No driving for 3 days.    Avoid heaving lifting, pushing, pulling or straining.    Monitor site for bleeding, drainage, or swelling.    Monitor for signs of infection which include:  redness, drainage, soreness, or temp greater than 101 F.    Notify doctor of any abnormal findings as listed.   Watch for bleeding, firm lump at site or visible bleeding.    If bleeding occurs, hold firm pressure at site and call 911.            Coronary Angiogram: What to Expect at Home  Your Recovery     A coronary angiogram is a test to examine the large blood vessel of your heart (coronary artery). The doctor inserted a thin, flexible tube (catheter) into a blood vessel in your groin. In some cases, the catheter is placed in a blood vessel in the arm.  Your groin or arm may have a bruise and feel sore for a day or two after a coronary angiogram. You can do light activities around the house but nothing strenuous for several days.  This care sheet gives you a general idea about how long it will take for you to recover. But each person recovers at a different pace. Follow the steps below to feel better as quickly as possible.  How can you care for yourself at home?  Activity  Do not do strenuous exercise and do not lift, pull, or push anything heavy until your doctor says it is okay. This may be for a day or two. You can walk around the house and do light activity, such as cooking.  You may shower 24  hours after the procedure, if your doctor okays it. Pat the incision dry. Do not take a bath for 1 week, or until your doctor

## 2024-03-08 NOTE — H&P
[facility]  Sedation/Analgesia History & Physical    Pt Name: Joel Nolan  MRN: [id number]  YOB: 1965  Provider Performing Procedure: Rosemary Mejia MD  Primary Care Physician: Kun Welsh, DO    PRE-PROCEDURE   DNR-CCA/DNR-CC []Yes [x]No  Brief History/Pre-Procedure Diagnosis:     Chest pain on exertion Ifor 2 to 3 months  Abn nuc stress  CCS class III    The risk and benefit of left heart cath has been explain in detail including but not limited to  Bleeding including retroperitoneal bleed 1%, infection, MI, CVA, MARYAM, Limb loss, dissection, allergic reaction,death  Each of them 1 in 2000 range.  The alternative managment has been explained. Patient expressed understanding of the risk and benefit and of the alternative managment well.  Patient wanted and agreed to proceed with left heart cath.  Hence we will schedule him for [facility]             MEDICAL HISTORY  []CAD/Valve  []Liver Disease  []Lung Disease []Diabetes  []Hypertension []Renal Disease  []Additional information:       has a past medical history of ASHD (arteriosclerotic heart disease), Dyslipidemia, Former smoker, GERD (gastroesophageal reflux disease), Heart attack (HCC), Heart failure with preserved ejection fraction (HCC), Hypertension, essential, Mitral regurgitation, Nicotine dependence, chewing tobacco, uncomplicated, Obesity (BMI 30-39.9), S/P coronary artery stent placement, S/P mitral valve repair, and Torn meniscus.    SURGICAL HISTORY   has a past surgical history that includes Appendectomy; Coronary angioplasty; Mitral valve repair; Cardiac catheterization; Inguinal hernia repair (Left, 05/13/2016); Rotator cuff repair (Right); knee surgery (Left, 02/2021); Colonoscopy (2017); ventral hernia repair (05/13/2016); and hernia repair (Right, 2/4/2022).  Additional information:       ALLERGIES   Allergies as of 02/13/2024    (No Known Allergies)     Additional information:       MEDICATIONS   Coumadin Use Last

## 2024-03-08 NOTE — PROGRESS NOTES
1115 Patient admitted to 2E09  Ambulatory for heart cath.  Patient NPO. Patient accompanied by dad and son.  Vital signs obtained.   Assessment and data collection intiated.   Oriented to room.  Policies and procedures for 2E explained.   All questions answered with no further questions at this time.   Fall prevention and safety precautions discussed with patient.       1116 Care plan reviewed with patient and family.  Patient and family verbalize understanding of the plan of care and contribute to goal setting.       1337 To cath lab per bed, stable condition.   1445 cath lab holding pressure to rt groin in room.   1500 Care taken over from cath lab, right groin stable . Patient reinstructed on bedrest, instructed to keep legs straight, not to cross legs, not to lift head off of pillow, not to laugh hard, if coughs to guard site with hand, voices understanding, taking water without difficulty.          1730 Discharge instructions given, voices understanding.     1855 Up in galvez, activity tolerated well.     1954 Discharged per wheelchair, stable condition.

## 2024-03-08 NOTE — FLOWSHEET NOTE
03/08/24 1747   AVS Reviewed   AVS & discharge instructions reviewed with patient and/or representative? Yes   Reviewed instructions with Patient;Other (name and relationship in comment)  (Xavier , son)   Level of Understanding Questions answered;Verbalized understanding

## 2024-03-11 ENCOUNTER — TELEPHONE (OUTPATIENT)
Dept: CARDIOLOGY CLINIC | Age: 59
End: 2024-03-11

## 2024-03-11 NOTE — TELEPHONE ENCOUNTER
Pt had cath 3-8-24.  No stents were placed.  Can pt return to work?    ----- Message -----       From:Joel Nolan       Sent:3/11/2024  9:28 AM EDT         To:Dr. Rosemary Mejia    Subject:Work    Good morning. I had a heart cath Friday and no one told me when I can go back to work. The paper said no lifting over 10 lbs for 7 days. I can’t not have any restrictions to work or the won’t let me. I do a lot of physical work. Also I need a return to work slip. Thank you!

## 2024-04-10 RX ORDER — ATORVASTATIN CALCIUM 20 MG/1
TABLET, FILM COATED ORAL
Qty: 90 TABLET | Refills: 3 | OUTPATIENT
Start: 2024-04-10

## 2024-04-11 RX ORDER — AMLODIPINE BESYLATE 10 MG/1
10 TABLET ORAL DAILY
Qty: 90 TABLET | Refills: 1 | Status: SHIPPED | OUTPATIENT
Start: 2024-04-11

## 2024-04-11 RX ORDER — LISINOPRIL 20 MG/1
TABLET ORAL
Qty: 90 TABLET | Refills: 1 | Status: SHIPPED | OUTPATIENT
Start: 2024-04-11

## 2024-04-11 RX ORDER — ATORVASTATIN CALCIUM 20 MG/1
TABLET, FILM COATED ORAL
Qty: 90 TABLET | Refills: 1 | Status: SHIPPED | OUTPATIENT
Start: 2024-04-11

## 2024-07-25 ENCOUNTER — OFFICE VISIT (OUTPATIENT)
Dept: CARDIOLOGY CLINIC | Age: 59
End: 2024-07-25
Payer: COMMERCIAL

## 2024-07-25 VITALS
HEIGHT: 69 IN | BODY MASS INDEX: 30.53 KG/M2 | WEIGHT: 206.13 LBS | DIASTOLIC BLOOD PRESSURE: 70 MMHG | HEART RATE: 66 BPM | SYSTOLIC BLOOD PRESSURE: 113 MMHG

## 2024-07-25 DIAGNOSIS — Z98.890 S/P CARDIAC CATH: ICD-10-CM

## 2024-07-25 DIAGNOSIS — Z98.890 S/P CARDIAC CATHETERIZATION: ICD-10-CM

## 2024-07-25 DIAGNOSIS — E78.5 DYSLIPIDEMIA: Chronic | ICD-10-CM

## 2024-07-25 DIAGNOSIS — I25.10 CORONARY ARTERY DISEASE INVOLVING NATIVE CORONARY ARTERY OF NATIVE HEART WITHOUT ANGINA PECTORIS: Primary | ICD-10-CM

## 2024-07-25 DIAGNOSIS — Z82.49 FAMILY HISTORY OF EARLY CAD: Chronic | ICD-10-CM

## 2024-07-25 DIAGNOSIS — I10 HYPERTENSION, ESSENTIAL: Chronic | ICD-10-CM

## 2024-07-25 PROBLEM — R07.9 CHEST PAIN AT REST: Status: RESOLVED | Noted: 2024-02-12 | Resolved: 2024-07-25

## 2024-07-25 PROBLEM — R94.39 ABNORMAL STRESS TEST: Status: RESOLVED | Noted: 2024-02-12 | Resolved: 2024-07-25

## 2024-07-25 PROBLEM — Z87.898 HISTORY OF CHEST PAIN: Status: RESOLVED | Noted: 2024-01-25 | Resolved: 2024-07-25

## 2024-07-25 PROBLEM — U07.1 COVID: Status: RESOLVED | Noted: 2022-01-04 | Resolved: 2024-07-25

## 2024-07-25 PROBLEM — R06.09 DOE (DYSPNEA ON EXERTION): Status: RESOLVED | Noted: 2024-01-25 | Resolved: 2024-07-25

## 2024-07-25 PROCEDURE — 99214 OFFICE O/P EST MOD 30 MIN: CPT | Performed by: INTERNAL MEDICINE

## 2024-07-25 PROCEDURE — 3078F DIAST BP <80 MM HG: CPT | Performed by: INTERNAL MEDICINE

## 2024-07-25 PROCEDURE — 3074F SYST BP LT 130 MM HG: CPT | Performed by: INTERNAL MEDICINE

## 2024-07-25 RX ORDER — AMLODIPINE BESYLATE 10 MG/1
10 TABLET ORAL DAILY
Qty: 90 TABLET | Refills: 3 | Status: SHIPPED | OUTPATIENT
Start: 2024-07-25

## 2024-07-25 RX ORDER — ATORVASTATIN CALCIUM 20 MG/1
TABLET, FILM COATED ORAL
Qty: 90 TABLET | Refills: 3 | Status: SHIPPED | OUTPATIENT
Start: 2024-07-25

## 2024-07-25 RX ORDER — LISINOPRIL 20 MG/1
20 TABLET ORAL DAILY
Qty: 90 TABLET | Refills: 3 | Status: SHIPPED | OUTPATIENT
Start: 2024-07-25

## 2024-07-25 NOTE — PROGRESS NOTES
originally  patient moved from [place]  ???Hx of Mitral valve repair/ clip for MR 5 yrs back- transcathater- per pat ( not confirmed)  ???Hx of coronary stent 2 5 yrs back per pat ( Not confirmed from record)  Record from outside  Reviewed         Pt here for 6 mo check up  and post cath     Last EKG done 3/8/24    Sob on exertion better    Denied chest pain,    palpitation, dizziness or edema    Hx of chest pain dec 2023 after he stopped his cardiac meds for 4 days -    Nonsmoker    FHX  Mother had valve procedure, had stent at 65    Patient Active Problem List   Diagnosis    Family history of early CAD    Non-recurrent unilateral inguinal hernia without obstruction or gangrene    LVH (left ventricular hypertrophy)    Dyslipidemia    Former smoker    Heart failure with preserved ejection fraction (HCC)    Hypertension, essential    Nicotine dependence, chewing tobacco, uncomplicated    Obesity (BMI 30-39.9)    Dysfunction of both eustachian tubes    Right lateral epicondylitis    Left anterior cruciate ligament tear    Tear of lateral meniscus of left knee, current    S/P cardiac cath 2015 mod LAD lesion 30 to 50%- med RX    Coronary artery disease involving native coronary artery of native heart    S/P cardiac catheterization 3/8/24- NONOBSTRUCTIVE, WITH PATENT LAD STENT, EDP 14- MED RX       Past Surgical History:   Procedure Laterality Date    APPENDECTOMY      CARDIAC CATHETERIZATION      CARDIAC PROCEDURE N/A 3/8/2024    Left heart cath / coronary angiography performed by Rosemary Mejia MD at [facility] CARDIAC CATH LAB    COLONOSCOPY  2017    [place]-- medical records could not send anything over 8 years old    CORONARY ANGIOPLASTY      Dr. Patiño    HERNIA REPAIR Right 2/4/2022    ROBOTIC RIGHT INGUINAL HERNIA REPAIR WITH MESH performed by John Cannon MD at [facility] OR    INGUINAL HERNIA REPAIR Left 05/13/2016    [place], [place]    KNEE SURGERY Left 02/2021    MITRAL VALVE REPAIR      ROTATOR CUFF

## 2025-02-17 ENCOUNTER — OFFICE VISIT (OUTPATIENT)
Dept: CARDIOLOGY CLINIC | Age: 60
End: 2025-02-17
Payer: COMMERCIAL

## 2025-02-17 VITALS
DIASTOLIC BLOOD PRESSURE: 99 MMHG | SYSTOLIC BLOOD PRESSURE: 156 MMHG | HEIGHT: 69 IN | WEIGHT: 210 LBS | HEART RATE: 67 BPM | BODY MASS INDEX: 31.1 KG/M2

## 2025-02-17 DIAGNOSIS — E78.5 DYSLIPIDEMIA: Chronic | ICD-10-CM

## 2025-02-17 DIAGNOSIS — I51.7 LVH (LEFT VENTRICULAR HYPERTROPHY): ICD-10-CM

## 2025-02-17 DIAGNOSIS — I10 HYPERTENSION, ESSENTIAL: Chronic | ICD-10-CM

## 2025-02-17 DIAGNOSIS — Z98.890 S/P CARDIAC CATHETERIZATION: ICD-10-CM

## 2025-02-17 DIAGNOSIS — Z98.890 S/P CARDIAC CATH: ICD-10-CM

## 2025-02-17 DIAGNOSIS — I25.10 CORONARY ARTERY DISEASE INVOLVING NATIVE CORONARY ARTERY OF NATIVE HEART WITHOUT ANGINA PECTORIS: Primary | ICD-10-CM

## 2025-02-17 DIAGNOSIS — Z91.148 NONCOMPLIANCE WITH MEDICATION REGIMEN: ICD-10-CM

## 2025-02-17 PROCEDURE — 3080F DIAST BP >= 90 MM HG: CPT | Performed by: INTERNAL MEDICINE

## 2025-02-17 PROCEDURE — 99214 OFFICE O/P EST MOD 30 MIN: CPT | Performed by: INTERNAL MEDICINE

## 2025-02-17 PROCEDURE — 3077F SYST BP >= 140 MM HG: CPT | Performed by: INTERNAL MEDICINE

## 2025-02-17 RX ORDER — NEBIVOLOL HYDROCHLORIDE 2.5 MG/1
TABLET ORAL
Qty: 90 TABLET | Refills: 3 | Status: SHIPPED | OUTPATIENT
Start: 2025-02-17

## 2025-02-17 NOTE — PROGRESS NOTES
palpitations, lightheadedness, dizziness, orthopnea, PND or pedal edema.   All medication side effects were discussed in details.        RTC in 6 months    Rosemary Mejia MD,MD,FACC

## 2025-04-29 RX ORDER — LISINOPRIL 20 MG/1
TABLET ORAL
Qty: 90 TABLET | Refills: 0 | Status: SHIPPED | OUTPATIENT
Start: 2025-04-29

## 2025-04-29 RX ORDER — AMLODIPINE BESYLATE 10 MG/1
TABLET ORAL
Qty: 90 TABLET | Refills: 0 | Status: SHIPPED | OUTPATIENT
Start: 2025-04-29

## 2025-04-29 RX ORDER — ATORVASTATIN CALCIUM 20 MG/1
TABLET, FILM COATED ORAL
Qty: 90 TABLET | Refills: 0 | Status: SHIPPED | OUTPATIENT
Start: 2025-04-29

## 2025-05-05 RX ORDER — NEBIVOLOL 2.5 MG/1
2.5 TABLET ORAL DAILY
Qty: 90 TABLET | Refills: 3 | Status: SHIPPED | OUTPATIENT
Start: 2025-05-05

## 2025-05-05 NOTE — TELEPHONE ENCOUNTER
Will need to submit PA on CoverMyMeds    Joel Nolan to [facility] Clinical Staff (supporting Rosemary Mejia MD) (Selected Message)  DM      25  2:39 PM  Good afternoon. [facility] prescription provider[facility], is asking for a pre authorization for my Bystolic prescription. They said it only lasts a year and the last one is . I’m not sure if I need to call in to the doctor or if we can take care of it here?   Thank you

## 2025-05-05 NOTE — TELEPHONE ENCOUNTER
Attempted PA  Looks like nebivolol 2.5 mg is covered under pt's plan  Previous script went as Bystolic 2.5 ROQUE  Spoke to patient, discussed. No reason he needed the brand name.     This is what CoverMyMeds says for nebivolol:  This medication or product is on your plan's list of covered drugs. Prior authorization is not required at this time. If your pharmacy has questions regarding the processing of your prescription, please have them call the [employer] pharmacy help desk at [phone].     New Rx pended.

## 2025-05-29 ENCOUNTER — HOSPITAL ENCOUNTER (OUTPATIENT)
Dept: AUDIOLOGY | Age: 60
Discharge: HOME | End: 2025-05-29
Payer: COMMERCIAL

## 2025-05-29 PROCEDURE — 92557 COMPREHENSIVE HEARING TEST: CPT | Performed by: AUDIOLOGIST

## 2025-05-29 NOTE — PROGRESS NOTES
AUDIOLOGICAL EVALUATION      REASON FOR TESTING:  Audiometric evaluation per patient request due to a history of hearing loss with hearing aid use. The patient reports his previous hearing aids were lost approximately 3 years ago. He was recently evaluated at [facility] in [place]. The patient is interested in obtaining new hearing aids. He denies any otalgia, otorrhea, aural fullness or vertigo. 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 The patient reports a significant family history of acquired/noise-induced hearing loss in several male family members.    OTOSCOPY: Clear external ear canals, tympanic membranes visible/WNL, bilaterally.     AUDIOGRAM        Reliability: Good    COMMENTS: Pure tone audiogram indicates borderline/slight low frequency sloping to moderately-severe high frequency sensorineural hearing loss, bilaterally. Speech reception thresholds agree with pure tone averages, bilaterally. Word recognition scores are excellent, bilaterally, with speech presented at 65dB in quiet.        RECOMMENDATION(S):   Consider binaural amplification pending medical clearance.  Repeat audiologic testing in 12 months to rule out progression, sooner with noticeable changes or concerns.  Hearing conservation in noise.

## 2025-06-02 ENCOUNTER — TELEPHONE (OUTPATIENT)
Dept: AUDIOLOGY | Age: 60
End: 2025-06-02

## 2025-06-02 NOTE — TELEPHONE ENCOUNTER
Called and spoke to patient re: insurance verification for hearing aids. Eligible for hearing aids through [employer] active [employer] employee. No 3rd party listed. Medical clearance w/ENT required for first set of hearing aids. Previous set of hearing aids were self pay, not billed to [employer]. Patient agreeable to scheduling ENT consult. May order hearing aids and schedule fitting once medical clearance received.

## 2025-06-19 ENCOUNTER — OFFICE VISIT (OUTPATIENT)
Dept: ENT CLINIC | Age: 60
End: 2025-06-19
Payer: COMMERCIAL

## 2025-06-19 VITALS
HEART RATE: 61 BPM | TEMPERATURE: 97 F | BODY MASS INDEX: 29.95 KG/M2 | DIASTOLIC BLOOD PRESSURE: 64 MMHG | WEIGHT: 202.2 LBS | SYSTOLIC BLOOD PRESSURE: 118 MMHG | HEIGHT: 69 IN | RESPIRATION RATE: 18 BRPM | OXYGEN SATURATION: 99 %

## 2025-06-19 DIAGNOSIS — H90.3 SENSORINEURAL HEARING LOSS (SNHL) OF BOTH EARS: Primary | ICD-10-CM

## 2025-06-19 PROCEDURE — 3074F SYST BP LT 130 MM HG: CPT | Performed by: OTOLARYNGOLOGY

## 2025-06-19 PROCEDURE — 92591 HEARING AID EXAMINATION & SELECTION BINAURAL: CPT | Performed by: OTOLARYNGOLOGY

## 2025-06-19 PROCEDURE — 99202 OFFICE O/P NEW SF 15 MIN: CPT | Performed by: OTOLARYNGOLOGY

## 2025-06-19 PROCEDURE — 3078F DIAST BP <80 MM HG: CPT | Performed by: OTOLARYNGOLOGY

## 2025-06-19 NOTE — PROGRESS NOTES
[facility] [place] [facility]  [facility] EAR, NOSE AND THROAT  [address]   [address]  [place] [address]  Dept: [phone]  Dept Fax: [phone]  Loc: [phone]    Joel Nolan is a 59 y.o. male who was referred byNo ref. provider found for:  Chief Complaint   Patient presents with    New Patient     New patient here for medical clearance for hearing aids. Patient stated that he is doing well.   .    HPI:     Joel Nolan is a 59 y.o. male who presents today for hearing loss.The patient reports his previous hearing aids were lost approximately 3 years ago. He was recently evaluated at [facility] in [place]. The patient is interested in obtaining new hearing aids. He denies any otalgia, otorrhea, aural fullness or vertigo. 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 The patient reports a significant family history of acquired/noise-induced hearing loss in several male family members. Pure tone audiogram indicates borderline/slight low frequency sloping to moderately-severe high frequency sensorineural hearing loss, bilaterally. Speech reception thresholds agree with pure tone averages, bilaterally. Word recognition scores are excellent, bilaterally, with speech presented at 65dB in quiet.      History:     No Known Allergies  Current Outpatient Medications   Medication Sig Dispense Refill    nebivolol (BYSTOLIC) 2.5 MG tablet Take 1 tablet by mouth daily 90 tablet 3    atorvastatin (LIPITOR) 20 MG tablet Take 1 tablet daily 90 tablet 0    lisinopril (PRINIVIL;ZESTRIL) 20 MG tablet Take 1 tablet daily 90 tablet 0    amLODIPine (NORVASC) 10 MG tablet Take 1 tablet daily 90 tablet 0    fluticasone (FLONASE) 50 MCG/ACT nasal spray USE 1 SPRAY IN BOTH  NOSTRILS ONCE DAILY 3 each 0    Multiple Vitamins-Minerals (THERAPEUTIC MULTIVITAMIN-MINERALS) tablet Take 1 tablet by mouth daily      aspirin EC 81 MG EC tablet Take 1 tablet by mouth daily 90

## 2025-06-24 RX ORDER — LISINOPRIL 20 MG/1
20 TABLET ORAL DAILY
Qty: 90 TABLET | Refills: 0 | Status: SHIPPED | OUTPATIENT
Start: 2025-06-24

## 2025-06-24 RX ORDER — AMLODIPINE BESYLATE 10 MG/1
10 TABLET ORAL DAILY
Qty: 90 TABLET | Refills: 0 | Status: SHIPPED | OUTPATIENT
Start: 2025-06-24

## 2025-06-24 RX ORDER — ATORVASTATIN CALCIUM 20 MG/1
20 TABLET, FILM COATED ORAL DAILY
Qty: 90 TABLET | Refills: 0 | Status: SHIPPED | OUTPATIENT
Start: 2025-06-24

## 2025-07-10 ENCOUNTER — HOSPITAL ENCOUNTER (OUTPATIENT)
Dept: AUDIOLOGY | Age: 60
Discharge: HOME | End: 2025-07-10
Payer: COMMERCIAL

## 2025-07-10 PROCEDURE — V5020 CONFORMITY EVALUATION: HCPCS | Performed by: AUDIOLOGIST

## 2025-07-10 PROCEDURE — V5160 DISPENSING FEE BINAURAL: HCPCS | Performed by: AUDIOLOGIST

## 2025-07-10 PROCEDURE — V5261 HEARING AID, DIGIT, BIN, BTE: HCPCS | Performed by: AUDIOLOGIST

## 2025-07-10 NOTE — PROGRESS NOTES
[facility]#: [id number]   [facility]#: [id number]  PAYOR: ANDRES  PRIOR AUTH #: N/A  Number of visits allowed: No charge visits for the first year (until 7/10/26).  Charge for follow up visits: Follow up visits after the first year are subject to current office visit charge.      DIAGNOSIS: H90.3    NEW HEARING AID FITTING: Phonak Audeo Infinio 30-R x2. Size 2M receivers, small vented domes, no retention locks. Previous hearing aid user. Programs are automatic and P1: comfort in noise. Patient reported steady-state AC noise in office as distracting. Left office in automatic program with VC at -3. Explained care, use and insertion/removal.  Gave 1 pack cerustops and 1 pack domes but did not demo, plan to review at f/u. Gave extra hard and soft cases, patient likely to remove aids while riding motorcycle, advised to keep hearing aids in safe case or  whenever not in use. Paired aids to bluetooth on iphone and Squabbler melissa. Patient did well with all. Hearing aid fitting recheck scheduled for two weeks.      SPEECH MAPPING:    The [employer] real ear system was used to perform verification of Joel's hearing aid fitting. The output of Joel's binaural BOBBY amplification was programmed to match appropriate NAL-NL2 targets for MPO, soft, medium and loud stimuli utilizing speech mapping based on his 5/29/25 audiogram.    Speech mapping results suggest: Improved speech intelligibility index with amplification.

## 2025-08-26 ENCOUNTER — OFFICE VISIT (OUTPATIENT)
Dept: FAMILY MEDICINE CLINIC | Age: 60
End: 2025-08-26
Payer: COMMERCIAL

## 2025-08-26 VITALS
TEMPERATURE: 97.8 F | DIASTOLIC BLOOD PRESSURE: 86 MMHG | RESPIRATION RATE: 16 BRPM | SYSTOLIC BLOOD PRESSURE: 130 MMHG | WEIGHT: 208.2 LBS | BODY MASS INDEX: 30.84 KG/M2 | HEIGHT: 69 IN | HEART RATE: 76 BPM | OXYGEN SATURATION: 98 %

## 2025-08-26 DIAGNOSIS — I50.32 CHRONIC HEART FAILURE WITH PRESERVED EJECTION FRACTION (HCC): Chronic | ICD-10-CM

## 2025-08-26 DIAGNOSIS — Z12.11 SCREENING FOR COLON CANCER: ICD-10-CM

## 2025-08-26 DIAGNOSIS — I10 HYPERTENSION, ESSENTIAL: Chronic | ICD-10-CM

## 2025-08-26 DIAGNOSIS — I25.10 ASHD (ARTERIOSCLEROTIC HEART DISEASE): Chronic | ICD-10-CM

## 2025-08-26 DIAGNOSIS — F17.220 NICOTINE DEPENDENCE, CHEWING TOBACCO, UNCOMPLICATED: Chronic | ICD-10-CM

## 2025-08-26 DIAGNOSIS — S83.512A RUPTURE OF ANTERIOR CRUCIATE LIGAMENT OF LEFT KNEE, INITIAL ENCOUNTER: ICD-10-CM

## 2025-08-26 DIAGNOSIS — Z12.5 SPECIAL SCREENING FOR MALIGNANT NEOPLASM OF PROSTATE: ICD-10-CM

## 2025-08-26 DIAGNOSIS — Z01.818 PREOP EXAMINATION: Primary | ICD-10-CM

## 2025-08-26 DIAGNOSIS — E78.5 DYSLIPIDEMIA: Chronic | ICD-10-CM

## 2025-08-26 PROBLEM — M77.11 RIGHT LATERAL EPICONDYLITIS: Status: RESOLVED | Noted: 2020-07-09 | Resolved: 2025-08-26

## 2025-08-26 PROBLEM — Z91.148 NONCOMPLIANCE WITH MEDICATION REGIMEN: Status: RESOLVED | Noted: 2025-02-17 | Resolved: 2025-08-26

## 2025-08-26 PROBLEM — S83.282A TEAR OF LATERAL MENISCUS OF LEFT KNEE, CURRENT: Status: RESOLVED | Noted: 2020-11-09 | Resolved: 2025-08-26

## 2025-08-26 PROBLEM — H69.93 DYSFUNCTION OF BOTH EUSTACHIAN TUBES: Status: RESOLVED | Noted: 2020-07-09 | Resolved: 2025-08-26

## 2025-08-26 PROCEDURE — 3075F SYST BP GE 130 - 139MM HG: CPT | Performed by: FAMILY MEDICINE

## 2025-08-26 PROCEDURE — 93000 ELECTROCARDIOGRAM COMPLETE: CPT | Performed by: FAMILY MEDICINE

## 2025-08-26 PROCEDURE — 3079F DIAST BP 80-89 MM HG: CPT | Performed by: FAMILY MEDICINE

## 2025-08-26 PROCEDURE — 99204 OFFICE O/P NEW MOD 45 MIN: CPT | Performed by: FAMILY MEDICINE

## 2025-08-26 RX ORDER — VARENICLINE TARTRATE 1 MG/1
1 TABLET, FILM COATED ORAL 2 TIMES DAILY
Qty: 60 TABLET | Refills: 1 | Status: SHIPPED | OUTPATIENT
Start: 2025-09-23 | End: 2025-11-22

## 2025-08-26 RX ORDER — VARENICLINE TARTRATE 0.5 MG/1
.5-1 TABLET, FILM COATED ORAL SEE ADMIN INSTRUCTIONS
Qty: 57 TABLET | Refills: 0 | Status: SHIPPED | OUTPATIENT
Start: 2025-08-26

## 2025-08-26 SDOH — ECONOMIC STABILITY: FOOD INSECURITY: WITHIN THE PAST 12 MONTHS, YOU WORRIED THAT YOUR FOOD WOULD RUN OUT BEFORE YOU GOT MONEY TO BUY MORE.: NEVER TRUE

## 2025-08-26 SDOH — ECONOMIC STABILITY: FOOD INSECURITY: WITHIN THE PAST 12 MONTHS, THE FOOD YOU BOUGHT JUST DIDN'T LAST AND YOU DIDN'T HAVE MONEY TO GET MORE.: NEVER TRUE

## 2025-08-26 ASSESSMENT — PATIENT HEALTH QUESTIONNAIRE - PHQ9
SUM OF ALL RESPONSES TO PHQ QUESTIONS 1-9: 0
2. FEELING DOWN, DEPRESSED OR HOPELESS: NOT AT ALL
1. LITTLE INTEREST OR PLEASURE IN DOING THINGS: NOT AT ALL
SUM OF ALL RESPONSES TO PHQ QUESTIONS 1-9: 0

## 2025-08-29 ENCOUNTER — TELEPHONE (OUTPATIENT)
Dept: FAMILY MEDICINE CLINIC | Age: 60
End: 2025-08-29

## 2025-08-29 ENCOUNTER — LAB (OUTPATIENT)
Dept: LAB | Age: 60
End: 2025-08-29

## 2025-08-29 DIAGNOSIS — I25.10 ASHD (ARTERIOSCLEROTIC HEART DISEASE): Chronic | ICD-10-CM

## 2025-08-29 DIAGNOSIS — Z12.5 SPECIAL SCREENING FOR MALIGNANT NEOPLASM OF PROSTATE: ICD-10-CM

## 2025-08-29 DIAGNOSIS — I50.32 CHRONIC HEART FAILURE WITH PRESERVED EJECTION FRACTION (HCC): Chronic | ICD-10-CM

## 2025-08-29 DIAGNOSIS — I10 HYPERTENSION, ESSENTIAL: Chronic | ICD-10-CM

## 2025-08-29 DIAGNOSIS — E78.5 DYSLIPIDEMIA: Chronic | ICD-10-CM

## 2025-08-29 LAB
ALBUMIN SERPL BCG-MCNC: 4.1 G/DL (ref 3.4–4.9)
ALP SERPL-CCNC: 63 U/L (ref 40–129)
ALT SERPL W/O P-5'-P-CCNC: 27 U/L (ref 10–50)
ANION GAP SERPL CALC-SCNC: 8 MEQ/L (ref 8–16)
AST SERPL-CCNC: 26 U/L (ref 10–50)
BASOPHILS ABSOLUTE: 0.1 THOU/MM3 (ref 0–0.1)
BASOPHILS NFR BLD AUTO: 1 %
BILIRUB SERPL-MCNC: 0.7 MG/DL (ref 0.3–1.2)
BUN SERPL-MCNC: 18 MG/DL (ref 8–23)
CALCIUM SERPL-MCNC: 9.5 MG/DL (ref 8.5–10.5)
CHLORIDE SERPL-SCNC: 104 MEQ/L (ref 98–111)
CHOLEST SERPL-MCNC: 172 MG/DL (ref 100–199)
CO2 SERPL-SCNC: 26 MEQ/L (ref 22–29)
CREAT SERPL-MCNC: 1.1 MG/DL (ref 0.7–1.2)
CREAT UR-MCNC: 189 MG/DL
DEPRECATED RDW RBC AUTO: 42 FL (ref 35–45)
EOSINOPHIL NFR BLD AUTO: 6.4 %
EOSINOPHILS ABSOLUTE: 0.4 THOU/MM3 (ref 0–0.4)
ERYTHROCYTE [DISTWIDTH] IN BLOOD BY AUTOMATED COUNT: 12.4 % (ref 11.5–14.5)
GFR SERPL CREATININE-BSD FRML MDRD: 77 ML/MIN/1.73M2
GLUCOSE SERPL-MCNC: 92 MG/DL (ref 74–109)
HCT VFR BLD AUTO: 45.5 % (ref 42–52)
HDLC SERPL-MCNC: 51 MG/DL
HGB BLD-MCNC: 15.3 GM/DL (ref 14–18)
IMM GRANULOCYTES # BLD AUTO: 0.03 THOU/MM3 (ref 0–0.07)
IMM GRANULOCYTES NFR BLD AUTO: 0.5 %
LDLC SERPL CALC-MCNC: 109 MG/DL
LYMPHOCYTES ABSOLUTE: 2.2 THOU/MM3 (ref 1–4.8)
LYMPHOCYTES NFR BLD AUTO: 37.2 %
MCH RBC QN AUTO: 30.9 PG (ref 26–33)
MCHC RBC AUTO-ENTMCNC: 33.6 GM/DL (ref 32.2–35.5)
MCV RBC AUTO: 91.9 FL (ref 80–94)
MICROALBUMIN UR-MCNC: < 2 MG/DL
MICROALBUMIN/CREAT RATIO PNL UR: 11 MG/G (ref 0–30)
MONOCYTES ABSOLUTE: 0.6 THOU/MM3 (ref 0.4–1.3)
MONOCYTES NFR BLD AUTO: 10.4 %
NEUTROPHILS ABSOLUTE: 2.6 THOU/MM3 (ref 1.8–7.7)
NEUTROPHILS NFR BLD AUTO: 44.5 %
NRBC BLD AUTO-RTO: 0 /100 WBC
PLATELET # BLD AUTO: 209 THOU/MM3 (ref 130–400)
PMV BLD AUTO: 9.5 FL (ref 9.4–12.4)
POTASSIUM SERPL-SCNC: 4.6 MEQ/L (ref 3.5–5.2)
PROT SERPL-MCNC: 6.5 G/DL (ref 6.4–8.3)
PSA SERPL-MCNC: 0.62 NG/ML (ref 0–1)
RBC # BLD AUTO: 4.95 MILL/MM3 (ref 4.7–6.1)
SODIUM SERPL-SCNC: 138 MEQ/L (ref 135–145)
TRIGL SERPL-MCNC: 62 MG/DL (ref 0–199)
TSH SERPL DL<=0.05 MIU/L-ACNC: 3.56 UIU/ML (ref 0.27–4.2)
WBC # BLD AUTO: 5.8 THOU/MM3 (ref 4.8–10.8)

## 2025-09-02 ENCOUNTER — TELEPHONE (OUTPATIENT)
Dept: FAMILY MEDICINE CLINIC | Age: 60
End: 2025-09-02

## 2025-09-02 ENCOUNTER — TELEPHONE (OUTPATIENT)
Dept: CARDIOLOGY CLINIC | Age: 60
End: 2025-09-02

## (undated) DEVICE — CANNULA SEAL

## (undated) DEVICE — ARM DRAPE

## (undated) DEVICE — GENERAL LAPAROSCOPY PACK-LF: Brand: MEDLINE INDUSTRIES, INC.

## (undated) DEVICE — BANDAGE ADH W1XL3IN NAT FAB WVN FLX DURABLE N ADH PD SEAL

## (undated) DEVICE — SOLUTION ANTIFOG VIS SYS CLEARIFY LAPSCP

## (undated) DEVICE — CATHETER ANGIO 5FR L100CM GRY S STL NYL JR4 3 SEG BRAID L

## (undated) DEVICE — GUIDEWIRE VASC L150CM DIA0.035IN FLX END L7CM J 3MM PTFE

## (undated) DEVICE — GLOVE SURG SZ 7.5 L11.73IN FNGR THK9.8MIL STRW LTX POLYMER

## (undated) DEVICE — SUTURE V-LOC 180 SZ 2-0 L9IN ABSRB GRN L27MM GS-22 1/2 CIR VLOCL2145

## (undated) DEVICE — ELECTRO LUBE IS A SINGLE PATIENT USE DEVICE THAT IS INTENDED TO BE USED ON ELECTROSURGICAL ELECTRODES TO REDUCE STICKING.: Brand: KEY SURGICAL ELECTRO LUBE

## (undated) DEVICE — 35 ML SYRINGE LUER-LOCK TIP: Brand: MONOJECT

## (undated) DEVICE — TIP COVER ACCESSORY

## (undated) DEVICE — 4F (1.0MM ID) X 9CM STIFF4F (1.0 MICRO-STICK®INTRODUCER SE WITH NITINOL GUIDEWIREWITH NITIN WITH RADIOPAQUE TIPWITH RADIOPAQ: Brand: MICRO-STICK SETMICRO-STICK SET

## (undated) DEVICE — CATHETER ANGIO JL4 0.045 INX5 FRX100 CM THRULUMEN EXPO

## (undated) DEVICE — TUBING INSUFFLATOR HEAT HUMIDIFIED SMK EVAC SET PNEUMOCLEAR

## (undated) DEVICE — BAG RETRIEVAL SPECIMEN SUPERBAG 5 SMALL NYLON ITRODUCER

## (undated) DEVICE — COLUMN DRAPE

## (undated) DEVICE — PINNACLE INTRODUCER SHEATH: Brand: PINNACLE

## (undated) DEVICE — INSUFFLATION NEEDLE TO ESTABLISH PNEUMOPERITONEUM.: Brand: INSUFFLATION NEEDLE

## (undated) DEVICE — DRAPE KIT RAMAPR RADIATION SHIELD

## (undated) DEVICE — BLADELESS OBTURATOR: Brand: WECK VISTA

## (undated) DEVICE — CARDIAC CATH LAB PACK LF